# Patient Record
Sex: FEMALE | Race: WHITE | NOT HISPANIC OR LATINO | Employment: OTHER | URBAN - METROPOLITAN AREA
[De-identification: names, ages, dates, MRNs, and addresses within clinical notes are randomized per-mention and may not be internally consistent; named-entity substitution may affect disease eponyms.]

---

## 2024-10-26 ENCOUNTER — HOSPITAL ENCOUNTER (EMERGENCY)
Facility: HOSPITAL | Age: 49
Discharge: HOME | End: 2024-10-26
Attending: EMERGENCY MEDICINE
Payer: MEDICARE

## 2024-10-26 VITALS
DIASTOLIC BLOOD PRESSURE: 84 MMHG | HEART RATE: 96 BPM | WEIGHT: 170.42 LBS | SYSTOLIC BLOOD PRESSURE: 126 MMHG | OXYGEN SATURATION: 96 % | BODY MASS INDEX: 32.17 KG/M2 | HEIGHT: 61 IN | TEMPERATURE: 98.3 F | RESPIRATION RATE: 18 BRPM

## 2024-10-26 DIAGNOSIS — M54.10 RADICULOPATHY, UNSPECIFIED SPINAL REGION: ICD-10-CM

## 2024-10-26 DIAGNOSIS — M54.12 CERVICAL RADICULOPATHY: Primary | ICD-10-CM

## 2024-10-26 PROCEDURE — 63710000001 PREDNISONE PER 5 MG: Performed by: EMERGENCY MEDICINE

## 2024-10-26 PROCEDURE — 99283 EMERGENCY DEPT VISIT LOW MDM: CPT

## 2024-10-26 RX ORDER — HYDROCODONE BITARTRATE AND ACETAMINOPHEN 7.5; 325 MG/1; MG/1
1 TABLET ORAL ONCE
Status: COMPLETED | OUTPATIENT
Start: 2024-10-26 | End: 2024-10-26

## 2024-10-26 RX ORDER — PREDNISONE 50 MG/1
50 TABLET ORAL ONCE
Status: COMPLETED | OUTPATIENT
Start: 2024-10-26 | End: 2024-10-26

## 2024-10-26 RX ORDER — HYDROXYZINE HYDROCHLORIDE 50 MG/1
50 TABLET, FILM COATED ORAL 2 TIMES DAILY PRN
COMMUNITY
Start: 2024-10-11

## 2024-10-26 RX ORDER — BUSPIRONE HYDROCHLORIDE 10 MG/1
1 TABLET ORAL EVERY 12 HOURS SCHEDULED
COMMUNITY
Start: 2024-09-12

## 2024-10-26 RX ORDER — ALPRAZOLAM 2 MG
TABLET ORAL
COMMUNITY
Start: 2024-10-11

## 2024-10-26 RX ORDER — HYDROCODONE BITARTRATE AND ACETAMINOPHEN 7.5; 325 MG/1; MG/1
1 TABLET ORAL EVERY 6 HOURS
COMMUNITY
Start: 2024-10-08 | End: 2024-10-26

## 2024-10-26 RX ORDER — PREDNISONE 10 MG/1
TABLET ORAL SEE ADMIN INSTRUCTIONS
COMMUNITY
Start: 2024-08-05 | End: 2024-10-26

## 2024-10-26 RX ORDER — HYDROCODONE BITARTRATE AND ACETAMINOPHEN 7.5; 325 MG/1; MG/1
1 TABLET ORAL EVERY 6 HOURS
Qty: 6 TABLET | Refills: 0 | Status: SHIPPED | OUTPATIENT
Start: 2024-10-26

## 2024-10-26 RX ORDER — LEVOTHYROXINE SODIUM 100 UG/1
TABLET ORAL
COMMUNITY
Start: 2024-08-30

## 2024-10-26 RX ORDER — CLONIDINE HYDROCHLORIDE 0.1 MG/1
1 TABLET ORAL EVERY 12 HOURS SCHEDULED
COMMUNITY
Start: 2024-10-11

## 2024-10-26 RX ORDER — CYCLOBENZAPRINE HCL 5 MG
1 TABLET ORAL 3 TIMES DAILY
COMMUNITY
Start: 2024-09-01

## 2024-10-26 RX ORDER — GABAPENTIN 600 MG/1
600 TABLET ORAL 2 TIMES DAILY
COMMUNITY

## 2024-10-26 RX ORDER — PREDNISONE 50 MG/1
50 TABLET ORAL DAILY
Qty: 5 TABLET | Refills: 0 | Status: SHIPPED | OUTPATIENT
Start: 2024-10-26

## 2024-10-26 RX ADMIN — HYDROCODONE BITARTRATE AND ACETAMINOPHEN 1 TABLET: 7.5; 325 TABLET ORAL at 10:55

## 2024-10-26 RX ADMIN — PREDNISONE 50 MG: 50 TABLET ORAL at 10:55

## 2024-10-26 NOTE — DISCHARGE INSTRUCTIONS
Avoid strenuous activities.  Apply warm and/or cool compresses to your neck area.  Apply for 20 to 30 minutes 3-4 times per day.  Take the prescriptions as prescribed.  Continue with your home medications as prescribed.  Call your spine specialist Monday and schedule follow-up appointment as soon as possible.  Follow with your primary care provider this week.  Return to the ER for any new symptoms or any other concerns that may arise.

## 2024-10-26 NOTE — ED PROVIDER NOTES
Time: 10:42 AM EDT  Date of encounter:  10/26/2024  Independent Historian/Clinical History and Information was obtained by:   Patient  Chief Complaint: Neck pain    History is limited by: N/A    History of Present Illness:  Patient is a 49 y.o. year old female who presents to the emergency department for evaluation of treatment of neck pain.  Patient reports that she has a known herniated disc in her neck.  Patient presents complaining of worsening pain along the left side of her neck that goes into her left shoulder and down her left arm.  Patient reports it is worse with movement.  Patient reports that she has been seen by a spine surgeon in [place].  Patient also states that she has thyroid disease and she is concerned that her thyroid may be enlarged.  Patient is already on opioid pain medication.  Patient reports this pain she is having today she has had before chronically but is just more intense today.    HPI    Patient Care Team  Primary Care Provider: No primary care provider on file.    Past Medical History:     No Known Allergies  Past Medical History:   Diagnosis Date    Anxiety     Cervix cancer     Hyperlipidemia     Kidney stone     Thyroid condition      Past Surgical History:   Procedure Laterality Date    KIDNEY STONE SURGERY      LEG SURGERY      vein repair     History reviewed. No pertinent family history.    Home Medications:  Prior to Admission medications    Medication Sig Start Date End Date Taking? Authorizing Provider   ALPRAZolam (XANAX) 2 MG tablet TAKE ONE TABLET THREE TIMES DAILY AS NEEDED 10/11/24  Yes Julissa Reed MD   busPIRone (BUSPAR) 10 MG tablet Take 1 tablet by mouth Every 12 (Twelve) Hours. 9/12/24  Yes Julissa Reed MD   cloNIDine (CATAPRES) 0.1 MG tablet Take 1 tablet by mouth Every 12 (Twelve) Hours. 10/11/24  Yes Julissa Reed MD   cyclobenzaprine (FLEXERIL) 5 MG tablet Take 1 tablet by mouth 3 times a day. 9/1/24  Yes Julissa Reed MD  "  FLUoxetine (PROzac) 20 MG capsule Take 1 capsule by mouth Daily. 10/11/24  Yes Julissa Reed MD   HYDROcodone-acetaminophen (NORCO) 7.5-325 MG per tablet Take 1 tablet by mouth Every 6 (Six) Hours. 10/8/24  Yes Julissa Reed MD   hydrOXYzine (ATARAX) 50 MG tablet Take 1 tablet by mouth 2 (Two) Times a Day As Needed. 10/11/24  Yes Julissa Reed MD   levothyroxine (SYNTHROID, LEVOTHROID) 100 MCG tablet  8/30/24  Yes Julissa Reed MD   predniSONE (DELTASONE) 10 MG (21) dose pack See Admin Instructions. follow package directions 8/5/24  Yes Julissa Reed MD   gabapentin (NEURONTIN) 600 MG tablet Take 1 tablet by mouth 2 (Two) Times a Day.    Julissa Reed MD        Social History:   Social History     Tobacco Use    Smoking status: Every Day     Current packs/day: 1.00     Types: Cigarettes   Substance Use Topics    Alcohol use: Not Currently    Drug use: Never         Review of Systems:  Review of Systems   Musculoskeletal:  Positive for neck pain.        Physical Exam:  /82   Pulse 98   Temp 98.3 °F (36.8 °C)   Resp 20   Ht 154.9 cm (61\")   Wt 77.3 kg (170 lb 6.7 oz)   SpO2 95%   BMI 32.20 kg/m²     Physical Exam    Vital signs were reviewed under triage note.  General appearance - Patient appears well-developed and well-nourished.  Patient is in no acute distress.  Head - Normocephalic, atraumatic.  Pupils - Equal, round, reactive to light.  Extraocular muscles are intact.  Conjunctiva is clear.  Nasal - Normal inspection.  No evidence of trauma or epistaxis.  Tympanic membranes - Gray, intact without erythema or retractions.  Oral mucosa - Pink and moist without lesions or erythema.  Uvula is midline.  Chest wall - Atraumatic.  Chest wall is nontender.  There are no vesicular rashes noted.  Neck - Supple.  Trachea was midline.  There is no palpable lymphadenopathy or thyromegaly.  There are no meningeal signs  Lungs - Clear to auscultation and percussion " bilaterally.  Heart - Regular rate and rhythm without any murmurs, clicks, or gallops.  Abdomen - Soft.  Bowel sounds are present.  There is no palpable tenderness.  There is no rebound, guarding, or rigidity.  There are no palpable masses.  There are no pulsatile masses.  Back - Spine is straight and midline.  There is no CVA tenderness.  Extremities - Intact x4 with full range of motion.  There is no palpable edema.  Pulses are intact x4 and equal.  Neurologic - Patient is awake, alert, and oriented x3.  Cranial nerves II through XII are grossly intact.  Motor and sensory functions grossly intact.  Cerebellar function was normal.  DTRs to the brachioradialis, biceps, and triceps were +2/4 and equal bilaterally.  Integument - There are no rashes.  There are no petechia or purpura lesions noted.  There are no vesicular lesions noted.          Procedures:  Procedures      Medical Decision Making:      Comorbidities that affect care:    Thyroid disease, degenerative disc disease, anxiety, hyperlipidemia, kidney stones, cervical cancer    External Notes reviewed:    Previous Clinic Note: Office visit with Ms. Eric NP on 10/24/2024 was reviewed by me.      The following orders were placed and all results were independently analyzed by me:  No orders of the defined types were placed in this encounter.      Medications Given in the Emergency Department:  Medications   predniSONE (DELTASONE) tablet 50 mg (has no administration in time range)   HYDROcodone-acetaminophen (NORCO) 7.5-325 MG per tablet 1 tablet (has no administration in time range)        ED Course:     The patient was seen and evaluated in the ED by me.  The above history and physical examination was performed as documented.  There is no visible soft tissue swelling with the exception of possibly the dorsum of the left hand.  Patient has good distal pulses.  Patient has full range of motion of her left upper extremity.  Patient's NIH score is 0.  There is no  palpable masses in the patient's neck and she has full range of motion of her cervical spine.  Based on the patient's history she likely has cervical radiculopathy.  Patient be given a course of steroids.  Patient is out of her hydrocodone which she ran out of 2 days ago but did not get a refill from her PCP.  I will give her 6 tablets to get her through the weekend but I explained to her that she is on these chronically and that I do not refill chronic narcotics in the ED that these need to come from her PCP.  I also explained to her that if she feels like her cervical radiculopathy symptoms are worsening she needs to follow-up with her spine specialist to discuss treatment options.  Patient reports to me that it has been recommended to her that she needs surgery.  I cannot verify that but she will need to discuss treatment options with her subspecialist.  There is no acute indication for any additional workup in the ED at this time.    Labs:    Lab Results (last 24 hours)       ** No results found for the last 24 hours. **             Imaging:    No Radiology Exams Resulted Within Past 24 Hours      Differential Diagnosis and Discussion:    Extremity Pain: Differential diagnosis includes but is not limited to soft tissue sprain, tendonitis, tendon injury, dislocation, fracture, deep vein thrombosis, arterial insufficiency, osteoarthritis, bursitis, and ligamentous damage.  Neck Pain: The patient presents with neck pain. My differential diagnosis includes but is not limited to acute spinal epidural abscess, acute spinal epidural bleed, meningitis, musculoskeletal neck pain, spinal fracture, and osteoarthritis.         MDM           Patient Care Considerations:    A CT scan of the neck would not alter the ED treatment plan or course.  Patient reports a history of spinal stenosis and cervical radiculopathy.  An MRI is not available in the ED setting.      Consultants/Shared Management Plan:    None    Social  Determinants of Health:    Patient is independent, reliable, and has access to care.       Disposition and Care Coordination:    Discharged: I considered escalation of care by admitting this patient to the hospital, however there were no acute findings to warrant inpatient admission.    I have explained the patient´s condition, diagnoses and treatment plan based on the information available to me at this time. I have answered questions and addressed any concerns. The patient has a good  understanding of the patient´s diagnosis, condition, and treatment plan as can be expected at this point. The vital signs have been stable. The patient´s condition is stable and appropriate for discharge from the emergency department.      The patient will pursue further outpatient evaluation with the primary care physician or other designated or consulting physician as outlined in the discharge instructions. They are agreeable to this plan of care and follow-up instructions have been explained in detail. The patient has received these instructions in written format and has expressed an understanding of the discharge instructions. The patient is aware that any significant change in condition or worsening of symptoms should prompt an immediate return to this or the closest emergency department or call to 911.  I have explained discharge medications and the need for follow up with the patient/caretakers. This was also printed in the discharge instructions. Patient was discharged with the following medications and follow up:      Medication List        New Prescriptions      predniSONE 50 MG tablet  Commonly known as: DELTASONE  Take 1 tablet by mouth Daily.  Replaces: predniSONE 10 MG (21) dose pack            Stop      predniSONE 10 MG (21) dose pack  Commonly known as: DELTASONE  Replaced by: predniSONE 50 MG tablet               Where to Get Your Medications        These medications were sent to [facility]/pharmacy #[id number]  [place]  [place] - [address] - [phone]  - [phone] FX  [address] [place] [address]      Hours: 24-hours Phone: [phone]   HYDROcodone-acetaminophen 7.5-325 MG per tablet  predniSONE 50 MG tablet        Follow-up with your primary care provider to discuss further pain management.  Follow-up with your spine specialist to discuss further treatment of your neck pain and arm pain.          Final diagnoses:   Radiculopathy, unspecified spinal region   Cervical radiculopathy        ED Disposition       ED Disposition   Discharge    Condition   Stable    Comment   --               This medical record created using voice recognition software.             Devin Hernandez DO  10/28/24 [id number]

## 2024-12-11 ENCOUNTER — HOSPITAL ENCOUNTER (EMERGENCY)
Facility: HOSPITAL | Age: 49
Discharge: HOME | End: 2024-12-11
Attending: EMERGENCY MEDICINE | Admitting: EMERGENCY MEDICINE
Payer: COMMERCIAL

## 2024-12-11 ENCOUNTER — APPOINTMENT (OUTPATIENT)
Dept: CT IMAGING | Facility: HOSPITAL | Age: 49
End: 2024-12-11
Payer: MEDICARE

## 2024-12-11 VITALS
TEMPERATURE: 98.3 F | SYSTOLIC BLOOD PRESSURE: 107 MMHG | RESPIRATION RATE: 20 BRPM | HEIGHT: 61 IN | DIASTOLIC BLOOD PRESSURE: 62 MMHG | WEIGHT: 181.88 LBS | BODY MASS INDEX: 34.34 KG/M2 | HEART RATE: 88 BPM | OXYGEN SATURATION: 93 %

## 2024-12-11 DIAGNOSIS — K42.9 UMBILICAL HERNIA WITHOUT OBSTRUCTION AND WITHOUT GANGRENE: Primary | ICD-10-CM

## 2024-12-11 LAB
ALBUMIN SERPL-MCNC: 4.1 G/DL (ref 3.5–5.2)
ALBUMIN/GLOB SERPL: 1.2 G/DL
ALP SERPL-CCNC: 101 U/L (ref 39–117)
ALT SERPL W P-5'-P-CCNC: 18 U/L (ref 1–33)
ANION GAP SERPL CALCULATED.3IONS-SCNC: 8.8 MMOL/L (ref 5–15)
AST SERPL-CCNC: 20 U/L (ref 1–32)
BASOPHILS # BLD AUTO: 0.1 10*3/MM3 (ref 0–0.2)
BASOPHILS NFR BLD AUTO: 1.2 % (ref 0–1.5)
BILIRUB SERPL-MCNC: 0.2 MG/DL (ref 0–1.2)
BILIRUB UR QL STRIP: NEGATIVE
BUN SERPL-MCNC: 14 MG/DL (ref 6–20)
BUN/CREAT SERPL: 13.5 (ref 7–25)
CALCIUM SPEC-SCNC: 9.2 MG/DL (ref 8.6–10.5)
CHLORIDE SERPL-SCNC: 106 MMOL/L (ref 98–107)
CLARITY UR: CLEAR
CO2 SERPL-SCNC: 25.2 MMOL/L (ref 22–29)
COLOR UR: YELLOW
CREAT SERPL-MCNC: 1.04 MG/DL (ref 0.57–1)
DEPRECATED RDW RBC AUTO: 40.9 FL (ref 37–54)
EGFRCR SERPLBLD CKD-EPI 2021: 66.8 ML/MIN/1.73
EOSINOPHIL # BLD AUTO: 0.33 10*3/MM3 (ref 0–0.4)
EOSINOPHIL NFR BLD AUTO: 3.9 % (ref 0.3–6.2)
ERYTHROCYTE [DISTWIDTH] IN BLOOD BY AUTOMATED COUNT: 12.7 % (ref 12.3–15.4)
GLOBULIN UR ELPH-MCNC: 3.5 GM/DL
GLUCOSE SERPL-MCNC: 88 MG/DL (ref 65–99)
GLUCOSE UR STRIP-MCNC: NEGATIVE MG/DL
HCT VFR BLD AUTO: 41.3 % (ref 34–46.6)
HGB BLD-MCNC: 13.8 G/DL (ref 12–15.9)
HGB UR QL STRIP.AUTO: NEGATIVE
HOLD SPECIMEN: NORMAL
IMM GRANULOCYTES # BLD AUTO: 0.01 10*3/MM3 (ref 0–0.05)
IMM GRANULOCYTES NFR BLD AUTO: 0.1 % (ref 0–0.5)
KETONES UR QL STRIP: NEGATIVE
LEUKOCYTE ESTERASE UR QL STRIP.AUTO: NEGATIVE
LIPASE SERPL-CCNC: 37 U/L (ref 13–60)
LYMPHOCYTES # BLD AUTO: 2.97 10*3/MM3 (ref 0.7–3.1)
LYMPHOCYTES NFR BLD AUTO: 35.3 % (ref 19.6–45.3)
MCH RBC QN AUTO: 29.5 PG (ref 26.6–33)
MCHC RBC AUTO-ENTMCNC: 33.4 G/DL (ref 31.5–35.7)
MCV RBC AUTO: 88.2 FL (ref 79–97)
MONOCYTES # BLD AUTO: 0.67 10*3/MM3 (ref 0.1–0.9)
MONOCYTES NFR BLD AUTO: 8 % (ref 5–12)
NEUTROPHILS NFR BLD AUTO: 4.34 10*3/MM3 (ref 1.7–7)
NEUTROPHILS NFR BLD AUTO: 51.5 % (ref 42.7–76)
NITRITE UR QL STRIP: NEGATIVE
NRBC BLD AUTO-RTO: 0 /100 WBC (ref 0–0.2)
PH UR STRIP.AUTO: 7 [PH] (ref 5–8)
PLATELET # BLD AUTO: 262 10*3/MM3 (ref 140–450)
PMV BLD AUTO: 9.3 FL (ref 6–12)
POTASSIUM SERPL-SCNC: 4 MMOL/L (ref 3.5–5.2)
PROT SERPL-MCNC: 7.6 G/DL (ref 6–8.5)
PROT UR QL STRIP: NEGATIVE
QT INTERVAL: 433 MS
QTC INTERVAL: 505 MS
RBC # BLD AUTO: 4.68 10*6/MM3 (ref 3.77–5.28)
SODIUM SERPL-SCNC: 140 MMOL/L (ref 136–145)
SP GR UR STRIP: >1.03 (ref 1–1.03)
UROBILINOGEN UR QL STRIP: ABNORMAL
WBC NRBC COR # BLD AUTO: 8.42 10*3/MM3 (ref 3.4–10.8)
WHOLE BLOOD HOLD COAG: NORMAL
WHOLE BLOOD HOLD SPECIMEN: NORMAL

## 2024-12-11 PROCEDURE — 83690 ASSAY OF LIPASE: CPT | Performed by: EMERGENCY MEDICINE

## 2024-12-11 PROCEDURE — 25010000002 ONDANSETRON PER 1 MG: Performed by: EMERGENCY MEDICINE

## 2024-12-11 PROCEDURE — 80053 COMPREHEN METABOLIC PANEL: CPT | Performed by: EMERGENCY MEDICINE

## 2024-12-11 PROCEDURE — 93005 ELECTROCARDIOGRAM TRACING: CPT | Performed by: EMERGENCY MEDICINE

## 2024-12-11 PROCEDURE — 74177 CT ABD & PELVIS W/CONTRAST: CPT

## 2024-12-11 PROCEDURE — 96375 TX/PRO/DX INJ NEW DRUG ADDON: CPT

## 2024-12-11 PROCEDURE — 81003 URINALYSIS AUTO W/O SCOPE: CPT | Performed by: EMERGENCY MEDICINE

## 2024-12-11 PROCEDURE — 85025 COMPLETE CBC W/AUTO DIFF WBC: CPT | Performed by: EMERGENCY MEDICINE

## 2024-12-11 PROCEDURE — 25010000002 KETOROLAC TROMETHAMINE PER 15 MG: Performed by: EMERGENCY MEDICINE

## 2024-12-11 PROCEDURE — 99285 EMERGENCY DEPT VISIT HI MDM: CPT

## 2024-12-11 PROCEDURE — 96374 THER/PROPH/DIAG INJ IV PUSH: CPT

## 2024-12-11 PROCEDURE — 71275 CT ANGIOGRAPHY CHEST: CPT

## 2024-12-11 PROCEDURE — 25510000001 IOPAMIDOL PER 1 ML: Performed by: EMERGENCY MEDICINE

## 2024-12-11 PROCEDURE — 25010000002 HYDROMORPHONE PER 4 MG: Performed by: EMERGENCY MEDICINE

## 2024-12-11 RX ORDER — IOPAMIDOL 755 MG/ML
100 INJECTION, SOLUTION INTRAVASCULAR
Status: COMPLETED | OUTPATIENT
Start: 2024-12-11 | End: 2024-12-11

## 2024-12-11 RX ORDER — CEPHALEXIN 500 MG/1
500 CAPSULE ORAL 3 TIMES DAILY
Qty: 21 CAPSULE | Refills: 0 | Status: SHIPPED | OUTPATIENT
Start: 2024-12-11

## 2024-12-11 RX ORDER — SODIUM CHLORIDE 0.9 % (FLUSH) 0.9 %
10 SYRINGE (ML) INJECTION AS NEEDED
Status: DISCONTINUED | OUTPATIENT
Start: 2024-12-11 | End: 2024-12-11 | Stop reason: HOSPADM

## 2024-12-11 RX ORDER — ONDANSETRON 2 MG/ML
4 INJECTION INTRAMUSCULAR; INTRAVENOUS ONCE
Status: COMPLETED | OUTPATIENT
Start: 2024-12-11 | End: 2024-12-11

## 2024-12-11 RX ORDER — OXYCODONE AND ACETAMINOPHEN 5; 325 MG/1; MG/1
1 TABLET ORAL EVERY 6 HOURS PRN
Qty: 12 TABLET | Refills: 0 | Status: SHIPPED | OUTPATIENT
Start: 2024-12-11 | End: 2024-12-16

## 2024-12-11 RX ORDER — HYDROMORPHONE HYDROCHLORIDE 1 MG/ML
1 INJECTION, SOLUTION INTRAMUSCULAR; INTRAVENOUS; SUBCUTANEOUS ONCE
Status: COMPLETED | OUTPATIENT
Start: 2024-12-11 | End: 2024-12-11

## 2024-12-11 RX ORDER — KETOROLAC TROMETHAMINE 30 MG/ML
30 INJECTION, SOLUTION INTRAMUSCULAR; INTRAVENOUS ONCE
Status: COMPLETED | OUTPATIENT
Start: 2024-12-11 | End: 2024-12-11

## 2024-12-11 RX ORDER — OXYCODONE AND ACETAMINOPHEN 5; 325 MG/1; MG/1
1 TABLET ORAL ONCE
Status: COMPLETED | OUTPATIENT
Start: 2024-12-11 | End: 2024-12-11

## 2024-12-11 RX ADMIN — ONDANSETRON 4 MG: 2 INJECTION INTRAMUSCULAR; INTRAVENOUS at 17:28

## 2024-12-11 RX ADMIN — OXYCODONE AND ACETAMINOPHEN 1 TABLET: 5; 325 TABLET ORAL at 19:03

## 2024-12-11 RX ADMIN — KETOROLAC TROMETHAMINE 30 MG: 30 INJECTION, SOLUTION INTRAMUSCULAR; INTRAVENOUS at 19:03

## 2024-12-11 RX ADMIN — IOPAMIDOL 95 ML: 755 INJECTION, SOLUTION INTRAVENOUS at 17:49

## 2024-12-11 RX ADMIN — HYDROMORPHONE HYDROCHLORIDE 1 MG: 1 INJECTION, SOLUTION INTRAMUSCULAR; INTRAVENOUS; SUBCUTANEOUS at 17:28

## 2024-12-11 NOTE — ED PROVIDER NOTES
Time: 6:40 PM EST  Date of encounter:  12/11/2024  Independent Historian/Clinical History and Information was obtained by:   Patient    History is limited by: N/A    Chief Complaint: Abdominal pain      History of Present Illness:  Patient is a 49 y.o. year old female who presents to the emergency department for evaluation of abdominal pain and tender mass started yesterday.  Patient denies chest pain shortness of breath.  Patient has no cough or hemoptysis.  Patient has no nausea or vomiting.  Patient denies diarrhea.      Patient Care Team  Primary Care Provider: Jignesh Doe    Past Medical History:     No Known Allergies  Past Medical History:   Diagnosis Date    Anxiety     Cervix cancer     Hyperlipidemia     Kidney stone     Thyroid condition      Past Surgical History:   Procedure Laterality Date    KIDNEY STONE SURGERY      LEG SURGERY      vein repair     History reviewed. No pertinent family history.    Home Medications:  Prior to Admission medications    Medication Sig Start Date End Date Taking? Authorizing Provider   ALPRAZolam (XANAX) 2 MG tablet TAKE ONE TABLET THREE TIMES DAILY AS NEEDED 10/11/24   Julissa Reed MD   busPIRone (BUSPAR) 10 MG tablet Take 1 tablet by mouth Every 12 (Twelve) Hours. 9/12/24   Julissa Reed MD   cloNIDine (CATAPRES) 0.1 MG tablet Take 1 tablet by mouth Every 12 (Twelve) Hours. 10/11/24   Julissa Reed MD   cyclobenzaprine (FLEXERIL) 5 MG tablet Take 1 tablet by mouth 3 times a day. 9/1/24   Julissa Reed MD   FLUoxetine (PROzac) 20 MG capsule Take 1 capsule by mouth Daily. 10/11/24   Julissa Reed MD   gabapentin (NEURONTIN) 600 MG tablet Take 1 tablet by mouth 2 (Two) Times a Day.    Julissa Reed MD   HYDROcodone-acetaminophen (NORCO) 7.5-325 MG per tablet Take 1 tablet by mouth Every 6 (Six) Hours. 10/26/24   Devin Hernandez DO   hydrOXYzine (ATARAX) 50 MG tablet Take 1 tablet by mouth 2 (Two) Times a Day As Needed.  "10/11/24   ProviderJulissa MD   levothyroxine (SYNTHROID, LEVOTHROID) 100 MCG tablet  8/30/24   Julissa Reed MD   predniSONE (DELTASONE) 50 MG tablet Take 1 tablet by mouth Daily. 10/26/24   Devin Hernandez DO        Social History:   Social History     Tobacco Use    Smoking status: Every Day     Current packs/day: 1.00     Types: Cigarettes   Substance Use Topics    Alcohol use: Not Currently    Drug use: Never         Review of Systems:  Review of Systems   Constitutional:  Negative for chills and fever.   HENT:  Negative for congestion, rhinorrhea and sore throat.    Eyes:  Negative for pain and visual disturbance.   Respiratory:  Negative for apnea, cough, chest tightness and shortness of breath.    Cardiovascular:  Negative for chest pain and palpitations.   Gastrointestinal:  Positive for abdominal pain. Negative for diarrhea, nausea and vomiting.   Genitourinary:  Negative for difficulty urinating and dysuria.   Musculoskeletal:  Negative for joint swelling and myalgias.   Skin:  Negative for color change.   Neurological:  Negative for seizures and headaches.   Psychiatric/Behavioral: Negative.     All other systems reviewed and are negative.       Physical Exam:  /62   Pulse 88   Temp 98.3 °F (36.8 °C) (Oral)   Resp 20   Ht 154.9 cm (61\")   Wt 82.5 kg (181 lb 14.1 oz)   SpO2 93%   BMI 34.37 kg/m²     Physical Exam  Vitals and nursing note reviewed.   Constitutional:       General: She is not in acute distress.     Appearance: Normal appearance. She is not toxic-appearing.   HENT:      Head: Normocephalic and atraumatic.      Jaw: There is normal jaw occlusion.   Eyes:      General: Lids are normal.      Extraocular Movements: Extraocular movements intact.      Conjunctiva/sclera: Conjunctivae normal.      Pupils: Pupils are equal, round, and reactive to light.   Cardiovascular:      Rate and Rhythm: Normal rate and regular rhythm.      Pulses: Normal pulses.      Heart sounds: Normal " heart sounds.   Pulmonary:      Effort: Pulmonary effort is normal. No respiratory distress.      Breath sounds: Normal breath sounds. No wheezing or rhonchi.   Abdominal:      General: Abdomen is flat.      Palpations: Abdomen is soft.      Tenderness: There is no abdominal tenderness. There is no guarding or rebound.   Musculoskeletal:         General: Normal range of motion.      Cervical back: Normal range of motion and neck supple.      Right lower leg: No edema.      Left lower leg: No edema.   Skin:     General: Skin is warm and dry.   Neurological:      Mental Status: She is alert and oriented to person, place, and time. Mental status is at baseline.   Psychiatric:         Mood and Affect: Mood normal.                    Medical Decision Making:      Comorbidities that affect care:    Kidney stone    External Notes reviewed:    Previous ED Note: Patient was last seen in the ED for neck pain.      The following orders were placed and all results were independently analyzed by me:  Orders Placed This Encounter   Procedures    CT Abdomen Pelvis With Contrast    CT Angiogram Chest Pulmonary Embolism    [place] Draw    Comprehensive Metabolic Panel    Lipase    Urinalysis With Microscopic If Indicated (No Culture) - Urine, Clean Catch    CBC Auto Differential    NPO Diet NPO Type: Strict NPO    Undress & Gown    ECG 12 Lead Chest Pain    Insert Peripheral IV    CBC & Differential    Green Top (Gel)    Lavender Top    Gold Top - SST    Light Blue Top    Extra Tubes    Gray Top       Medications Given in the Emergency Department:  Medications   sodium chloride 0.9 % flush 10 mL (has no administration in time range)   HYDROmorphone PF (DILAUDID) injection 1 mg (1 mg Intravenous Given 12/11/24 1728)   ondansetron (ZOFRAN) injection 4 mg (4 mg Intravenous Given 12/11/24 1728)   iopamidol (ISOVUE-370) 76 % injection 100 mL (95 mL Intravenous Given 12/11/24 1749)   ketorolac (TORADOL) injection 30 mg (30 mg Intravenous  Given 12/11/24 1903)   oxyCODONE-acetaminophen (PERCOCET) 5-325 MG per tablet 1 tablet (1 tablet Oral Given 12/11/24 1903)        ED Course:         Labs:    Lab Results (last 24 hours)       Procedure Component Value Units Date/Time    CBC & Differential [[id number]]  (Normal) Collected: 12/11/24 1725    Specimen: Blood Updated: 12/11/24 1732    Narrative:      The following orders were created for panel order CBC & Differential.  Procedure                               Abnormality         Status                     ---------                               -----------         ------                     CBC Auto Differential[[id number]]        Normal              Final result                 Please view results for these tests on the individual orders.    Comprehensive Metabolic Panel [[id number]]  (Abnormal) Collected: 12/11/24 1725    Specimen: Blood Updated: 12/11/24 1749     Glucose 88 mg/dL      BUN 14 mg/dL      Creatinine 1.04 mg/dL      Sodium 140 mmol/L      Potassium 4.0 mmol/L      Chloride 106 mmol/L      CO2 25.2 mmol/L      Calcium 9.2 mg/dL      Total Protein 7.6 g/dL      Albumin 4.1 g/dL      ALT (SGPT) 18 U/L      AST (SGOT) 20 U/L      Alkaline Phosphatase 101 U/L      Total Bilirubin 0.2 mg/dL      Globulin 3.5 gm/dL      A/G Ratio 1.2 g/dL      BUN/Creatinine Ratio 13.5     Anion Gap 8.8 mmol/L      eGFR 66.8 mL/min/1.73     Narrative:      GFR Categories in Chronic Kidney Disease (CKD)      GFR Category          GFR (mL/min/1.73)    Interpretation  G1                     90 or greater         Normal or high (1)  G2                      60-89                Mild decrease (1)  G3a                   45-59                Mild to moderate decrease  G3b                   30-44                Moderate to severe decrease  G4                    15-29                Severe decrease  G5                    14 or less           Kidney failure          (1)In the absence of evidence of kidney disease, neither GFR  category G1 or G2 fulfill the criteria for CKD.    eGFR calculation 2021 CKD-EPI creatinine equation, which does not include race as a factor    Lipase [[id number]]  (Normal) Collected: 12/11/24 1725    Specimen: Blood Updated: 12/11/24 1749     Lipase 37 U/L     CBC Auto Differential [[id number]]  (Normal) Collected: 12/11/24 1725    Specimen: Blood Updated: 12/11/24 1732     WBC 8.42 10*3/mm3      RBC 4.68 10*6/mm3      Hemoglobin 13.8 g/dL      Hematocrit 41.3 %      MCV 88.2 fL      MCH 29.5 pg      MCHC 33.4 g/dL      RDW 12.7 %      RDW-SD 40.9 fl      MPV 9.3 fL      Platelets 262 10*3/mm3      Neutrophil % 51.5 %      Lymphocyte % 35.3 %      Monocyte % 8.0 %      Eosinophil % 3.9 %      Basophil % 1.2 %      Immature Grans % 0.1 %      Neutrophils, Absolute 4.34 10*3/mm3      Lymphocytes, Absolute 2.97 10*3/mm3      Monocytes, Absolute 0.67 10*3/mm3      Eosinophils, Absolute 0.33 10*3/mm3      Basophils, Absolute 0.10 10*3/mm3      Immature Grans, Absolute 0.01 10*3/mm3      nRBC 0.0 /100 WBC     Urinalysis With Microscopic If Indicated (No Culture) - Urine, Clean Catch [[id number]]  (Abnormal) Collected: 12/11/24 1906    Specimen: Urine, Clean Catch Updated: 12/11/24 1915     Color, UA Yellow     Appearance, UA Clear     pH, UA 7.0     Specific Gravity, UA >1.030     Glucose, UA Negative     Ketones, UA Negative     Bilirubin, UA Negative     Blood, UA Negative     Protein, UA Negative     Leuk Esterase, UA Negative     Nitrite, UA Negative     Urobilinogen, UA 1.0 E.U./dL    Narrative:      Urine microscopic not indicated.             Imaging:    CT Abdomen Pelvis With Contrast    Result Date: 12/11/2024  CT ABDOMEN PELVIS W CONTRAST, CT ANGIOGRAM CHEST PULMONARY EMBOLISM Date of Exam: 12/11/2024 5:41 PM EST Indication: Abdominal pain abdominal pain. Comparison: None available. Technique: Axial CT images were obtained of the abdomen and pelvis after the uneventful intravenous administration of iodinated  contrast. Reconstructed coronal and sagittal images were also obtained. Automated exposure control and iterative construction methods were used. FINDINGS: Lung bases: No masses. No consolidation. Liver:No masses. No intrahepatic biliary ductal dilatation. Spleen:No masses. No perisplenic hematoma. Pancreas:No pancreatic masses. No evidence of pancreatitis. Gallbladder and common bile duct:No evidence of cholelithiasis. No evidence of cholecystitis. Adrenal glands:No adrenal masses Kidneys and ureters: 0.6 cm right renal cyst. No calculi present within the ureters. Normal caliber ureters. Urinary bladder:No urinary bladder wall thickening. No bladder masses. Small bowel:Normal caliber small bowel. Large bowel:No diverticulosis or diverticulitis. No large bowel masses are appreciated Appendix: Normal GENITOURINARY: Normal appearance of the uterus and adnexa. Ascites or pneumoperitoneum:None. Adenopathy:None present Osseous structures: The proximal femurs are intact. The pubic bones are intact. The sacrum and sacroiliac joints are normal. Degenerative changes of the lumbar spine. No rib fractures. Other findings: Fat-containing umbilical hernia.     1.No acute findings in the abdomen or pelvis. 2.No evidence of pulmonary embolism. 3.Fat-containing umbilical hernia. Electronically Signed: Sarwat Robles MD  12/11/2024 6:02 PM EST  Workstation ID: [id number]    CT Angiogram Chest Pulmonary Embolism    Result Date: 12/11/2024  CT ABDOMEN PELVIS W CONTRAST, CT ANGIOGRAM CHEST PULMONARY EMBOLISM Date of Exam: 12/11/2024 5:41 PM EST Indication: Abdominal pain abdominal pain. Comparison: None available. Technique: Axial CT images were obtained of the abdomen and pelvis after the uneventful intravenous administration of iodinated contrast. Reconstructed coronal and sagittal images were also obtained. Automated exposure control and iterative construction methods were used. FINDINGS: Lung bases: No masses. No consolidation. Liver:No  masses. No intrahepatic biliary ductal dilatation. Spleen:No masses. No perisplenic hematoma. Pancreas:No pancreatic masses. No evidence of pancreatitis. Gallbladder and common bile duct:No evidence of cholelithiasis. No evidence of cholecystitis. Adrenal glands:No adrenal masses Kidneys and ureters: 0.6 cm right renal cyst. No calculi present within the ureters. Normal caliber ureters. Urinary bladder:No urinary bladder wall thickening. No bladder masses. Small bowel:Normal caliber small bowel. Large bowel:No diverticulosis or diverticulitis. No large bowel masses are appreciated Appendix: Normal GENITOURINARY: Normal appearance of the uterus and adnexa. Ascites or pneumoperitoneum:None. Adenopathy:None present Osseous structures: The proximal femurs are intact. The pubic bones are intact. The sacrum and sacroiliac joints are normal. Degenerative changes of the lumbar spine. No rib fractures. Other findings: Fat-containing umbilical hernia.     1.No acute findings in the abdomen or pelvis. 2.No evidence of pulmonary embolism. 3.Fat-containing umbilical hernia. Electronically Signed: Sarwat Robles MD  12/11/2024 6:02 PM EST  Workstation ID: [id number]       Differential Diagnosis and Discussion:    Abdominal Pain: Based on the patient's signs and symptoms, I considered abdominal aortic aneurysm, small bowel obstruction, pancreatitis, acute cholecystitis, acute appendecitis, peptic ulcer disease, gastritis, colitis, endocrine disorders, irritable bowel syndrome and other differential diagnosis an etiology of the patient's abdominal pain.    PROCEDURES:    All labs were reviewed and interpreted by me.  CT scan radiology impression was interpreted by me.    ECG 12 Lead Chest Pain   Preliminary Result   HEART RATE=82  bpm   RR Nqddjcks=075  ms   [place] Drliucvr=052  ms   P Horizontal Axis=21  deg   P Front Axis=51  deg   QRSD Interval=76  ms   QT Ehggguej=049  ms   URaK=927  ms   QRS Axis=-25  deg   T Wave Axis=43  deg   -  BORDERLINE ECG -   Sinus rhythm   Borderline left axis deviation   Borderline prolonged QT interval   Date and Time of Study:2024-12-11 18:10:40          Procedures    MDM       The patient is resting comfortably and feels better, is alert and in no distress.  The patient´s CBC that was reviewed and interpreted by me shows no abnormalities of critical concern. Of note, there is no anemia requiring a blood transfusion and the platelet count is acceptable.  The patient´s CMP that was reviewed and interpretted by me shows no abnormalities of critical concern. Of note, the patient´s sodium and potassium are acceptable. The patient´s liver enzymes are unremarkable. The patient´s renal function (creatinine) is preserved. The patient has a normal anion gap.  CT scan shows a umbilical hernia that is fat-containing.  Repeat examination is unremarkable and benign; in particular, there's no discomfort at McBurney's point and there is no pulsatile mass. The history, exam, diagnostic testing, and current condition does not suggest acute appendicitis, bowel obstruction, acute cholecystitis, bowel perforation, major gastrointestinal bleeding, severe diverticulitis, abdominal aortic aneurysm, mesenteric ischemia, volvulus, sepsis, or other significant pathology that warrants further testing, continued ED treatment, admission, for surgical evaluation at this point. The vital signs have been stable. The patient does not have uncontrollable pain, intractable vomiting, or other significant symptoms. The patient's condition is stable and appropriate for discharge from the emergency department.              Patient Care Considerations:    ANTIBIOTICS: I considered prescribing antibiotics as an outpatient however no bacterial focus of infection was found.      Consultants/Shared Management Plan:    None    Social Determinants of Health:    Patient is independent, reliable, and has access to care.       Disposition and Care  Coordination:    Discharged: I considered escalation of care by admitting this patient to the hospital, however patient will follow-up as an outpatient.    I have explained the patient´s condition, diagnoses and treatment plan based on the information available to me at this time. I have answered questions and addressed any concerns. The patient has a good  understanding of the patient´s diagnosis, condition, and treatment plan as can be expected at this point. The vital signs have been stable. The patient´s condition is stable and appropriate for discharge from the emergency department.      The patient will pursue further outpatient evaluation with the primary care physician or other designated or consulting physician as outlined in the discharge instructions. They are agreeable to this plan of care and follow-up instructions have been explained in detail. The patient has received these instructions in written format and has expressed an understanding of the discharge instructions. The patient is aware that any significant change in condition or worsening of symptoms should prompt an immediate return to this or the closest emergency department or call to 911.  I have explained discharge medications and the need for follow up with the patient/caretakers. This was also printed in the discharge instructions. Patient was discharged with the following medications and follow up:      Medication List        New Prescriptions      oxyCODONE-acetaminophen 5-325 MG per tablet  Commonly known as: PERCOCET  Take 1 tablet by mouth Every 6 (Six) Hours As Needed for Severe Pain.               Where to Get Your Medications        These medications were sent to [facility]/pharmacy #[id number] - [place], [place] - [address] - [phone]  - [phone] FX  [address] [place] [address]      Hours: 24-hours Phone: [phone]   oxyCODONE-acetaminophen 5-325 MG per tablet      Devin Abdi MD  [address]  [place] [address]  [phone]             Final diagnoses:   Umbilical hernia without obstruction and without gangrene        ED Disposition       ED Disposition   Discharge    Condition   Stable    Comment   --               This medical record created using voice recognition software.             Graham Heath MD  12/11/24 [id number]

## 2024-12-11 NOTE — ED TRIAGE NOTES
Patient to ED with abdominal pain, with a mass at the umbilicus. Patient states she's never had this issue before, denies history of hernia.

## 2024-12-13 ENCOUNTER — APPOINTMENT (OUTPATIENT)
Dept: CT IMAGING | Facility: HOSPITAL | Age: 49
End: 2024-12-13
Payer: MEDICARE

## 2024-12-13 ENCOUNTER — HOSPITAL ENCOUNTER (EMERGENCY)
Facility: HOSPITAL | Age: 49
Discharge: HOME | End: 2024-12-13
Attending: EMERGENCY MEDICINE
Payer: MEDICARE

## 2024-12-13 VITALS
TEMPERATURE: 98.7 F | WEIGHT: 188.93 LBS | SYSTOLIC BLOOD PRESSURE: 110 MMHG | OXYGEN SATURATION: 100 % | RESPIRATION RATE: 24 BRPM | BODY MASS INDEX: 34.77 KG/M2 | DIASTOLIC BLOOD PRESSURE: 75 MMHG | HEART RATE: 77 BPM | HEIGHT: 62 IN

## 2024-12-13 DIAGNOSIS — K42.9 UMBILICAL HERNIA WITHOUT OBSTRUCTION AND WITHOUT GANGRENE: Primary | ICD-10-CM

## 2024-12-13 LAB
ALBUMIN SERPL-MCNC: 3.8 G/DL (ref 3.5–5.2)
ALBUMIN/GLOB SERPL: 1.1 G/DL
ALP SERPL-CCNC: 88 U/L (ref 39–117)
ALT SERPL W P-5'-P-CCNC: 16 U/L (ref 1–33)
ANION GAP SERPL CALCULATED.3IONS-SCNC: 10.9 MMOL/L (ref 5–15)
AST SERPL-CCNC: 18 U/L (ref 1–32)
BACTERIA UR QL AUTO: ABNORMAL /HPF
BASOPHILS # BLD AUTO: 0.07 10*3/MM3 (ref 0–0.2)
BASOPHILS NFR BLD AUTO: 1.2 % (ref 0–1.5)
BILIRUB SERPL-MCNC: 0.3 MG/DL (ref 0–1.2)
BILIRUB UR QL STRIP: NEGATIVE
BUN SERPL-MCNC: 16 MG/DL (ref 6–20)
BUN/CREAT SERPL: 15.5 (ref 7–25)
CALCIUM SPEC-SCNC: 8.9 MG/DL (ref 8.6–10.5)
CHLORIDE SERPL-SCNC: 103 MMOL/L (ref 98–107)
CLARITY UR: CLEAR
CO2 SERPL-SCNC: 25.1 MMOL/L (ref 22–29)
COLOR UR: YELLOW
CREAT SERPL-MCNC: 1.03 MG/DL (ref 0.57–1)
DEPRECATED RDW RBC AUTO: 41.7 FL (ref 37–54)
EGFRCR SERPLBLD CKD-EPI 2021: 67.6 ML/MIN/1.73
EOSINOPHIL # BLD AUTO: 0.29 10*3/MM3 (ref 0–0.4)
EOSINOPHIL NFR BLD AUTO: 4.9 % (ref 0.3–6.2)
ERYTHROCYTE [DISTWIDTH] IN BLOOD BY AUTOMATED COUNT: 12.6 % (ref 12.3–15.4)
GLOBULIN UR ELPH-MCNC: 3.4 GM/DL
GLUCOSE SERPL-MCNC: 77 MG/DL (ref 65–99)
GLUCOSE UR STRIP-MCNC: NEGATIVE MG/DL
HCT VFR BLD AUTO: 42 % (ref 34–46.6)
HGB BLD-MCNC: 13.9 G/DL (ref 12–15.9)
HGB UR QL STRIP.AUTO: NEGATIVE
HOLD SPECIMEN: NORMAL
HOLD SPECIMEN: NORMAL
HYALINE CASTS UR QL AUTO: ABNORMAL /LPF
IMM GRANULOCYTES # BLD AUTO: 0.02 10*3/MM3 (ref 0–0.05)
IMM GRANULOCYTES NFR BLD AUTO: 0.3 % (ref 0–0.5)
KETONES UR QL STRIP: NEGATIVE
LEUKOCYTE ESTERASE UR QL STRIP.AUTO: ABNORMAL
LIPASE SERPL-CCNC: 34 U/L (ref 13–60)
LYMPHOCYTES # BLD AUTO: 1.65 10*3/MM3 (ref 0.7–3.1)
LYMPHOCYTES NFR BLD AUTO: 27.6 % (ref 19.6–45.3)
MCH RBC QN AUTO: 29.5 PG (ref 26.6–33)
MCHC RBC AUTO-ENTMCNC: 33.1 G/DL (ref 31.5–35.7)
MCV RBC AUTO: 89.2 FL (ref 79–97)
MONOCYTES # BLD AUTO: 0.54 10*3/MM3 (ref 0.1–0.9)
MONOCYTES NFR BLD AUTO: 9 % (ref 5–12)
NEUTROPHILS NFR BLD AUTO: 3.4 10*3/MM3 (ref 1.7–7)
NEUTROPHILS NFR BLD AUTO: 57 % (ref 42.7–76)
NITRITE UR QL STRIP: NEGATIVE
NRBC BLD AUTO-RTO: 0 /100 WBC (ref 0–0.2)
PH UR STRIP.AUTO: 5.5 [PH] (ref 5–8)
PLATELET # BLD AUTO: 255 10*3/MM3 (ref 140–450)
PMV BLD AUTO: 9.3 FL (ref 6–12)
POTASSIUM SERPL-SCNC: 4.3 MMOL/L (ref 3.5–5.2)
PROT SERPL-MCNC: 7.2 G/DL (ref 6–8.5)
PROT UR QL STRIP: NEGATIVE
RBC # BLD AUTO: 4.71 10*6/MM3 (ref 3.77–5.28)
RBC # UR STRIP: ABNORMAL /HPF
REF LAB TEST METHOD: ABNORMAL
SODIUM SERPL-SCNC: 139 MMOL/L (ref 136–145)
SP GR UR STRIP: 1.01 (ref 1–1.03)
SQUAMOUS #/AREA URNS HPF: ABNORMAL /HPF
UROBILINOGEN UR QL STRIP: ABNORMAL
WBC # UR STRIP: ABNORMAL /HPF
WBC NRBC COR # BLD AUTO: 5.97 10*3/MM3 (ref 3.4–10.8)
WHOLE BLOOD HOLD COAG: NORMAL
WHOLE BLOOD HOLD SPECIMEN: NORMAL

## 2024-12-13 PROCEDURE — 81001 URINALYSIS AUTO W/SCOPE: CPT | Performed by: EMERGENCY MEDICINE

## 2024-12-13 PROCEDURE — 36415 COLL VENOUS BLD VENIPUNCTURE: CPT

## 2024-12-13 PROCEDURE — 83690 ASSAY OF LIPASE: CPT | Performed by: EMERGENCY MEDICINE

## 2024-12-13 PROCEDURE — 25010000002 HYALURONIDASE (HUMAN) 150 UNIT/ML SOLUTION 1 ML VIAL: Performed by: EMERGENCY MEDICINE

## 2024-12-13 PROCEDURE — 25510000001 IOPAMIDOL PER 1 ML: Performed by: EMERGENCY MEDICINE

## 2024-12-13 PROCEDURE — 99285 EMERGENCY DEPT VISIT HI MDM: CPT

## 2024-12-13 PROCEDURE — 74177 CT ABD & PELVIS W/CONTRAST: CPT

## 2024-12-13 PROCEDURE — 25810000003 SODIUM CHLORIDE 0.9 % SOLUTION: Performed by: EMERGENCY MEDICINE

## 2024-12-13 PROCEDURE — 80053 COMPREHEN METABOLIC PANEL: CPT | Performed by: EMERGENCY MEDICINE

## 2024-12-13 PROCEDURE — 96375 TX/PRO/DX INJ NEW DRUG ADDON: CPT

## 2024-12-13 PROCEDURE — 25010000002 HYDROMORPHONE PER 4 MG: Performed by: EMERGENCY MEDICINE

## 2024-12-13 PROCEDURE — 96372 THER/PROPH/DIAG INJ SC/IM: CPT

## 2024-12-13 PROCEDURE — 85025 COMPLETE CBC W/AUTO DIFF WBC: CPT | Performed by: EMERGENCY MEDICINE

## 2024-12-13 PROCEDURE — 25010000002 ONDANSETRON PER 1 MG: Performed by: EMERGENCY MEDICINE

## 2024-12-13 PROCEDURE — 96374 THER/PROPH/DIAG INJ IV PUSH: CPT

## 2024-12-13 RX ORDER — HYDROMORPHONE HYDROCHLORIDE 1 MG/ML
1 INJECTION, SOLUTION INTRAMUSCULAR; INTRAVENOUS; SUBCUTANEOUS ONCE
Status: COMPLETED | OUTPATIENT
Start: 2024-12-13 | End: 2024-12-13

## 2024-12-13 RX ORDER — ONDANSETRON 2 MG/ML
4 INJECTION INTRAMUSCULAR; INTRAVENOUS ONCE
Status: COMPLETED | OUTPATIENT
Start: 2024-12-13 | End: 2024-12-13

## 2024-12-13 RX ORDER — SODIUM CHLORIDE 0.9 % (FLUSH) 0.9 %
10 SYRINGE (ML) INJECTION AS NEEDED
Status: DISCONTINUED | OUTPATIENT
Start: 2024-12-13 | End: 2024-12-13 | Stop reason: HOSPADM

## 2024-12-13 RX ORDER — IOPAMIDOL 755 MG/ML
100 INJECTION, SOLUTION INTRAVASCULAR
Status: COMPLETED | OUTPATIENT
Start: 2024-12-13 | End: 2024-12-13

## 2024-12-13 RX ORDER — OXYCODONE AND ACETAMINOPHEN 7.5; 325 MG/1; MG/1
1 TABLET ORAL EVERY 6 HOURS PRN
Qty: 12 TABLET | Refills: 0 | Status: SHIPPED | OUTPATIENT
Start: 2024-12-13

## 2024-12-13 RX ADMIN — IOPAMIDOL 100 ML: 755 INJECTION, SOLUTION INTRAVENOUS at 12:23

## 2024-12-13 RX ADMIN — HYALURONIDASE (HUMAN RECOMBINANT) 150 UNITS: 150 INJECTION, SOLUTION SUBCUTANEOUS at 12:48

## 2024-12-13 RX ADMIN — SODIUM CHLORIDE 1000 ML: 9 INJECTION, SOLUTION INTRAVENOUS at 10:46

## 2024-12-13 RX ADMIN — HYDROMORPHONE HYDROCHLORIDE 1 MG: 1 INJECTION, SOLUTION INTRAMUSCULAR; INTRAVENOUS; SUBCUTANEOUS at 10:46

## 2024-12-13 RX ADMIN — ONDANSETRON 4 MG: 2 INJECTION INTRAMUSCULAR; INTRAVENOUS at 10:46

## 2024-12-13 NOTE — ED PROVIDER NOTES
Time: 1:13 PM EST  Date of encounter:  12/13/2024  Independent Historian/Clinical History and Information was obtained by:   Patient    History is limited by: N/A    Chief Complaint: Abdominal pain      History of Present Illness:  Patient is a 49 y.o. year old female who presents to the emergency department for evaluation of abdominal pain.  Patient reports was recently diagnosed with an umbilical hernia he reports increased pain.      Patient Care Team  Primary Care Provider: Jignesh Doe    Past Medical History:     No Known Allergies  Past Medical History:   Diagnosis Date    Anxiety     Cervix cancer     Hyperlipidemia     Kidney stone     Thyroid condition      Past Surgical History:   Procedure Laterality Date    KIDNEY STONE SURGERY      LEG SURGERY      vein repair     History reviewed. No pertinent family history.    Home Medications:  Prior to Admission medications    Medication Sig Start Date End Date Taking? Authorizing Provider   ALPRAZolam (XANAX) 2 MG tablet TAKE ONE TABLET THREE TIMES DAILY AS NEEDED 10/11/24   Julissa Reed MD   busPIRone (BUSPAR) 10 MG tablet Take 1 tablet by mouth Every 12 (Twelve) Hours. 9/12/24   Julissa Reed MD   cephalexin (KEFLEX) 500 MG capsule Take 1 capsule by mouth 3 (Three) Times a Day. 12/11/24   Graham Heath MD   cloNIDine (CATAPRES) 0.1 MG tablet Take 1 tablet by mouth Every 12 (Twelve) Hours. 10/11/24   Julissa Reed MD   cyclobenzaprine (FLEXERIL) 5 MG tablet Take 1 tablet by mouth 3 times a day. 9/1/24   Julissa Reed MD   FLUoxetine (PROzac) 20 MG capsule Take 1 capsule by mouth Daily. 10/11/24   Julissa Reed MD   gabapentin (NEURONTIN) 600 MG tablet Take 1 tablet by mouth 2 (Two) Times a Day.    Julissa Reed MD   HYDROcodone-acetaminophen (NORCO) 7.5-325 MG per tablet Take 1 tablet by mouth Every 6 (Six) Hours. 10/26/24   Devin Hernandez DO   hydrOXYzine (ATARAX) 50 MG tablet Take 1 tablet by mouth 2 (Two)  "Times a Day As Needed. 10/11/24   Julissa Reed MD   levothyroxine (SYNTHROID, LEVOTHROID) 100 MCG tablet  8/30/24   Julissa Reed MD   oxyCODONE-acetaminophen (PERCOCET) 5-325 MG per tablet Take 1 tablet by mouth Every 6 (Six) Hours As Needed for Severe Pain. 12/11/24   Graham Heath MD   oxyCODONE-acetaminophen (PERCOCET) 7.5-325 MG per tablet Take 1 tablet by mouth Every 6 (Six) Hours As Needed for Severe Pain. 12/13/24   Adithya Plaza MD   predniSONE (DELTASONE) 50 MG tablet Take 1 tablet by mouth Daily. 10/26/24   Devin Hernandez DO        Social History:   Social History     Tobacco Use    Smoking status: Every Day     Current packs/day: 1.00     Types: Cigarettes   Substance Use Topics    Alcohol use: Not Currently    Drug use: Never         Review of Systems:  Review of Systems   Constitutional:  Negative for chills and fever.   HENT:  Negative for congestion, rhinorrhea and sore throat.    Eyes:  Negative for pain and visual disturbance.   Respiratory:  Negative for apnea, cough, chest tightness and shortness of breath.    Cardiovascular:  Negative for chest pain and palpitations.   Gastrointestinal:  Positive for abdominal pain. Negative for diarrhea, nausea and vomiting.   Genitourinary:  Negative for difficulty urinating and dysuria.   Musculoskeletal:  Negative for joint swelling and myalgias.   Skin:  Negative for color change.   Neurological:  Negative for seizures and headaches.   Psychiatric/Behavioral: Negative.     All other systems reviewed and are negative.       Physical Exam:  /75   Pulse 77   Temp 98.6 °F (37 °C) (Oral)   Resp 24   Ht 157.5 cm (62\")   Wt 85.7 kg (188 lb 15 oz)   SpO2 100%   BMI 34.56 kg/m²     Physical Exam  Vitals and nursing note reviewed.   Constitutional:       General: She is not in acute distress.     Appearance: Normal appearance. She is not toxic-appearing.   HENT:      Head: Normocephalic and atraumatic.      Jaw: There is normal jaw " occlusion.   Eyes:      General: Lids are normal.      Extraocular Movements: Extraocular movements intact.      Conjunctiva/sclera: Conjunctivae normal.      Pupils: Pupils are equal, round, and reactive to light.   Cardiovascular:      Rate and Rhythm: Normal rate and regular rhythm.      Pulses: Normal pulses.      Heart sounds: Normal heart sounds.   Pulmonary:      Effort: Pulmonary effort is normal. No respiratory distress.      Breath sounds: Normal breath sounds. No wheezing or rhonchi.   Abdominal:      General: Abdomen is flat.      Palpations: Abdomen is soft.      Tenderness: There is abdominal tenderness (Supraumbilical hernia). There is no guarding or rebound.   Musculoskeletal:         General: Normal range of motion.      Cervical back: Normal range of motion and neck supple.      Right lower leg: No edema.      Left lower leg: No edema.   Skin:     General: Skin is warm and dry.   Neurological:      Mental Status: She is alert and oriented to person, place, and time. Mental status is at baseline.   Psychiatric:         Mood and Affect: Mood normal.                    Medical Decision Making:      Comorbidities that affect care:    Anxiety, obesity    External Notes reviewed:    None      The following orders were placed and all results were independently analyzed by me:  Orders Placed This Encounter   Procedures    CT Abdomen Pelvis With Contrast    [place] Draw    Comprehensive Metabolic Panel    Lipase    Urinalysis With Microscopic If Indicated (No Culture) - Urine, Clean Catch    CBC Auto Differential    Urinalysis, Microscopic Only - Urine, Clean Catch    NPO Diet NPO Type: Strict NPO    Undress & Gown    Extravasation Standing Orders - IMMEDIATELY STOP INFUSION    Extravasation Standing Orders - DO NOT REMOVE CATHETER / NEEDLE    Extravasation Standing Orders - AVOID PRESSURE    Extravasation Standing Orders - Disconnect the IV Administration Set    Extravasation Standing Orders - Evaluate the  Site for Extravasation of Fluid (Check for Blood Return)    Extravasation Standing Orders - Aspirate as Much of the Medication From the Needle / Cannula As Possible Using A Small (1-5mL) Syringe - Verify Recommended Treatment - If Antidote Does Not Require Infusion Through Extravasated Line Remove Needle / Cannula After Aspiration    Extravasation Standing Orders - Terrance Around the Area With A Pen    Extravasation Standing Orders - Photograph the Area for Medical Record If Indicated Per Photo Policy    Extravasation Standing Orders - Elevate Affected Extremity for 24-48 Hours    Extravasation Standing Orders - Encourage Active Range of Motion After 48 Hours    Notify Provider Prior to Administration of Antidote - Extravasation Standing Orders    Notify Provider for Tissue Sloughing, Necrosis or Blistering at Extravasation Site    Indicate Extravasated Medication to Determine Antidote to Initiate    Apply Warm Compress to Affected Area for 20 Minutes    Insert Peripheral IV    CBC & Differential    Green Top (Gel)    Lavender Top    Gold Top - SST    Light Blue Top       Medications Given in the Emergency Department:  Medications   sodium chloride 0.9 % flush 10 mL (has no administration in time range)   HYDROmorphone PF (DILAUDID) injection 1 mg (1 mg Intravenous Given 12/13/24 1046)   ondansetron (ZOFRAN) injection 4 mg (4 mg Intravenous Given 12/13/24 1046)   sodium chloride 0.9 % bolus 1,000 mL (0 mL Intravenous Stopped 12/13/24 1339)   hyaluronidase 150 units in NS 10 ml syringe (150 Units Subcutaneous Given 12/13/24 1248)   iopamidol (ISOVUE-370) 76 % injection 100 mL (100 mL Intravenous Given 12/13/24 1223)        ED Course:         Labs:    Lab Results (last 24 hours)       Procedure Component Value Units Date/Time    CBC & Differential [[id number]]  (Normal) Collected: 12/13/24 0858    Specimen: Blood Updated: 12/13/24 0904    Narrative:      The following orders were created for panel order CBC &  Differential.  Procedure                               Abnormality         Status                     ---------                               -----------         ------                     CBC Auto Differential[[id number]]        Normal              Final result                 Please view results for these tests on the individual orders.    Comprehensive Metabolic Panel [[id number]]  (Abnormal) Collected: 12/13/24 0858    Specimen: Blood Updated: 12/13/24 0920     Glucose 77 mg/dL      BUN 16 mg/dL      Creatinine 1.03 mg/dL      Sodium 139 mmol/L      Potassium 4.3 mmol/L      Chloride 103 mmol/L      CO2 25.1 mmol/L      Calcium 8.9 mg/dL      Total Protein 7.2 g/dL      Albumin 3.8 g/dL      ALT (SGPT) 16 U/L      AST (SGOT) 18 U/L      Alkaline Phosphatase 88 U/L      Total Bilirubin 0.3 mg/dL      Globulin 3.4 gm/dL      A/G Ratio 1.1 g/dL      BUN/Creatinine Ratio 15.5     Anion Gap 10.9 mmol/L      eGFR 67.6 mL/min/1.73     Narrative:      GFR Categories in Chronic Kidney Disease (CKD)      GFR Category          GFR (mL/min/1.73)    Interpretation  G1                     90 or greater         Normal or high (1)  G2                      60-89                Mild decrease (1)  G3a                   45-59                Mild to moderate decrease  G3b                   30-44                Moderate to severe decrease  G4                    15-29                Severe decrease  G5                    14 or less           Kidney failure          (1)In the absence of evidence of kidney disease, neither GFR category G1 or G2 fulfill the criteria for CKD.    eGFR calculation 2021 CKD-EPI creatinine equation, which does not include race as a factor    Lipase [[id number]]  (Normal) Collected: 12/13/24 0858    Specimen: Blood Updated: 12/13/24 0920     Lipase 34 U/L     CBC Auto Differential [[id number]]  (Normal) Collected: 12/13/24 0858    Specimen: Blood Updated: 12/13/24 0904     WBC 5.97 10*3/mm3      RBC 4.71 10*6/mm3       Hemoglobin 13.9 g/dL      Hematocrit 42.0 %      MCV 89.2 fL      MCH 29.5 pg      MCHC 33.1 g/dL      RDW 12.6 %      RDW-SD 41.7 fl      MPV 9.3 fL      Platelets 255 10*3/mm3      Neutrophil % 57.0 %      Lymphocyte % 27.6 %      Monocyte % 9.0 %      Eosinophil % 4.9 %      Basophil % 1.2 %      Immature Grans % 0.3 %      Neutrophils, Absolute 3.40 10*3/mm3      Lymphocytes, Absolute 1.65 10*3/mm3      Monocytes, Absolute 0.54 10*3/mm3      Eosinophils, Absolute 0.29 10*3/mm3      Basophils, Absolute 0.07 10*3/mm3      Immature Grans, Absolute 0.02 10*3/mm3      nRBC 0.0 /100 WBC     Urinalysis With Microscopic If Indicated (No Culture) - Urine, Clean Catch [[id number]]  (Abnormal) Collected: 12/13/24 1053    Specimen: Urine, Clean Catch Updated: 12/13/24 1110     Color, UA Yellow     Appearance, UA Clear     pH, UA 5.5     Specific Gravity, UA 1.012     Glucose, UA Negative     Ketones, UA Negative     Bilirubin, UA Negative     Blood, UA Negative     Protein, UA Negative     Leuk Esterase, UA Trace     Nitrite, UA Negative     Urobilinogen, UA 0.2 E.U./dL    Urinalysis, Microscopic Only - Urine, Clean Catch [[id number]]  (Abnormal) Collected: 12/13/24 1053    Specimen: Urine, Clean Catch Updated: 12/13/24 1133     RBC, UA None Seen /HPF      WBC, UA 3-5 /HPF      Bacteria, UA 1+ /HPF      Squamous Epithelial Cells, UA 7-12 /HPF      Hyaline Casts, UA None Seen /LPF      Methodology Manual Light Microscopy             Imaging:    CT Abdomen Pelvis With Contrast    Result Date: 12/13/2024  CT ABDOMEN PELVIS W CONTRAST Date of Exam: 12/13/2024 12:02 PM EST Indication: Umbilical hernia worsening pain, firm with overlying erythema. Comparison: 12/11/2024. Technique: Axial CT images were obtained of the abdomen and pelvis after the uneventful intravenous administration of iodinated contrast. Reconstructed coronal and sagittal images were also obtained. Automated exposure control and iterative construction methods  were used. Findings: There is an ovoid shaped enhancing lesion in the right lobe of the liver beneath the right hemidiaphragm felt to represent a benign cavernous hemangioma measuring 2.1 x 1.4 cm. The remaining liver parenchyma is normal. The gallbladder, pancreas, adrenal glands, spleen, and both kidneys are unremarkable. The uterus, adnexal structures, and urinary bladder are normal. The appendix is normal. There are no dilated loops of bowel to indicate an obstructive process. There is no abnormal bowel wall thickening.  There is mild increased stool burden. The patient has a fat-containing hernia above the umbilicus. The abdominal wall defect measures 2.2 cm in diameter. There are no enlarged lymph nodes or abnormal fluid collections. There are a few scattered atherosclerotic vascular calcifications. There is normal vascular enhancement. There is minor scarring in the lung bases. There are no suspicious osteolytic or sclerotic lesions within the bony structures.     Impression: 1.There is a fat-containing hernia above the umbilicus. The abdominal wall defect measures 2.2 cm in diameter. 2.Benign cavernous hemangioma in the right lobe of the liver. 3.Mild increased stool burden. Electronically Signed: Tavo Lackey MD  12/13/2024 12:34 PM EST  Workstation ID: [id number]       Differential Diagnosis and Discussion:    Abdominal Pain: Based on the patient's signs and symptoms, I considered abdominal aortic aneurysm, small bowel obstruction, pancreatitis, acute cholecystitis, acute appendecitis, peptic ulcer disease, gastritis, colitis, endocrine disorders, irritable bowel syndrome and other differential diagnosis an etiology of the patient's abdominal pain.    PROCEDURES:    Labs were drawn in the emergency department and all labs were reviewed and interpreted by me.  CT scan was performed in the emergency department and the CT scan radiology impression was interpreted by me.    No orders to display        Procedures    MDM  Number of Diagnoses or Management Options  Umbilical hernia without obstruction and without gangrene  Diagnosis management comments: In summary this is a 49-year-old female patient who presents for evaluation again of her umbilical hernia.  She does report increased pain with the hernia.  CBC independently reviewed and interpreted by me and shows no critical abnormalities.  CMP independently reviewed and interpreted by me and shows no critical abnormalities.  CT scan abdomen pelvis shows fat-containing umbilical hernia without bowel.  No bowel obstruction identified either.  Patient has been given pain medication emerged department will be discharged home with prescription pain medication as well.  Very strict return to ER and follow-up instructions have been provided to the patient.  Surgical follow-up instructions given                       Patient Care Considerations:    CONSULT: I considered consulting general surgery, however no incarcerated or strangulated hernia identified      Consultants/Shared Management Plan:    None    Social Determinants of Health:    Patient is independent, reliable, and has access to care.       Disposition and Care Coordination:    Discharged: I considered escalation of care by admitting this patient to the hospital, however no incarcerated or strangulated hernia identified    I have explained the patient´s condition, diagnoses and treatment plan based on the information available to me at this time. I have answered questions and addressed any concerns. The patient has a good  understanding of the patient´s diagnosis, condition, and treatment plan as can be expected at this point. The vital signs have been stable. The patient´s condition is stable and appropriate for discharge from the emergency department.      The patient will pursue further outpatient evaluation with the primary care physician or other designated or consulting physician as outlined in the  discharge instructions. They are agreeable to this plan of care and follow-up instructions have been explained in detail. The patient has received these instructions in written format and has expressed an understanding of the discharge instructions. The patient is aware that any significant change in condition or worsening of symptoms should prompt an immediate return to this or the closest emergency department or call to 911.  I have explained discharge medications and the need for follow up with the patient/caretakers. This was also printed in the discharge instructions. Patient was discharged with the following medications and follow up:      Medication List        Changed      * oxyCODONE-acetaminophen 5-325 MG per tablet  Commonly known as: PERCOCET  Take 1 tablet by mouth Every 6 (Six) Hours As Needed for Severe Pain.  What changed: Another medication with the same name was added. Make sure you understand how and when to take each.     * oxyCODONE-acetaminophen 7.5-325 MG per tablet  Commonly known as: PERCOCET  Take 1 tablet by mouth Every 6 (Six) Hours As Needed for Severe Pain.  What changed: You were already taking a medication with the same name, and this prescription was added. Make sure you understand how and when to take each.           * This list has 2 medication(s) that are the same as other medications prescribed for you. Read the directions carefully, and ask your doctor or other care provider to review them with you.                   Where to Get Your Medications        These medications were sent to [facility]/pharmacy #[id number] - [place], [place] - [address] - [phone]  - [phone]   [address] [place] [address]      Hours: 24-hours Phone: [phone]   oxyCODONE-acetaminophen 7.5-325 MG per tablet      Jignesh Doe  [id number] [facility]  [place] [address] [phone]    Call          Final diagnoses:   Umbilical hernia without obstruction and without gangrene         ED Disposition       ED Disposition   Discharge    Condition   Stable    Comment   --               This medical record created using voice recognition software.             Adithya Plaza MD  12/13/24 2352

## 2024-12-13 NOTE — Clinical Note
[facility] EMERGENCY ROOM  [address] [place] [address]  Phone: [phone]  Fax: [phone]    Kacey Kelly accompanied Velma Andres to the emergency department on 12/13/2024. They may return to school on 12/16/2024.          Thank you for choosing [facility].      Adithya Plaza MD

## 2024-12-13 NOTE — Clinical Note
[facility] EMERGENCY ROOM  [address] [place] [address]  Phone: [phone]  Fax: [phone]    Srinivasan Heredia accompanied Velma Andres to the emergency department on 12/13/2024. They may return to school on 12/16/2024.          Thank you for choosing [facility].      Adithya Plaza MD

## 2024-12-15 ENCOUNTER — APPOINTMENT (OUTPATIENT)
Dept: CT IMAGING | Facility: HOSPITAL | Age: 49
End: 2024-12-15
Payer: MEDICARE

## 2024-12-15 ENCOUNTER — HOSPITAL ENCOUNTER (EMERGENCY)
Facility: HOSPITAL | Age: 49
Discharge: HOME | End: 2024-12-15
Attending: EMERGENCY MEDICINE | Admitting: EMERGENCY MEDICINE
Payer: MEDICARE

## 2024-12-15 VITALS
RESPIRATION RATE: 16 BRPM | DIASTOLIC BLOOD PRESSURE: 75 MMHG | SYSTOLIC BLOOD PRESSURE: 116 MMHG | BODY MASS INDEX: 34.56 KG/M2 | TEMPERATURE: 99.9 F | HEIGHT: 62 IN | OXYGEN SATURATION: 98 % | HEART RATE: 90 BPM

## 2024-12-15 DIAGNOSIS — K42.9 UMBILICAL HERNIA WITHOUT OBSTRUCTION AND WITHOUT GANGRENE: Primary | ICD-10-CM

## 2024-12-15 LAB
ALBUMIN SERPL-MCNC: 3.7 G/DL (ref 3.5–5.2)
ALBUMIN/GLOB SERPL: 1.1 G/DL
ALP SERPL-CCNC: 85 U/L (ref 39–117)
ALT SERPL W P-5'-P-CCNC: 13 U/L (ref 1–33)
ANION GAP SERPL CALCULATED.3IONS-SCNC: 14.8 MMOL/L (ref 5–15)
AST SERPL-CCNC: 16 U/L (ref 1–32)
BACTERIA UR QL AUTO: ABNORMAL /HPF
BASOPHILS # BLD AUTO: 0.08 10*3/MM3 (ref 0–0.2)
BASOPHILS NFR BLD AUTO: 0.8 % (ref 0–1.5)
BILIRUB SERPL-MCNC: 0.4 MG/DL (ref 0–1.2)
BILIRUB UR QL STRIP: NEGATIVE
BUN SERPL-MCNC: 14 MG/DL (ref 6–20)
BUN/CREAT SERPL: 13 (ref 7–25)
CALCIUM SPEC-SCNC: 8.9 MG/DL (ref 8.6–10.5)
CHLORIDE SERPL-SCNC: 98 MMOL/L (ref 98–107)
CLARITY UR: CLEAR
CO2 SERPL-SCNC: 23.2 MMOL/L (ref 22–29)
COLOR UR: YELLOW
CREAT SERPL-MCNC: 1.08 MG/DL (ref 0.57–1)
D-LACTATE SERPL-SCNC: 1.1 MMOL/L (ref 0.5–2)
DEPRECATED RDW RBC AUTO: 42.7 FL (ref 37–54)
EGFRCR SERPLBLD CKD-EPI 2021: 63.9 ML/MIN/1.73
EOSINOPHIL # BLD AUTO: 0.27 10*3/MM3 (ref 0–0.4)
EOSINOPHIL NFR BLD AUTO: 2.8 % (ref 0.3–6.2)
ERYTHROCYTE [DISTWIDTH] IN BLOOD BY AUTOMATED COUNT: 12.8 % (ref 12.3–15.4)
GLOBULIN UR ELPH-MCNC: 3.5 GM/DL
GLUCOSE SERPL-MCNC: 111 MG/DL (ref 65–99)
GLUCOSE UR STRIP-MCNC: NEGATIVE MG/DL
HCT VFR BLD AUTO: 40.8 % (ref 34–46.6)
HGB BLD-MCNC: 13.3 G/DL (ref 12–15.9)
HGB UR QL STRIP.AUTO: ABNORMAL
HOLD SPECIMEN: NORMAL
HOLD SPECIMEN: NORMAL
HYALINE CASTS UR QL AUTO: ABNORMAL /LPF
IMM GRANULOCYTES # BLD AUTO: 0.03 10*3/MM3 (ref 0–0.05)
IMM GRANULOCYTES NFR BLD AUTO: 0.3 % (ref 0–0.5)
KETONES UR QL STRIP: NEGATIVE
LEUKOCYTE ESTERASE UR QL STRIP.AUTO: NEGATIVE
LIPASE SERPL-CCNC: 21 U/L (ref 13–60)
LYMPHOCYTES # BLD AUTO: 2.17 10*3/MM3 (ref 0.7–3.1)
LYMPHOCYTES NFR BLD AUTO: 22.1 % (ref 19.6–45.3)
MCH RBC QN AUTO: 29.6 PG (ref 26.6–33)
MCHC RBC AUTO-ENTMCNC: 32.6 G/DL (ref 31.5–35.7)
MCV RBC AUTO: 90.9 FL (ref 79–97)
MONOCYTES # BLD AUTO: 1.27 10*3/MM3 (ref 0.1–0.9)
MONOCYTES NFR BLD AUTO: 12.9 % (ref 5–12)
NEUTROPHILS NFR BLD AUTO: 5.99 10*3/MM3 (ref 1.7–7)
NEUTROPHILS NFR BLD AUTO: 61.1 % (ref 42.7–76)
NITRITE UR QL STRIP: NEGATIVE
NRBC BLD AUTO-RTO: 0 /100 WBC (ref 0–0.2)
PH UR STRIP.AUTO: 7 [PH] (ref 5–8)
PLATELET # BLD AUTO: 264 10*3/MM3 (ref 140–450)
PMV BLD AUTO: 9.7 FL (ref 6–12)
POTASSIUM SERPL-SCNC: 4.2 MMOL/L (ref 3.5–5.2)
PROT SERPL-MCNC: 7.2 G/DL (ref 6–8.5)
PROT UR QL STRIP: NEGATIVE
RBC # BLD AUTO: 4.49 10*6/MM3 (ref 3.77–5.28)
RBC # UR STRIP: ABNORMAL /HPF
REF LAB TEST METHOD: ABNORMAL
SODIUM SERPL-SCNC: 136 MMOL/L (ref 136–145)
SP GR UR STRIP: >1.03 (ref 1–1.03)
SQUAMOUS #/AREA URNS HPF: ABNORMAL /HPF
UROBILINOGEN UR QL STRIP: ABNORMAL
WBC # UR STRIP: ABNORMAL /HPF
WBC NRBC COR # BLD AUTO: 9.81 10*3/MM3 (ref 3.4–10.8)
WHOLE BLOOD HOLD COAG: NORMAL
WHOLE BLOOD HOLD SPECIMEN: NORMAL

## 2024-12-15 PROCEDURE — 74177 CT ABD & PELVIS W/CONTRAST: CPT

## 2024-12-15 PROCEDURE — 85025 COMPLETE CBC W/AUTO DIFF WBC: CPT

## 2024-12-15 PROCEDURE — 96374 THER/PROPH/DIAG INJ IV PUSH: CPT

## 2024-12-15 PROCEDURE — 81001 URINALYSIS AUTO W/SCOPE: CPT

## 2024-12-15 PROCEDURE — 25510000001 IOPAMIDOL PER 1 ML: Performed by: EMERGENCY MEDICINE

## 2024-12-15 PROCEDURE — 36415 COLL VENOUS BLD VENIPUNCTURE: CPT

## 2024-12-15 PROCEDURE — 99285 EMERGENCY DEPT VISIT HI MDM: CPT

## 2024-12-15 PROCEDURE — 25010000002 ONDANSETRON PER 1 MG: Performed by: EMERGENCY MEDICINE

## 2024-12-15 PROCEDURE — 25010000002 MORPHINE PER 10 MG: Performed by: EMERGENCY MEDICINE

## 2024-12-15 PROCEDURE — 96375 TX/PRO/DX INJ NEW DRUG ADDON: CPT

## 2024-12-15 PROCEDURE — 25010000002 DIAZEPAM PER 5 MG: Performed by: EMERGENCY MEDICINE

## 2024-12-15 PROCEDURE — 80053 COMPREHEN METABOLIC PANEL: CPT

## 2024-12-15 PROCEDURE — 96376 TX/PRO/DX INJ SAME DRUG ADON: CPT

## 2024-12-15 PROCEDURE — 25010000002 HYDROMORPHONE PER 4 MG: Performed by: EMERGENCY MEDICINE

## 2024-12-15 PROCEDURE — 83605 ASSAY OF LACTIC ACID: CPT | Performed by: EMERGENCY MEDICINE

## 2024-12-15 PROCEDURE — 83690 ASSAY OF LIPASE: CPT

## 2024-12-15 RX ORDER — IOPAMIDOL 755 MG/ML
100 INJECTION, SOLUTION INTRAVASCULAR
Status: COMPLETED | OUTPATIENT
Start: 2024-12-15 | End: 2024-12-15

## 2024-12-15 RX ORDER — SODIUM CHLORIDE 0.9 % (FLUSH) 0.9 %
10 SYRINGE (ML) INJECTION AS NEEDED
Status: DISCONTINUED | OUTPATIENT
Start: 2024-12-15 | End: 2024-12-15 | Stop reason: HOSPADM

## 2024-12-15 RX ORDER — DOCUSATE SODIUM 100 MG/1
100 CAPSULE, LIQUID FILLED ORAL 2 TIMES DAILY
Qty: 10 CAPSULE | Refills: 0 | Status: SHIPPED | OUTPATIENT
Start: 2024-12-15

## 2024-12-15 RX ORDER — CEPHALEXIN 500 MG/1
500 CAPSULE ORAL 4 TIMES DAILY
Qty: 28 CAPSULE | Refills: 0 | Status: SHIPPED | OUTPATIENT
Start: 2024-12-15 | End: 2024-12-15 | Stop reason: ALTCHOICE

## 2024-12-15 RX ORDER — HYDROMORPHONE HYDROCHLORIDE 1 MG/ML
1 INJECTION, SOLUTION INTRAMUSCULAR; INTRAVENOUS; SUBCUTANEOUS ONCE
Status: COMPLETED | OUTPATIENT
Start: 2024-12-15 | End: 2024-12-15

## 2024-12-15 RX ORDER — DIAZEPAM 10 MG/2ML
5 INJECTION, SOLUTION INTRAMUSCULAR; INTRAVENOUS ONCE
Status: COMPLETED | OUTPATIENT
Start: 2024-12-15 | End: 2024-12-15

## 2024-12-15 RX ORDER — ONDANSETRON 2 MG/ML
4 INJECTION INTRAMUSCULAR; INTRAVENOUS ONCE
Status: COMPLETED | OUTPATIENT
Start: 2024-12-15 | End: 2024-12-15

## 2024-12-15 RX ADMIN — ONDANSETRON 4 MG: 2 INJECTION INTRAMUSCULAR; INTRAVENOUS at 17:35

## 2024-12-15 RX ADMIN — MORPHINE SULFATE 4 MG: 4 INJECTION, SOLUTION INTRAMUSCULAR; INTRAVENOUS at 17:35

## 2024-12-15 RX ADMIN — IOPAMIDOL 100 ML: 755 INJECTION, SOLUTION INTRAVENOUS at 17:54

## 2024-12-15 RX ADMIN — DIAZEPAM 5 MG: 5 INJECTION, SOLUTION INTRAMUSCULAR; INTRAVENOUS at 18:50

## 2024-12-15 RX ADMIN — MORPHINE SULFATE 4 MG: 4 INJECTION, SOLUTION INTRAMUSCULAR; INTRAVENOUS at 20:26

## 2024-12-15 RX ADMIN — HYDROMORPHONE HYDROCHLORIDE 1 MG: 1 INJECTION, SOLUTION INTRAMUSCULAR; INTRAVENOUS; SUBCUTANEOUS at 18:51

## 2024-12-15 NOTE — ED PROVIDER NOTES
Time: 4:21 PM EST  Date of encounter:  12/15/2024  Independent Historian/Clinical History and Information was obtained by:   Patient    History is limited by: N/A    Chief Complaint   Patient presents with    Abdominal Pain     Pt was here on Friday 12/13 with hiatal hernia. Pt thinks her hernia is infected and states different color in the center of her abdomen.         History of Present Illness:  Patient is a 49 y.o. year old female who presents to the emergency department for evaluation of abdominal pain pain and erythema.  Patient states she also feels bloated and has not passed gas.  Patient states she was diagnosed with umbilical hernia on December 11.  Had been having pain since 12/10. Since then she has been here 2 days ago for abdominal pain and erythema.  She states the erythema and warmth is worsening.  She states she started having dysuria and decreased urine output last night.  She also complains of constipation and states last bowel movement was 2 days ago.  She states she has been taking her Percocet as prescribed for pain with last dose around 2 PM but they are not helping.  She admits to nausea and chills and denies vomiting and fevers.  Denies a prior abdominal surgery.    Patient Care Team  Primary Care Provider: Jignesh Doe    Past Medical History:     No Known Allergies  Past Medical History:   Diagnosis Date    Anxiety     Cervix cancer     Hernia of abdominal wall     Hyperlipidemia     Kidney stone     Thyroid condition      Past Surgical History:   Procedure Laterality Date    KIDNEY STONE SURGERY      LEG SURGERY      vein repair     History reviewed. No pertinent family history.    Home Medications:  Prior to Admission medications    Medication Sig Start Date End Date Taking? Authorizing Provider   ALPRAZolam (XANAX) 2 MG tablet TAKE ONE TABLET THREE TIMES DAILY AS NEEDED 10/11/24   Provider, MD Julissa   busPIRone (BUSPAR) 10 MG tablet Take 1 tablet by mouth Every 12 (Twelve)  Hours. 9/12/24   Julissa Reed MD   cephalexin (KEFLEX) 500 MG capsule Take 1 capsule by mouth 3 (Three) Times a Day. 12/11/24   Graham Heath MD   cloNIDine (CATAPRES) 0.1 MG tablet Take 1 tablet by mouth Every 12 (Twelve) Hours. 10/11/24   Julissa Reed MD   cyclobenzaprine (FLEXERIL) 5 MG tablet Take 1 tablet by mouth 3 times a day. 9/1/24   Julissa Reed MD   FLUoxetine (PROzac) 20 MG capsule Take 1 capsule by mouth Daily. 10/11/24   Julissa Reed MD   gabapentin (NEURONTIN) 600 MG tablet Take 1 tablet by mouth 2 (Two) Times a Day.    Julissa Reed MD   HYDROcodone-acetaminophen (NORCO) 7.5-325 MG per tablet Take 1 tablet by mouth Every 6 (Six) Hours. 10/26/24   Devin Hernandez DO   hydrOXYzine (ATARAX) 50 MG tablet Take 1 tablet by mouth 2 (Two) Times a Day As Needed. 10/11/24   Julissa Reed MD   levothyroxine (SYNTHROID, LEVOTHROID) 100 MCG tablet  8/30/24   Julissa Reed MD   oxyCODONE-acetaminophen (PERCOCET) 5-325 MG per tablet Take 1 tablet by mouth Every 6 (Six) Hours As Needed for Severe Pain. 12/11/24   Graham Heath MD   oxyCODONE-acetaminophen (PERCOCET) 7.5-325 MG per tablet Take 1 tablet by mouth Every 6 (Six) Hours As Needed for Severe Pain. 12/13/24   Adithya Plaza MD   predniSONE (DELTASONE) 50 MG tablet Take 1 tablet by mouth Daily. 10/26/24   Devin Hernandez DO        Social History:   Social History     Tobacco Use    Smoking status: Every Day     Current packs/day: 1.00     Types: Cigarettes   Substance Use Topics    Alcohol use: Not Currently    Drug use: Never         Review of Systems:  Review of Systems   Constitutional: Negative.  Positive for chills. Negative for fever.   HENT: Negative.     Eyes: Negative.    Respiratory: Negative.     Cardiovascular: Negative.    Gastrointestinal: Negative.  Positive for abdominal distention, abdominal pain, constipation and nausea. Negative for vomiting.   Endocrine: Negative.   "  Genitourinary: Negative.  Positive for decreased urine volume and dysuria.   Musculoskeletal: Negative.    Skin: Negative.    Allergic/Immunologic: Negative.    Neurological: Negative.    Hematological: Negative.    Psychiatric/Behavioral: Negative.          Physical Exam:  /77   Pulse 90   Temp 99.9 °F (37.7 °C) (Oral)   Resp 16   Ht 157.5 cm (62\")   SpO2 98%   BMI 34.56 kg/m²         Physical Exam  Vitals and nursing note reviewed.   Constitutional:       Appearance: Normal appearance.   HENT:      Head: Normocephalic and atraumatic.      Nose: Nose normal.      Mouth/Throat:      Mouth: Mucous membranes are moist.   Eyes:      Extraocular Movements: Extraocular movements intact.      Conjunctiva/sclera: Conjunctivae normal.      Pupils: Pupils are equal, round, and reactive to light.   Cardiovascular:      Rate and Rhythm: Normal rate and regular rhythm.      Heart sounds: Normal heart sounds.   Pulmonary:      Effort: Pulmonary effort is normal.      Breath sounds: Normal breath sounds.   Abdominal:      General: Abdomen is flat. Bowel sounds are normal. There is distension.      Palpations: Abdomen is soft.      Tenderness: There is abdominal tenderness in the periumbilical area.      Hernia: A hernia is present. Hernia is present in the umbilical area.       Musculoskeletal:         General: Normal range of motion.      Cervical back: Normal range of motion and neck supple.   Skin:     General: Skin is warm and dry.   Neurological:      General: No focal deficit present.      Mental Status: She is alert and oriented to person, place, and time.   Psychiatric:         Mood and Affect: Mood normal.         Behavior: Behavior normal.                        Medical Decision Making:      Comorbidities that affect care:    None    External Notes reviewed:    Previous ED Note: [facility] ED visit 12/11/2024 and 12/13/2024 both for umbilical hernia and Previous Radiological Studies: CT abdomen pelvis from " 12/11/2024 showing a fat-containing umbilical hernia CT abdomen pelvis from 12/13/2024 showing a fat-containing umbilical hernia with a abdominal wall defect measuring 2.2 cm in diameter      The following orders were placed and all results were independently analyzed by me:  Orders Placed This Encounter   Procedures    CT Abdomen Pelvis With Contrast    [place] Draw    Comprehensive Metabolic Panel    Lipase    Urinalysis With Microscopic If Indicated (No Culture) - Urine, Clean Catch    CBC Auto Differential    Lactic Acid, Plasma    Urinalysis, Microscopic Only - Urine, Clean Catch    NPO Diet NPO Type: Strict NPO    Undress & Gown    IP Consult to General Surgery    Insert Peripheral IV    CBC & Differential    Green Top (Gel)    Lavender Top    Gold Top - SST    Light Blue Top       Medications Given in the Emergency Department:  Medications   sodium chloride 0.9 % flush 10 mL (has no administration in time range)   morphine injection 4 mg (has no administration in time range)   morphine injection 4 mg (4 mg Intravenous Given 12/15/24 1735)   ondansetron (ZOFRAN) injection 4 mg (4 mg Intravenous Given 12/15/24 1735)   iopamidol (ISOVUE-370) 76 % injection 100 mL (100 mL Intravenous Given 12/15/24 1754)   HYDROmorphone PF (DILAUDID) injection 1 mg (1 mg Intravenous Given 12/15/24 1851)   diazePAM (VALIUM) injection 5 mg (5 mg Intravenous Given 12/15/24 1850)        ED Course:    The patient was initially evaluated in the triage area where orders were placed. The patient was later dispositioned by Reynold Álvarez PA-C.      The patient was advised to stay for completion of workup which includes but is not limited to communication of labs and radiological results, reassessment and plan. The patient was advised that leaving prior to disposition by a provider could result in critical findings that are not communicated to the patient.     ED Course as of 12/15/24 2026   Sun Dec 15, 2024   1831 Dr. Portillo and CARROLL  have tried to reduce the hernia.  Unsuccessful due to patient's pain. [AJ]   1835 Cosulted with general surgeon Dr. Duncan.  She states the hernia is fat-containing and is nonemergent.  She is scheduled to see Dr. Abdi next week and advised the patient call first thing Monday morning to get her appointment moved up. [AJ]   1931 We have Reduced the umbilical hernia as best we can. [AJ]   2007 Got up to go to the restroom.  She came back to the room in was pronounced.  Dr. Patel and I  both attempted to reduce it.  [AJ]      ED Course User Index  [AJ] Reynold Álvarez, ELAINE       Labs:    Lab Results (last 24 hours)       Procedure Component Value Units Date/Time    CBC & Differential [[id number]]  (Abnormal) Collected: 12/15/24 1641    Specimen: Blood Updated: 12/15/24 1654    Narrative:      The following orders were created for panel order CBC & Differential.  Procedure                               Abnormality         Status                     ---------                               -----------         ------                     CBC Auto Differential[[id number]]        Abnormal            Final result                 Please view results for these tests on the individual orders.    CBC Auto Differential [[id number]]  (Abnormal) Collected: 12/15/24 1641    Specimen: Blood Updated: 12/15/24 1654     WBC 9.81 10*3/mm3      RBC 4.49 10*6/mm3      Hemoglobin 13.3 g/dL      Hematocrit 40.8 %      MCV 90.9 fL      MCH 29.6 pg      MCHC 32.6 g/dL      RDW 12.8 %      RDW-SD 42.7 fl      MPV 9.7 fL      Platelets 264 10*3/mm3      Neutrophil % 61.1 %      Lymphocyte % 22.1 %      Monocyte % 12.9 %      Eosinophil % 2.8 %      Basophil % 0.8 %      Immature Grans % 0.3 %      Neutrophils, Absolute 5.99 10*3/mm3      Lymphocytes, Absolute 2.17 10*3/mm3      Monocytes, Absolute 1.27 10*3/mm3      Eosinophils, Absolute 0.27 10*3/mm3      Basophils, Absolute 0.08 10*3/mm3      Immature Grans, Absolute 0.03 10*3/mm3       nRBC 0.0 /100 WBC     Comprehensive Metabolic Panel [[id number]]  (Abnormal) Collected: 12/15/24 1707    Specimen: Blood Updated: 12/15/24 1736     Glucose 111 mg/dL      BUN 14 mg/dL      Creatinine 1.08 mg/dL      Sodium 136 mmol/L      Potassium 4.2 mmol/L      Chloride 98 mmol/L      CO2 23.2 mmol/L      Calcium 8.9 mg/dL      Total Protein 7.2 g/dL      Albumin 3.7 g/dL      ALT (SGPT) 13 U/L      AST (SGOT) 16 U/L      Alkaline Phosphatase 85 U/L      Total Bilirubin 0.4 mg/dL      Globulin 3.5 gm/dL      A/G Ratio 1.1 g/dL      BUN/Creatinine Ratio 13.0     Anion Gap 14.8 mmol/L      eGFR 63.9 mL/min/1.73     Narrative:      GFR Categories in Chronic Kidney Disease (CKD)      GFR Category          GFR (mL/min/1.73)    Interpretation  G1                     90 or greater         Normal or high (1)  G2                      60-89                Mild decrease (1)  G3a                   45-59                Mild to moderate decrease  G3b                   30-44                Moderate to severe decrease  G4                    15-29                Severe decrease  G5                    14 or less           Kidney failure          (1)In the absence of evidence of kidney disease, neither GFR category G1 or G2 fulfill the criteria for CKD.    eGFR calculation 2021 CKD-EPI creatinine equation, which does not include race as a factor    Lipase [[id number]]  (Normal) Collected: 12/15/24 1707    Specimen: Blood Updated: 12/15/24 1736     Lipase 21 U/L     Lactic Acid, Plasma [[id number]]  (Normal) Collected: 12/15/24 1707    Specimen: Blood Updated: 12/15/24 1732     Lactate 1.1 mmol/L     Urinalysis With Microscopic If Indicated (No Culture) - Urine, Clean Catch [[id number]]  (Abnormal) Collected: 12/15/24 1818    Specimen: Urine, Clean Catch Updated: 12/15/24 1834     Color, UA Yellow     Appearance, UA Clear     pH, UA 7.0     Specific Gravity, UA >1.030     Glucose, UA Negative     Ketones, UA Negative     Bilirubin,  UA Negative     Blood, UA Trace     Protein, UA Negative     Leuk Esterase, UA Negative     Nitrite, UA Negative     Urobilinogen, UA 0.2 E.U./dL    Urinalysis, Microscopic Only - Urine, Clean Catch [[id number]]  (Abnormal) Collected: 12/15/24 1818    Specimen: Urine, Clean Catch Updated: 12/15/24 1834     RBC, UA 6-10 /HPF      WBC, UA 0-2 /HPF      Bacteria, UA None Seen /HPF      Squamous Epithelial Cells, UA 0-2 /HPF      Hyaline Casts, UA None Seen /LPF      Methodology Automated Microscopy             Imaging:    CT Abdomen Pelvis With Contrast    Result Date: 12/15/2024  CT ABDOMEN PELVIS W CONTRAST Date of Exam: 12/15/2024 5:40 PM EST Indication: abd wall abcess/umbilical hernia/erythema. Comparison: 12/13/2024 Technique: Axial CT images were obtained of the abdomen and pelvis after the uneventful intravenous administration of iodinated contrast. Reconstructed coronal and sagittal images were also obtained. Automated exposure control and iterative construction methods were used. FINDINGS: Lung bases: No masses. No consolidation. Liver: 2.4 cm x 1.5 cm arterially enhancing lesion in the right hepatic lobe with surrounding parenchymal hyperenhancement. Spleen:No masses. No perisplenic hematoma. Pancreas:No pancreatic masses. No evidence of pancreatitis. Gallbladder and common bile duct:No evidence of cholelithiasis. No evidence of cholecystitis. Adrenal glands:No adrenal masses Kidneys and ureters:No kidney stones. No renal masses.No calculi present within the ureters. Normal caliber ureters. Urinary bladder:No urinary bladder wall thickening. No bladder masses. Small bowel:Normal caliber small bowel. Large bowel: Colonic diverticulosis without evidence of diverticulitis. Appendix: Normal GENITOURINARY: Normal appearance of the uterus and adnexa. Ascites or pneumoperitoneum:None. Adenopathy:None present Osseous structures: The proximal femurs are intact. The pubic bones are intact. The bony pelvis is intact.  The sacrum and sacroiliac joints are normal. Other findings: Inflamed fat-containing umbilical hernia.     Inflamed fat-containing umbilical hernia. Electronically Signed: Sarwat Robles MD  12/15/2024 6:00 PM EST  Workstation ID: [id number]       Differential Diagnosis and Discussion:      Abdominal Pain: Based on the patient's signs and symptoms, I considered abdominal aortic aneurysm, small bowel obstruction, pancreatitis, acute cholecystitis, acute appendecitis, peptic ulcer disease, gastritis, colitis, endocrine disorders, irritable bowel syndrome and other differential diagnosis an etiology of the patient's abdominal pain.  Abscess: Differential diagnosis for an abscess includes but is not limited to bacterial or fungal infections, foreign body reactions, malignancies, and autoimmune or inflammatory conditions.    PROCEDURES:    Labs were drawn in the emergency department and all labs were reviewed and interpreted by me.  CT scan was performed in the emergency department and the CT scan radiology impression was interpreted by me.    No orders to display        Procedures    MDM                   Patient Care Considerations:    SEPSIS was considered but is NOT present in the emergency department as SIRS criteria is not present.      Consultants/Shared Management Plan:    SHARED VISIT: I have discussed the case with my supervising physician, Dr. Patel  who stateshe will help with reduction and consult with GS. The substantive portion of the medical decision was made by the attesting physician who made or approve the management plan and will take responsibility for the patient.  Clinical findings were discussed and ultimate disposition was made in consult with supervising physician.  Consultant: I have discussed the case with Dr. Duncan, general surgeon who states patient can follow-up in office tomorrow with Dr. Abdi    Social Determinants of Health:    Patient is independent, reliable, and has access to  care.       Disposition and Care Coordination:    Discharged: I considered escalation of care by admitting this patient to the hospital, however general surgery states patient can follow-up in office    I have explained the patient´s condition, diagnoses and treatment plan based on the information available to me at this time. I have answered questions and addressed any concerns. The patient has a good  understanding of the patient´s diagnosis, condition, and treatment plan as can be expected at this point. The vital signs have been stable. The patient´s condition is stable and appropriate for discharge from the emergency department.      The patient will pursue further outpatient evaluation with the primary care physician or other designated or consulting physician as outlined in the discharge instructions. They are agreeable to this plan of care and follow-up instructions have been explained in detail. The patient has received these instructions in written format and has expressed an understanding of the discharge instructions. The patient is aware that any significant change in condition or worsening of symptoms should prompt an immediate return to this or the closest emergency department or call to 911.  I have explained discharge medications and the need for follow up with the patient/caretakers. This was also printed in the discharge instructions. Patient was discharged with the following medications and follow up:      Medication List        New Prescriptions      docusate sodium 100 MG capsule  Commonly known as: COLACE  Take 1 capsule by mouth 2 (Two) Times a Day.               Where to Get Your Medications        These medications were sent to [facility]/pharmacy #[id number] - [place], [place] - [address] - [phone] [facility] [phone] FX  [address] [place] [address]      Hours: 24-hours Phone: [phone]   docusate sodium 100 MG capsule      Jignesh Doe  [address] [facility] [place]  [address]  [phone]          Devin Abdi MD  [address] [place] [id number]  [phone]    Call   call first thing tomorrow morning       Final diagnoses:   Umbilical hernia without obstruction and without gangrene        ED Disposition       ED Disposition   Discharge    Condition   Stable    Comment   --               This medical record created using voice recognition software.             Reynold Álvarez PA-C  12/15/24 1939       Reynold Álvarez PA-C  12/15/24 2026

## 2024-12-16 ENCOUNTER — OFFICE VISIT (OUTPATIENT)
Dept: SURGERY | Facility: CLINIC | Age: 49
End: 2024-12-16
Payer: MEDICARE

## 2024-12-16 VITALS — RESPIRATION RATE: 16 BRPM | HEIGHT: 62 IN | BODY MASS INDEX: 34.89 KG/M2 | WEIGHT: 189.6 LBS

## 2024-12-16 DIAGNOSIS — K42.9 UMBILICAL HERNIA WITHOUT OBSTRUCTION AND WITHOUT GANGRENE: Primary | ICD-10-CM

## 2024-12-16 PROCEDURE — 99203 OFFICE O/P NEW LOW 30 MIN: CPT | Performed by: SURGERY

## 2024-12-16 PROCEDURE — 1159F MED LIST DOCD IN RCRD: CPT | Performed by: SURGERY

## 2024-12-16 PROCEDURE — 1160F RVW MEDS BY RX/DR IN RCRD: CPT | Performed by: SURGERY

## 2024-12-16 RX ORDER — VENLAFAXINE HYDROCHLORIDE 150 MG/1
300 CAPSULE, EXTENDED RELEASE ORAL DAILY PRN
COMMUNITY

## 2024-12-16 RX ORDER — MIRTAZAPINE 45 MG/1
TABLET, ORALLY DISINTEGRATING ORAL
COMMUNITY
End: 2024-12-16

## 2024-12-16 RX ORDER — SODIUM CHLORIDE 9 MG/ML
40 INJECTION, SOLUTION INTRAVENOUS AS NEEDED
Status: CANCELLED | OUTPATIENT
Start: 2024-12-16

## 2024-12-16 RX ORDER — ONDANSETRON 2 MG/ML
4 INJECTION INTRAMUSCULAR; INTRAVENOUS EVERY 6 HOURS PRN
Status: CANCELLED | OUTPATIENT
Start: 2024-12-16

## 2024-12-16 RX ORDER — HYDROCODONE BITARTRATE AND ACETAMINOPHEN 7.5; 325 MG/1; MG/1
1 TABLET ORAL
COMMUNITY
End: 2024-12-16

## 2024-12-16 RX ORDER — SODIUM CHLORIDE 0.9 % (FLUSH) 0.9 %
10 SYRINGE (ML) INJECTION AS NEEDED
Status: CANCELLED | OUTPATIENT
Start: 2024-12-16

## 2024-12-16 RX ORDER — ALPRAZOLAM 1 MG/1
1 TABLET ORAL
COMMUNITY
End: 2024-12-16

## 2024-12-16 RX ORDER — ARIPIPRAZOLE 15 MG/1
TABLET ORAL
COMMUNITY
End: 2024-12-16

## 2024-12-16 RX ORDER — DIVALPROEX SODIUM 250 MG/1
250 TABLET, DELAYED RELEASE ORAL
COMMUNITY
End: 2024-12-16

## 2024-12-16 RX ORDER — GABAPENTIN 800 MG/1
800 TABLET ORAL 3 TIMES DAILY
COMMUNITY
Start: 2024-08-09 | End: 2024-12-16

## 2024-12-16 RX ORDER — AZITHROMYCIN 250 MG/1
TABLET, FILM COATED ORAL
COMMUNITY
Start: 2024-11-20 | End: 2024-12-16

## 2024-12-16 RX ORDER — SODIUM CHLORIDE 0.9 % (FLUSH) 0.9 %
10 SYRINGE (ML) INJECTION EVERY 12 HOURS SCHEDULED
Status: CANCELLED | OUTPATIENT
Start: 2024-12-16

## 2024-12-16 NOTE — H&P (VIEW-ONLY)
Inpatient History and Physical Surgical Orders    Preadmission Location:   Preadmission Time:  Facility:  Surgery Date:  Surgery Time:  Preadmission Test date:     Chief Complaint  Outpatient History and Physical / Surgical Orders    Primary Care Provider: Jignesh Doe    Referring Provider: Graham Heath MD    Subjective      Patient Name: Velma Andres : 1975    HPI  The patient is a 49-year-old female who presents with a symptomatic umbilical hernia.  She has had periumbilical pain for a couple of weeks and had a CT scan that showed a hernia at the umbilicus containing fat.    Past History:  Medical History: has a past medical history of Anxiety, Cervix cancer, Hernia of abdominal wall, Hyperlipidemia, Kidney stone, Thyroid condition, and Umbilical hernia.   Surgical History: has a past surgical history that includes Leg Surgery and Kidney stone surgery.   Family History: family history is not on file.   Social History: reports that she has been smoking cigarettes. She does not have any smokeless tobacco history on file. She reports that she does not currently use alcohol. She reports that she does not use drugs.  Allergies: Patient has no known allergies.       Current Outpatient Medications:     ALPRAZolam (XANAX) 1 MG tablet, Take 1 tablet by mouth., Disp: , Rfl:     ALPRAZolam (XANAX) 2 MG tablet, TAKE ONE TABLET THREE TIMES DAILY AS NEEDED, Disp: , Rfl:     ARIPiprazole (ABILIFY) 15 MG tablet, TAKE 1 TABLET (15 MG) BY MOUTH DAILY AT BEDTIME, Disp: , Rfl:     azithromycin (ZITHROMAX) 250 MG tablet, , Disp: , Rfl:     busPIRone (BUSPAR) 10 MG tablet, Take 1 tablet by mouth Every 12 (Twelve) Hours., Disp: , Rfl:     cephalexin (KEFLEX) 500 MG capsule, Take 1 capsule by mouth 3 (Three) Times a Day., Disp: 21 capsule, Rfl: 0    cloNIDine (CATAPRES) 0.1 MG tablet, Take 1 tablet by mouth Every 12 (Twelve) Hours., Disp: , Rfl:     cyclobenzaprine (FLEXERIL) 5 MG tablet, Take 1 tablet by mouth 3  "times a day., Disp: , Rfl:     divalproex (DEPAKOTE) 250 MG DR tablet, Take 1 tablet by mouth., Disp: , Rfl:     docusate sodium (COLACE) 100 MG capsule, Take 1 capsule by mouth 2 (Two) Times a Day., Disp: 10 capsule, Rfl: 0    FLUoxetine (PROzac) 20 MG capsule, Take 1 capsule by mouth Daily., Disp: , Rfl:     FLUoxetine (PROzac) 20 MG capsule, Take 1 capsule by mouth Daily., Disp: , Rfl:     gabapentin (NEURONTIN) 600 MG tablet, Take 1 tablet by mouth 2 (Two) Times a Day., Disp: , Rfl:     gabapentin (NEURONTIN) 800 MG tablet, Take 1 tablet by mouth 3 (Three) Times a Day., Disp: , Rfl:     HYDROcodone-acetaminophen (NORCO) 7.5-325 MG per tablet, Take 1 tablet by mouth Every 6 (Six) Hours., Disp: 6 tablet, Rfl: 0    HYDROcodone-acetaminophen (NORCO) 7.5-325 MG per tablet, Take 1 tablet by mouth., Disp: , Rfl:     hydrOXYzine (ATARAX) 50 MG tablet, Take 1 tablet by mouth 2 (Two) Times a Day As Needed., Disp: , Rfl:     levothyroxine (SYNTHROID, LEVOTHROID) 100 MCG tablet, , Disp: , Rfl:     mirtazapine (REMERON SOL-TAB) 45 MG disintegrating tablet, PLACE 1 TABLET (45 MG) ON THE TONGUE AND ALLOW TO DISSOLVE DAILY AT BEDTIME, Disp: , Rfl:     oxyCODONE-acetaminophen (PERCOCET) 5-325 MG per tablet, Take 1 tablet by mouth Every 6 (Six) Hours As Needed for Severe Pain., Disp: 12 tablet, Rfl: 0    oxyCODONE-acetaminophen (PERCOCET) 7.5-325 MG per tablet, Take 1 tablet by mouth Every 6 (Six) Hours As Needed for Severe Pain., Disp: 12 tablet, Rfl: 0    predniSONE (DELTASONE) 50 MG tablet, Take 1 tablet by mouth Daily., Disp: 5 tablet, Rfl: 0    venlafaxine XR (EFFEXOR-XR) 150 MG 24 hr capsule, TAKE 2 CAPSULES BY MOUTH DAILY WITH FOOD, Disp: , Rfl:   No current facility-administered medications for this visit.       Objective   Vital Signs:   Resp 16   Ht 157.5 cm (62.01\")   Wt 86 kg (189 lb 9.5 oz)   BMI 34.67 kg/m²       Physical Exam  Vitals and nursing note reviewed.   Constitutional:       Appearance: Normal " appearance. The patient is well-developed.   Cardiovascular:      Rate and Rhythm: Normal rate and regular rhythm.   Pulmonary:      Effort: Pulmonary effort is normal.      Breath sounds: Normal air entry.   Abdominal:      General: Bowel sounds are normal.      Palpations: Abdomen is soft.  Umbilical hernia noted.  There is a little bit of redness in the overlying skin.     Skin:     General: Skin is warm and dry.   Neurological:      Mental Status: The patient is alert and oriented to person, place, and time.      Motor: Motor function is intact.   Psychiatric:         Mood and Affect: Mood normal.       Result Review :               Assessment and Plan   Diagnoses and all orders for this visit:    1. Umbilical hernia without obstruction and without gangrene (Primary)    We will schedule her for a robotic umbilical hernia repair.  I have described that procedure to her as well as the risk and benefits and she is agreeable to proceeding.    I  Devin Abdi MD  12/16/2024

## 2024-12-16 NOTE — PRE-PROCEDURE INSTRUCTIONS
ATIENT INSTRUCTED TO BE:    - NOTHING TO EAT AFTER MIDNIGHT OR CHEW, EXCEPT CAN HAVE CLEAR LIQUIDS 2 HOURS PRIOR TO SURGERY ARRIVAL TIME , NO MORE THAN 8 OZ. (NOTHING RED)     - TO HOLD ALL VITAMINS, SUPPLEMENTS, NSAIDS FOR ONE WEEK PRIOR TO THEIR SURGICAL PROCEDURE  NA  - DO NOT TAKE ______________________ 7 DAYS PRIOR TO PROCEDURE PER ANESTHESIA RECOMMENDATIONS/INSTRUCTIONS     - INSTRUCTED PT TO USE SURGICAL SOAP 1 TIME THE NIGHT PRIOR TO SURGERY ___________ OR THE AM OF SURGERY _____________   USE THE SOAP FROM NECK TO TOES, AVOID THEIR FACE, HAIR, AND PRIVATE PARTS. IF USE THE SOAP THE NIGHT PRIOR TO SURGERY, CHANGE BED LINENS AND NO PETS IN THE BED.     INSTRUCTED NO LOTIONS, JEWELRY, PIERCINGS,  NAIL POLISH, OR DEODORANT DAY OF SURGERY    - IF DIABETIC, CHECK BLOOD GLUCOSE IF LESS THAN 70 OR HAVING SYMPTOMS CALL THE PREOP AREA FOR INSTRUCTIONS ON AM OF SURGERY ( [phone])    -INSTRUCTED TO TAKE THE FOLLOWING MEDICATIONS THE DAY OF SURGERY WITH SIPS OF WATER:   KEFLEX, BUSPIRONE, CLONIDINE, GABAPENTIN, VENLAFAXINE.  AS NEEDED: PERCOCET, XANAX        - DO NOT BRING ANY MEDICATIONS WITH YOU TO THE HOSPITAL THE DAY OF SURGERY, EXCEPT IF USE INHALERS. BRING INHALERS DAY OF SURGERY       - BRING CPAP OR BIPAP TO THE HOSPITAL ONLY IF YOU ARE SPENDING THE NIGHT    - DO NOT SMOKE OR VAPE 24 HOURS PRIOR TO PROCEDURE PER ANESTHESIA REQUEST     -MAKE SURE YOU HAVE A RIDE HOME OR SOMEONE TO STAY WITH YOU THE DAY OF THE PROCEDURE AFTER YOU GO HOME     - FOLLOW ANY OTHER INSTRUCTIONS GIVEN TO YOU BY YOUR SURGEON'S OFFICE.     - DAY OF SURGERY ________COME TO [facility], FIRST FLOOR. CHECK IN AT THE DESK FOR REGISTRATION/SURGERY    - YOU WILL RECEIVE A PHONE CALL THE DAY PRIOR TO SURGERY BETWEEN 1PM AND 4 PM WITH ARRIVAL TIME, IF YOUR SURGERY IS ON A MONDAY YOU WILL RECEIVE A CALL THE FRIDAY PRIOR TO SURGERY DATE    - BRING CASH OR CREDIT CARD FOR COPAYMENT OF MEDICATIONS AFTER SURGERY IF YOU USE THE  HOSPITAL PHARMACY (MEDS TO BED)    - PREADMISSION TESTING NURSE MARIBEL LÓPEZ [phone] IF HAVE ANY QUESTIONS     -PATIENT PROVIDED THE NUMBER FOR PREOP SURGICAL DEPT IF HAD QUESTIONS AFTER HOURS PRIOR TO SURGERY ( [phone]).  INFORMED PT IF NO ANSWER, LEAVE A MESSAGE AND SOMEONE WILL RETURN THEIR CALL       PATIENT VERBALIZED UNDERSTANDING

## 2024-12-16 NOTE — DISCHARGE INSTRUCTIONS
Your lab work does not show any concerning abnormalities  Your urine was negative for UTI  Your CT scan shows that you have an umbilical hernia that is not incarcerated or strangulated.  I have consulted with general surgery who advised to reduce during your ED visit and have you call Dr. Abdi's office first thing tomorrow morning for an appointment.  Please continue taking your pain medicine.  I have also sent a stool softener to take with your pain medicine to help reduce constipation.

## 2024-12-16 NOTE — ED PROVIDER NOTES
"SHARED VISIT NOTE:    Patient is 49 y.o. year old female that presents to the ED for evaluation of abdominal pain.  States that she has been having abdominal pain and erythema that has been ongoing for the last few days.  Was diagnosed with local hernia on the 11th.  States that it is just not gotten any better.  Presents here for further eval..     Physical Exam  Abdominal:      Tenderness: There is abdominal tenderness.      Comments: There is umbilical hernia noted with induration and erythema surrounding it.  Tenderness to palpation.  The hernia was not able to be reduced completely.         ED Course:    /77   Pulse 90   Temp 99.9 °F (37.7 °C) (Oral)   Resp 16   Ht 157.5 cm (62\")   SpO2 98%   BMI 34.56 kg/m²   Results for orders placed or performed during the hospital encounter of 12/15/24   CBC Auto Differential    Collection Time: 12/15/24  4:41 PM    Specimen: Blood   Result Value Ref Range    WBC 9.81 3.40 - 10.80 10*3/mm3    RBC 4.49 3.77 - 5.28 10*6/mm3    Hemoglobin 13.3 12.0 - 15.9 g/dL    Hematocrit 40.8 34.0 - 46.6 %    MCV 90.9 79.0 - 97.0 fL    MCH 29.6 26.6 - 33.0 pg    MCHC 32.6 31.5 - 35.7 g/dL    RDW 12.8 12.3 - 15.4 %    RDW-SD 42.7 37.0 - 54.0 fl    MPV 9.7 6.0 - 12.0 fL    Platelets 264 140 - 450 10*3/mm3    Neutrophil % 61.1 42.7 - 76.0 %    Lymphocyte % 22.1 19.6 - 45.3 %    Monocyte % 12.9 (H) 5.0 - 12.0 %    Eosinophil % 2.8 0.3 - 6.2 %    Basophil % 0.8 0.0 - 1.5 %    Immature Grans % 0.3 0.0 - 0.5 %    Neutrophils, Absolute 5.99 1.70 - 7.00 10*3/mm3    Lymphocytes, Absolute 2.17 0.70 - 3.10 10*3/mm3    Monocytes, Absolute 1.27 (H) 0.10 - 0.90 10*3/mm3    Eosinophils, Absolute 0.27 0.00 - 0.40 10*3/mm3    Basophils, Absolute 0.08 0.00 - 0.20 10*3/mm3    Immature Grans, Absolute 0.03 0.00 - 0.05 10*3/mm3    nRBC 0.0 0.0 - 0.2 /100 WBC   Green Top (Gel)    Collection Time: 12/15/24  4:41 PM   Result Value Ref Range    Extra Tube Hold for add-ons.    Lavender Top    Collection " Time: 12/15/24  4:41 PM   Result Value Ref Range    Extra Tube hold for add-on    Gold Top - SST    Collection Time: 12/15/24  4:41 PM   Result Value Ref Range    Extra Tube Hold for add-ons.    Light Blue Top    Collection Time: 12/15/24  4:41 PM   Result Value Ref Range    Extra Tube Hold for add-ons.    Comprehensive Metabolic Panel    Collection Time: 12/15/24  5:07 PM    Specimen: Blood   Result Value Ref Range    Glucose 111 (H) 65 - 99 mg/dL    BUN 14 6 - 20 mg/dL    Creatinine 1.08 (H) 0.57 - 1.00 mg/dL    Sodium 136 136 - 145 mmol/L    Potassium 4.2 3.5 - 5.2 mmol/L    Chloride 98 98 - 107 mmol/L    CO2 23.2 22.0 - 29.0 mmol/L    Calcium 8.9 8.6 - 10.5 mg/dL    Total Protein 7.2 6.0 - 8.5 g/dL    Albumin 3.7 3.5 - 5.2 g/dL    ALT (SGPT) 13 1 - 33 U/L    AST (SGOT) 16 1 - 32 U/L    Alkaline Phosphatase 85 39 - 117 U/L    Total Bilirubin 0.4 0.0 - 1.2 mg/dL    Globulin 3.5 gm/dL    A/G Ratio 1.1 g/dL    BUN/Creatinine Ratio 13.0 7.0 - 25.0    Anion Gap 14.8 5.0 - 15.0 mmol/L    eGFR 63.9 >60.0 mL/min/1.73   Lipase    Collection Time: 12/15/24  5:07 PM    Specimen: Blood   Result Value Ref Range    Lipase 21 13 - 60 U/L   Lactic Acid, Plasma    Collection Time: 12/15/24  5:07 PM    Specimen: Blood   Result Value Ref Range    Lactate 1.1 0.5 - 2.0 mmol/L   Urinalysis With Microscopic If Indicated (No Culture) - Urine, Clean Catch    Collection Time: 12/15/24  6:18 PM    Specimen: Urine, Clean Catch   Result Value Ref Range    Color, UA Yellow Yellow, Straw    Appearance, UA Clear Clear    pH, UA 7.0 5.0 - 8.0    Specific Gravity, UA >1.030 (H) 1.005 - 1.030    Glucose, UA Negative Negative    Ketones, UA Negative Negative    Bilirubin, UA Negative Negative    Blood, UA Trace (A) Negative    Protein, UA Negative Negative    Leuk Esterase, UA Negative Negative    Nitrite, UA Negative Negative    Urobilinogen, UA 0.2 E.U./dL 0.2 - 1.0 E.U./dL   Urinalysis, Microscopic Only - Urine, Clean Catch    Collection Time:  12/15/24  6:18 PM    Specimen: Urine, Clean Catch   Result Value Ref Range    RBC, UA 6-10 (A) None Seen, 0-2 /HPF    WBC, UA 0-2 None Seen, 0-2 /HPF    Bacteria, UA None Seen None Seen /HPF    Squamous Epithelial Cells, UA 0-2 None Seen, 0-2 /HPF    Hyaline Casts, UA None Seen None Seen /LPF    Methodology Automated Microscopy      Medications   sodium chloride 0.9 % flush 10 mL (has no administration in time range)   morphine injection 4 mg (has no administration in time range)   morphine injection 4 mg (4 mg Intravenous Given 12/15/24 1735)   ondansetron (ZOFRAN) injection 4 mg (4 mg Intravenous Given 12/15/24 1735)   iopamidol (ISOVUE-370) 76 % injection 100 mL (100 mL Intravenous Given 12/15/24 1754)   HYDROmorphone PF (DILAUDID) injection 1 mg (1 mg Intravenous Given 12/15/24 1851)   diazePAM (VALIUM) injection 5 mg (5 mg Intravenous Given 12/15/24 1850)     CT Abdomen Pelvis With Contrast    Result Date: 12/15/2024  Narrative: CT ABDOMEN PELVIS W CONTRAST Date of Exam: 12/15/2024 5:40 PM EST Indication: abd wall abcess/umbilical hernia/erythema. Comparison: 12/13/2024 Technique: Axial CT images were obtained of the abdomen and pelvis after the uneventful intravenous administration of iodinated contrast. Reconstructed coronal and sagittal images were also obtained. Automated exposure control and iterative construction methods were used. FINDINGS: Lung bases: No masses. No consolidation. Liver: 2.4 cm x 1.5 cm arterially enhancing lesion in the right hepatic lobe with surrounding parenchymal hyperenhancement. Spleen:No masses. No perisplenic hematoma. Pancreas:No pancreatic masses. No evidence of pancreatitis. Gallbladder and common bile duct:No evidence of cholelithiasis. No evidence of cholecystitis. Adrenal glands:No adrenal masses Kidneys and ureters:No kidney stones. No renal masses.No calculi present within the ureters. Normal caliber ureters. Urinary bladder:No urinary bladder wall thickening. No bladder  masses. Small bowel:Normal caliber small bowel. Large bowel: Colonic diverticulosis without evidence of diverticulitis. Appendix: Normal GENITOURINARY: Normal appearance of the uterus and adnexa. Ascites or pneumoperitoneum:None. Adenopathy:None present Osseous structures: The proximal femurs are intact. The pubic bones are intact. The bony pelvis is intact. The sacrum and sacroiliac joints are normal. Other findings: Inflamed fat-containing umbilical hernia.     Impression: Inflamed fat-containing umbilical hernia. Electronically Signed: Sarwat Robles MD  12/15/2024 6:00 PM EST  Workstation ID: [id number]    CT Abdomen Pelvis With Contrast    Result Date: 12/13/2024  Narrative: CT ABDOMEN PELVIS W CONTRAST Date of Exam: 12/13/2024 12:02 PM EST Indication: Umbilical hernia worsening pain, firm with overlying erythema. Comparison: 12/11/2024. Technique: Axial CT images were obtained of the abdomen and pelvis after the uneventful intravenous administration of iodinated contrast. Reconstructed coronal and sagittal images were also obtained. Automated exposure control and iterative construction methods were used. Findings: There is an ovoid shaped enhancing lesion in the right lobe of the liver beneath the right hemidiaphragm felt to represent a benign cavernous hemangioma measuring 2.1 x 1.4 cm. The remaining liver parenchyma is normal. The gallbladder, pancreas, adrenal glands, spleen, and both kidneys are unremarkable. The uterus, adnexal structures, and urinary bladder are normal. The appendix is normal. There are no dilated loops of bowel to indicate an obstructive process. There is no abnormal bowel wall thickening.  There is mild increased stool burden. The patient has a fat-containing hernia above the umbilicus. The abdominal wall defect measures 2.2 cm in diameter. There are no enlarged lymph nodes or abnormal fluid collections. There are a few scattered atherosclerotic vascular calcifications. There is normal  vascular enhancement. There is minor scarring in the lung bases. There are no suspicious osteolytic or sclerotic lesions within the bony structures.     Impression: Impression: 1.There is a fat-containing hernia above the umbilicus. The abdominal wall defect measures 2.2 cm in diameter. 2.Benign cavernous hemangioma in the right lobe of the liver. 3.Mild increased stool burden. Electronically Signed: Tavo Lackey MD  12/13/2024 12:34 PM EST  Workstation ID: [id number]    CT Abdomen Pelvis With Contrast    Result Date: 12/11/2024  Narrative: CT ABDOMEN PELVIS W CONTRAST, CT ANGIOGRAM CHEST PULMONARY EMBOLISM Date of Exam: 12/11/2024 5:41 PM EST Indication: Abdominal pain abdominal pain. Comparison: None available. Technique: Axial CT images were obtained of the abdomen and pelvis after the uneventful intravenous administration of iodinated contrast. Reconstructed coronal and sagittal images were also obtained. Automated exposure control and iterative construction methods were used. FINDINGS: Lung bases: No masses. No consolidation. Liver:No masses. No intrahepatic biliary ductal dilatation. Spleen:No masses. No perisplenic hematoma. Pancreas:No pancreatic masses. No evidence of pancreatitis. Gallbladder and common bile duct:No evidence of cholelithiasis. No evidence of cholecystitis. Adrenal glands:No adrenal masses Kidneys and ureters: 0.6 cm right renal cyst. No calculi present within the ureters. Normal caliber ureters. Urinary bladder:No urinary bladder wall thickening. No bladder masses. Small bowel:Normal caliber small bowel. Large bowel:No diverticulosis or diverticulitis. No large bowel masses are appreciated Appendix: Normal GENITOURINARY: Normal appearance of the uterus and adnexa. Ascites or pneumoperitoneum:None. Adenopathy:None present Osseous structures: The proximal femurs are intact. The pubic bones are intact. The sacrum and sacroiliac joints are normal. Degenerative changes of the lumbar spine. No rib  fractures. Other findings: Fat-containing umbilical hernia.     Impression: 1.No acute findings in the abdomen or pelvis. 2.No evidence of pulmonary embolism. 3.Fat-containing umbilical hernia. Electronically Signed: Sarwat Robles MD  12/11/2024 6:02 PM EST  Workstation ID: [id number]    CT Angiogram Chest Pulmonary Embolism    Result Date: 12/11/2024  Narrative: CT ABDOMEN PELVIS W CONTRAST, CT ANGIOGRAM CHEST PULMONARY EMBOLISM Date of Exam: 12/11/2024 5:41 PM EST Indication: Abdominal pain abdominal pain. Comparison: None available. Technique: Axial CT images were obtained of the abdomen and pelvis after the uneventful intravenous administration of iodinated contrast. Reconstructed coronal and sagittal images were also obtained. Automated exposure control and iterative construction methods were used. FINDINGS: Lung bases: No masses. No consolidation. Liver:No masses. No intrahepatic biliary ductal dilatation. Spleen:No masses. No perisplenic hematoma. Pancreas:No pancreatic masses. No evidence of pancreatitis. Gallbladder and common bile duct:No evidence of cholelithiasis. No evidence of cholecystitis. Adrenal glands:No adrenal masses Kidneys and ureters: 0.6 cm right renal cyst. No calculi present within the ureters. Normal caliber ureters. Urinary bladder:No urinary bladder wall thickening. No bladder masses. Small bowel:Normal caliber small bowel. Large bowel:No diverticulosis or diverticulitis. No large bowel masses are appreciated Appendix: Normal GENITOURINARY: Normal appearance of the uterus and adnexa. Ascites or pneumoperitoneum:None. Adenopathy:None present Osseous structures: The proximal femurs are intact. The pubic bones are intact. The sacrum and sacroiliac joints are normal. Degenerative changes of the lumbar spine. No rib fractures. Other findings: Fat-containing umbilical hernia.     Impression: 1.No acute findings in the abdomen or pelvis. 2.No evidence of pulmonary embolism. 3.Fat-containing  umbilical hernia. Electronically Signed: Sarwat Robles MD  12/11/2024 6:02 PM EST  Workstation ID: [id number]   \  MDM: Patient with a fat-containing umbilical hernia.  Did have some improvement after attempted reduction of the hernia.  The redness has improved.  Did speak with general surgery who recommends seeing them tomorrow afternoon.  She does appear to be slightly improved.  I was not able to get the hernia completely reduced but she is to see general surgery tomorrow.  This is also fat-containing and does not contain any bowel.  She is otherwise well-appearing at this time.  Will DC and have patient follow-up as an outpatient.    Procedures    Labs were drawn in the emergency department and all labs were reviewed and interpreted by me.  CT scan was performed in the emergency department and the CT scan radiology impression was interpreted by me.          SHARED VISIT ATTESTATION:    This visit was performed by both myself and an APC.  I performed the substantive portion of the medical decision making. The management plan was made or approved by me, and I take responsibility for patient management.           Nicola Patel MD  20:25 EST  12/15/24         Nicola Patel MD  12/15/24 2044

## 2024-12-16 NOTE — ED NOTES
All follow up care discussed. Pt verbalized understandings. PIV removed and bleeding controlled. Stable and ambulatory to lobby.

## 2024-12-17 ENCOUNTER — TELEPHONE (OUTPATIENT)
Dept: SURGERY | Facility: CLINIC | Age: 49
End: 2024-12-17
Payer: COMMERCIAL

## 2024-12-17 NOTE — TELEPHONE ENCOUNTER
MARIE CALLED FROM [facility].  PATIENT IS SCHEDULED FOR SURGERY 12/19/24 WITH DR. ALEGRIA.    SHE SAID ALL  AND [facility] ARE OUT OF NETWORK WITH THE PATIENT'S [facility] HMO.  THIS IS THE PATIENT'S PRIMARY INSURANCE.  SHE WILL NOT HAVE ANY ACTIVE COVERAGE FOR SURGERY WITH DR. ALEGRIA, OR FOR ANY OFFICE VISITS.    SHE WILL NEED TO BE RESCHEDULED TO A PARTICIPATING FACILITY, WITH A PARTICIPATING PROVIDER.    SHE IS GETTING READY TO CALL THE PATIENT TO MAKE HER AWARE.    #[phone]

## 2024-12-17 NOTE — TELEPHONE ENCOUNTER
PT SAID THAT SHE GOT THE INSURANCE ISSUE STRAIGHTENED OUT AND PASSPORT IS HER PRIMARY. SHE DOES NOT WANT THE SURGERY CANCELLED.

## 2024-12-18 NOTE — TELEPHONE ENCOUNTER
PATIENT CALLED EARLIER.  SHE WANTED TO KNOW HER ARRIVAL TIME FOR SURGERY 12/19/24.  I TOLD HER THE HOSPITAL CALLS WITH THE ARRIVAL TIME.      SHE DID NOT GET SURGICAL SOAP, AND ASKED IF HER DAUGHTER COULD COME AND GET IT.  I TOLD HER SHE CAN, AND THAT THE OFFICE CLOSES AT 4:30.    AFTER READING PREVIOUS ENTRY, I TOLD THE PATIENT I WANTED TO CHECK ON HER INSURANCE, AND WOULD CALL HER BACK.    SHE AND HER DAUGHTER CALLED BACK.  PATIENT SAID SHE SPOKE TO SOMEONE YESTERDAY.  SHE SAID SHE CHANGED HER PASSPORT BY MARISELA TO HER PRIMARY INSURANCE, AND SWITCHED THE [facility] HMO AS SECOND.  SHE SAID SHE WAS TOLD SHE WAS GOOD TO GO.      HER DAUGHTER TRISHA WAS ON THE PHONE, AND SAID SHE CALLED THE OFFICE AND SPOKE TO SOMEONE YESTERDAY, AND EVERYTHING WAS GOOD.    THEY WANT TO KNOW IF SHE IS GOOD FOR SURGERY TOMORROW.  PATIENT SAID SHE WAS TIRED OF TALKING TO US ABOUT IT AND THINKS SHE NEEDS TO SPEAK TO SOMEONE IN CHARGE.    I TOLD HER, EITHER I OR SOMEONE WOULD CALL HER BACK.    #[phone]

## 2024-12-19 ENCOUNTER — ANESTHESIA (OUTPATIENT)
Dept: PERIOP | Facility: HOSPITAL | Age: 49
End: 2024-12-19
Payer: COMMERCIAL

## 2024-12-19 ENCOUNTER — HOSPITAL ENCOUNTER (OUTPATIENT)
Facility: HOSPITAL | Age: 49
Setting detail: HOSPITAL OUTPATIENT SURGERY
Discharge: HOME | End: 2024-12-19
Attending: SURGERY | Admitting: SURGERY
Payer: COMMERCIAL

## 2024-12-19 ENCOUNTER — ANESTHESIA EVENT (OUTPATIENT)
Dept: PERIOP | Facility: HOSPITAL | Age: 49
End: 2024-12-19
Payer: COMMERCIAL

## 2024-12-19 VITALS
TEMPERATURE: 98 F | SYSTOLIC BLOOD PRESSURE: 130 MMHG | BODY MASS INDEX: 31.37 KG/M2 | DIASTOLIC BLOOD PRESSURE: 76 MMHG | HEART RATE: 88 BPM | RESPIRATION RATE: 17 BRPM | OXYGEN SATURATION: 96 % | HEIGHT: 63 IN | WEIGHT: 177.03 LBS

## 2024-12-19 DIAGNOSIS — K42.9 UMBILICAL HERNIA WITHOUT OBSTRUCTION AND WITHOUT GANGRENE: ICD-10-CM

## 2024-12-19 PROCEDURE — 25010000002 HYDROMORPHONE 1 MG/ML SOLUTION: Performed by: NURSE ANESTHETIST, CERTIFIED REGISTERED

## 2024-12-19 PROCEDURE — 25010000002 CEFAZOLIN PER 500 MG: Performed by: SURGERY

## 2024-12-19 PROCEDURE — 25010000002 DIAZEPAM PER 5 MG: Performed by: ANESTHESIOLOGY

## 2024-12-19 PROCEDURE — 25010000002 ONDANSETRON PER 1 MG: Performed by: NURSE ANESTHETIST, CERTIFIED REGISTERED

## 2024-12-19 PROCEDURE — 25010000002 MEPERIDINE PER 100 MG: Performed by: NURSE ANESTHETIST, CERTIFIED REGISTERED

## 2024-12-19 PROCEDURE — 25010000002 FENTANYL CITRATE (PF) 50 MCG/ML SOLUTION: Performed by: NURSE ANESTHETIST, CERTIFIED REGISTERED

## 2024-12-19 PROCEDURE — 25010000002 MIDAZOLAM PER 1MG: Performed by: ANESTHESIOLOGY

## 2024-12-19 PROCEDURE — 88302 TISSUE EXAM BY PATHOLOGIST: CPT | Performed by: SURGERY

## 2024-12-19 PROCEDURE — 25010000002 BUPIVACAINE (PF) 0.25 % SOLUTION: Performed by: SURGERY

## 2024-12-19 PROCEDURE — 25810000003 LACTATED RINGERS PER 1000 ML: Performed by: ANESTHESIOLOGY

## 2024-12-19 PROCEDURE — 25010000002 SUGAMMADEX 200 MG/2ML SOLUTION: Performed by: NURSE ANESTHETIST, CERTIFIED REGISTERED

## 2024-12-19 PROCEDURE — 25010000002 PROPOFOL 10 MG/ML EMULSION: Performed by: NURSE ANESTHETIST, CERTIFIED REGISTERED

## 2024-12-19 PROCEDURE — 49594 RPR AA HRN 1ST 3-10 NCR/STRN: CPT | Performed by: SURGERY

## 2024-12-19 PROCEDURE — C1781 MESH (IMPLANTABLE): HCPCS | Performed by: SURGERY

## 2024-12-19 PROCEDURE — 25010000002 LIDOCAINE PF 2% 2 % SOLUTION: Performed by: NURSE ANESTHETIST, CERTIFIED REGISTERED

## 2024-12-19 PROCEDURE — 25010000002 DEXAMETHASONE PER 1 MG: Performed by: NURSE ANESTHETIST, CERTIFIED REGISTERED

## 2024-12-19 PROCEDURE — 25010000002 KETOROLAC TROMETHAMINE PER 15 MG: Performed by: ANESTHESIOLOGY

## 2024-12-19 DEVICE — ABSORBABLE WOUND CLOSURE DEVICE
Type: IMPLANTABLE DEVICE | Site: ABDOMEN | Status: FUNCTIONAL
Brand: V-LOC 180

## 2024-12-19 DEVICE — IMPLANTABLE DEVICE: Type: IMPLANTABLE DEVICE | Site: ABDOMEN | Status: FUNCTIONAL

## 2024-12-19 RX ORDER — PROPOFOL 10 MG/ML
VIAL (ML) INTRAVENOUS AS NEEDED
Status: DISCONTINUED | OUTPATIENT
Start: 2024-12-19 | End: 2024-12-19 | Stop reason: SURG

## 2024-12-19 RX ORDER — LIDOCAINE HYDROCHLORIDE 20 MG/ML
INJECTION, SOLUTION EPIDURAL; INFILTRATION; INTRACAUDAL; PERINEURAL AS NEEDED
Status: DISCONTINUED | OUTPATIENT
Start: 2024-12-19 | End: 2024-12-19 | Stop reason: SURG

## 2024-12-19 RX ORDER — ACETAMINOPHEN 500 MG
1000 TABLET ORAL ONCE
Status: COMPLETED | OUTPATIENT
Start: 2024-12-19 | End: 2024-12-19

## 2024-12-19 RX ORDER — PHENYLEPHRINE HCL IN 0.9% NACL 1 MG/10 ML
SYRINGE (ML) INTRAVENOUS AS NEEDED
Status: DISCONTINUED | OUTPATIENT
Start: 2024-12-19 | End: 2024-12-19 | Stop reason: SURG

## 2024-12-19 RX ORDER — SODIUM CHLORIDE 0.9 % (FLUSH) 0.9 %
10 SYRINGE (ML) INJECTION AS NEEDED
Status: DISCONTINUED | OUTPATIENT
Start: 2024-12-19 | End: 2024-12-19 | Stop reason: HOSPADM

## 2024-12-19 RX ORDER — IBUPROFEN 600 MG/1
600 TABLET, FILM COATED ORAL EVERY 6 HOURS PRN
Status: DISCONTINUED | OUTPATIENT
Start: 2024-12-19 | End: 2024-12-19 | Stop reason: HOSPADM

## 2024-12-19 RX ORDER — PROMETHAZINE HYDROCHLORIDE 12.5 MG/1
25 TABLET ORAL ONCE AS NEEDED
Status: DISCONTINUED | OUTPATIENT
Start: 2024-12-19 | End: 2024-12-19 | Stop reason: HOSPADM

## 2024-12-19 RX ORDER — HYDROCODONE BITARTRATE AND ACETAMINOPHEN 5; 325 MG/1; MG/1
1 TABLET ORAL EVERY 6 HOURS PRN
Qty: 10 TABLET | Refills: 0 | Status: SHIPPED | OUTPATIENT
Start: 2024-12-19

## 2024-12-19 RX ORDER — DIAZEPAM 10 MG/2ML
2.5 INJECTION, SOLUTION INTRAMUSCULAR; INTRAVENOUS ONCE
Status: COMPLETED | OUTPATIENT
Start: 2024-12-19 | End: 2024-12-19

## 2024-12-19 RX ORDER — SODIUM CHLORIDE, SODIUM LACTATE, POTASSIUM CHLORIDE, CALCIUM CHLORIDE 600; 310; 30; 20 MG/100ML; MG/100ML; MG/100ML; MG/100ML
9 INJECTION, SOLUTION INTRAVENOUS CONTINUOUS PRN
Status: DISCONTINUED | OUTPATIENT
Start: 2024-12-19 | End: 2024-12-19 | Stop reason: HOSPADM

## 2024-12-19 RX ORDER — DEXMEDETOMIDINE HYDROCHLORIDE 100 UG/ML
INJECTION, SOLUTION INTRAVENOUS AS NEEDED
Status: DISCONTINUED | OUTPATIENT
Start: 2024-12-19 | End: 2024-12-19 | Stop reason: SURG

## 2024-12-19 RX ORDER — MEPERIDINE HYDROCHLORIDE 25 MG/ML
12.5 INJECTION INTRAMUSCULAR; INTRAVENOUS; SUBCUTANEOUS
Status: DISCONTINUED | OUTPATIENT
Start: 2024-12-19 | End: 2024-12-19 | Stop reason: HOSPADM

## 2024-12-19 RX ORDER — SODIUM CHLORIDE 0.9 % (FLUSH) 0.9 %
10 SYRINGE (ML) INJECTION EVERY 12 HOURS SCHEDULED
Status: DISCONTINUED | OUTPATIENT
Start: 2024-12-19 | End: 2024-12-19 | Stop reason: HOSPADM

## 2024-12-19 RX ORDER — BUPIVACAINE HYDROCHLORIDE 2.5 MG/ML
INJECTION, SOLUTION EPIDURAL; INFILTRATION; INTRACAUDAL AS NEEDED
Status: DISCONTINUED | OUTPATIENT
Start: 2024-12-19 | End: 2024-12-19 | Stop reason: HOSPADM

## 2024-12-19 RX ORDER — ONDANSETRON 2 MG/ML
4 INJECTION INTRAMUSCULAR; INTRAVENOUS ONCE AS NEEDED
Status: DISCONTINUED | OUTPATIENT
Start: 2024-12-19 | End: 2024-12-19 | Stop reason: HOSPADM

## 2024-12-19 RX ORDER — HYDROCODONE BITARTRATE AND ACETAMINOPHEN 5; 325 MG/1; MG/1
1 TABLET ORAL ONCE AS NEEDED
Status: DISCONTINUED | OUTPATIENT
Start: 2024-12-19 | End: 2024-12-19 | Stop reason: HOSPADM

## 2024-12-19 RX ORDER — SODIUM CHLORIDE 9 MG/ML
40 INJECTION, SOLUTION INTRAVENOUS AS NEEDED
Status: DISCONTINUED | OUTPATIENT
Start: 2024-12-19 | End: 2024-12-19 | Stop reason: HOSPADM

## 2024-12-19 RX ORDER — ONDANSETRON 4 MG/1
4 TABLET, ORALLY DISINTEGRATING ORAL ONCE AS NEEDED
Status: DISCONTINUED | OUTPATIENT
Start: 2024-12-19 | End: 2024-12-19 | Stop reason: HOSPADM

## 2024-12-19 RX ORDER — MIDAZOLAM HYDROCHLORIDE 2 MG/2ML
2 INJECTION, SOLUTION INTRAMUSCULAR; INTRAVENOUS ONCE
Status: COMPLETED | OUTPATIENT
Start: 2024-12-19 | End: 2024-12-19

## 2024-12-19 RX ORDER — KETOROLAC TROMETHAMINE 15 MG/ML
30 INJECTION, SOLUTION INTRAMUSCULAR; INTRAVENOUS EVERY 6 HOURS PRN
Status: DISCONTINUED | OUTPATIENT
Start: 2024-12-19 | End: 2024-12-19 | Stop reason: HOSPADM

## 2024-12-19 RX ORDER — DEXAMETHASONE SODIUM PHOSPHATE 4 MG/ML
INJECTION, SOLUTION INTRA-ARTICULAR; INTRALESIONAL; INTRAMUSCULAR; INTRAVENOUS; SOFT TISSUE AS NEEDED
Status: DISCONTINUED | OUTPATIENT
Start: 2024-12-19 | End: 2024-12-19 | Stop reason: SURG

## 2024-12-19 RX ORDER — MAGNESIUM HYDROXIDE 1200 MG/15ML
LIQUID ORAL AS NEEDED
Status: DISCONTINUED | OUTPATIENT
Start: 2024-12-19 | End: 2024-12-19 | Stop reason: HOSPADM

## 2024-12-19 RX ORDER — ROCURONIUM BROMIDE 10 MG/ML
INJECTION, SOLUTION INTRAVENOUS AS NEEDED
Status: DISCONTINUED | OUTPATIENT
Start: 2024-12-19 | End: 2024-12-19 | Stop reason: SURG

## 2024-12-19 RX ORDER — CEPHALEXIN 500 MG/1
500 CAPSULE ORAL 4 TIMES DAILY
Qty: 40 CAPSULE | Refills: 0 | Status: SHIPPED | OUTPATIENT
Start: 2024-12-19 | End: 2024-12-29

## 2024-12-19 RX ORDER — FENTANYL CITRATE 50 UG/ML
INJECTION, SOLUTION INTRAMUSCULAR; INTRAVENOUS AS NEEDED
Status: DISCONTINUED | OUTPATIENT
Start: 2024-12-19 | End: 2024-12-19 | Stop reason: SURG

## 2024-12-19 RX ORDER — PROMETHAZINE HYDROCHLORIDE 25 MG/1
25 SUPPOSITORY RECTAL ONCE AS NEEDED
Status: DISCONTINUED | OUTPATIENT
Start: 2024-12-19 | End: 2024-12-19 | Stop reason: HOSPADM

## 2024-12-19 RX ORDER — OXYCODONE HYDROCHLORIDE 5 MG/1
5 TABLET ORAL
Status: DISCONTINUED | OUTPATIENT
Start: 2024-12-19 | End: 2024-12-19 | Stop reason: HOSPADM

## 2024-12-19 RX ORDER — ONDANSETRON 2 MG/ML
INJECTION INTRAMUSCULAR; INTRAVENOUS AS NEEDED
Status: DISCONTINUED | OUTPATIENT
Start: 2024-12-19 | End: 2024-12-19 | Stop reason: SURG

## 2024-12-19 RX ADMIN — ROCURONIUM BROMIDE 20 MG: 50 INJECTION INTRAVENOUS at 14:47

## 2024-12-19 RX ADMIN — PROPOFOL 170 MG: 10 INJECTION, EMULSION INTRAVENOUS at 14:07

## 2024-12-19 RX ADMIN — LIDOCAINE HYDROCHLORIDE 60 MG: 20 INJECTION, SOLUTION INTRAVENOUS at 14:07

## 2024-12-19 RX ADMIN — FENTANYL CITRATE 100 MCG: 50 INJECTION, SOLUTION INTRAMUSCULAR; INTRAVENOUS at 14:07

## 2024-12-19 RX ADMIN — SODIUM CHLORIDE 2000 MG: 9 INJECTION, SOLUTION INTRAVENOUS at 14:04

## 2024-12-19 RX ADMIN — ROCURONIUM BROMIDE 50 MG: 50 INJECTION INTRAVENOUS at 14:07

## 2024-12-19 RX ADMIN — Medication 100 MCG: at 14:54

## 2024-12-19 RX ADMIN — DEXMEDETOMIDINE HYDROCHLORIDE 40 MCG: 100 INJECTION, SOLUTION, CONCENTRATE INTRAVENOUS at 14:16

## 2024-12-19 RX ADMIN — HYDROMORPHONE HYDROCHLORIDE 0.5 MG: 1 INJECTION, SOLUTION INTRAMUSCULAR; INTRAVENOUS; SUBCUTANEOUS at 14:48

## 2024-12-19 RX ADMIN — KETOROLAC TROMETHAMINE 30 MG: 15 INJECTION, SOLUTION INTRAMUSCULAR; INTRAVENOUS at 16:21

## 2024-12-19 RX ADMIN — SUGAMMADEX 140 MG: 100 INJECTION, SOLUTION INTRAVENOUS at 15:15

## 2024-12-19 RX ADMIN — MEPERIDINE HYDROCHLORIDE 12.5 MG: 25 INJECTION INTRAMUSCULAR; INTRAVENOUS; SUBCUTANEOUS at 15:35

## 2024-12-19 RX ADMIN — SODIUM CHLORIDE, POTASSIUM CHLORIDE, SODIUM LACTATE AND CALCIUM CHLORIDE 9 ML/HR: 600; 310; 30; 20 INJECTION, SOLUTION INTRAVENOUS at 12:50

## 2024-12-19 RX ADMIN — DIAZEPAM 2.5 MG: 5 INJECTION, SOLUTION INTRAMUSCULAR; INTRAVENOUS at 16:25

## 2024-12-19 RX ADMIN — HYDROMORPHONE HYDROCHLORIDE 0.5 MG: 1 INJECTION, SOLUTION INTRAMUSCULAR; INTRAVENOUS; SUBCUTANEOUS at 15:44

## 2024-12-19 RX ADMIN — HYDROMORPHONE HYDROCHLORIDE 0.5 MG: 1 INJECTION, SOLUTION INTRAMUSCULAR; INTRAVENOUS; SUBCUTANEOUS at 15:13

## 2024-12-19 RX ADMIN — ACETAMINOPHEN 1000 MG: 500 TABLET ORAL at 12:17

## 2024-12-19 RX ADMIN — OXYCODONE HYDROCHLORIDE 5 MG: 5 TABLET ORAL at 15:44

## 2024-12-19 RX ADMIN — ONDANSETRON 4 MG: 2 INJECTION INTRAMUSCULAR; INTRAVENOUS at 14:16

## 2024-12-19 RX ADMIN — DEXAMETHASONE SODIUM PHOSPHATE 4 MG: 4 INJECTION, SOLUTION INTRAMUSCULAR; INTRAVENOUS at 14:16

## 2024-12-19 RX ADMIN — MIDAZOLAM HYDROCHLORIDE 2 MG: 1 INJECTION, SOLUTION INTRAMUSCULAR; INTRAVENOUS at 13:43

## 2024-12-19 NOTE — OP NOTE
VENTRAL / INCISIONAL HERNIA REPAIR LAPAROSCOPIC WITH DAVINCI ROBOT  Procedure Report    Patient Name:  Velma Andres  YOB: 1975    Date of Surgery:  12/19/2024     Indications: The patient is a 49-year-old female that presented with an incarcerated umbilical hernia.  The decision was made to proceed with a robotic repair of this incarcerated umbilical hernia.    Pre-op Diagnosis: Incarcerated umbilical hernia    Post-Op Diagnosis: Same    Procedure/CPT® Codes:    Robotic umbilical hernia repair    Staff:  Surgeon(s):  Devin Abdi MD    Assistant: Genevieve Blanco CRNFA    Anesthesia: General    Estimated Blood Loss: minimal    Implants:    Implant Name Type Inv. Item Serial No.  Lot No. LRB No. Used Action   DEV CLS WND VLOC/180 RICKY ABS 1/2CIR SZ2/0 23CM 37MM GRN - [id number] Implant DEV CLS WND VLOC/180 RICKY ABS 1/2CIR SZ2/0 23CM 37MM GRN  COVIDIEN [id number] N/A 1 Implanted   DEV CLS WND VLOC/180 RICKY ABS 1/2CIR SZ0 23CM 37MM GRN - [id number] Implant DEV CLS WND VLOC/180 RICKY ABS 1/2CIR SZ0 23CM 37MM GRN  COVIDIEN [id number] N/A 1 Implanted   MESH BUZZ PHASIX/ST W/ECHO2/POSTN/SYS ELLIPSE [id number] - [id number] Implant MESH BUZZ PHASIX/ST W/ECHO2/POSTN/SYS ELLIPSE [id number]  BARD PERIPHERAL VASCULAR [id number] N/A 1 Implanted       Specimen:          Specimens       ID Source Type Tests Collected By Collected At Frozen?    A Hernia, Sac Tissue TISSUE PATHOLOGY EXAM   Devin Abdi MD 12/19/24 1558     Description: Hernia sac                Findings: 3 cm umbilical hernia with chronically incarcerated necrotic omentum    Complications: None    Description of Procedure: The patient was taken to the operating room and placed on the table in supine position.  After induction of general anesthesia her abdomen was prepped and draped sterilely.  Her abdomen was insufflated with a Veress needle and a 12 mm left upper quadrant port was placed in the peritoneal cavity.  Three 8 mm da Stew  robotic trocars were placed along the left side of the abdomen under direct vision.  The da Stew robot was then brought to the table and the robotic arms were docked to the trocars.  The camera and instruments were then placed in the abdomen.  She was noted to have a hernia at the umbilicus with chronically incarcerated omentum.  With hand overhand retraction we were able to eventually reduce all of the fat out of the hernia.  A fair amount of this looked actually necrotic and I went ahead and excised that using a vessel sealer.  I then went ahead and closed the fascial defect with a running 0 absorbable V-Loc suture.  A 10 x 15 cm piece of phasic's mesh was then placed in the peritoneal cavity and pulled up to the anterior abdominal wall below the hernia defect.  It was then sewn in place with running 2-0 absorbable V-Loc sutures.  Once her sutures were placed we had secure fixation of the mesh to the abdominal wall and good coverage from the margins of the hernia defect.  The necrotic fat was placed in an Endopouch bag.  We then removed the robotic instruments from the abdomen and undocked the robotic arms from the trocars.  The necrotic fat was removed from the abdomen and then the 12 mm port site was closed with a Tremaine-Fernie closure device and a 0 Mersilene tie.  After desufflated the abdomen the port site incisions were closed with interrupted 4-0 Vicryl subcuticular sutures.  The hernia sac was very inflamed and hard and I made a transverse incision overlying the hernia sac and I cut down through the subcutaneous tissues and actually excised the hernia sac with cautery.  That skin incision was then closed with interrupted 3-0 nylon sutures.  Sterile dressings were applied and she was taken the postanesthesia recovery room in stable condition.    Assistant: Genevieve Blanco CRNFA  was responsible forominal  performing the following activities: Retraction, Suction, Suturing, Placing Dressing, and  Held/Positioned Camera and their skilled assistance was necessary for the success of this case.    Devin Abdi MD     Date: 12/19/2024  Time: 15:15 EST

## 2024-12-19 NOTE — ANESTHESIA POSTPROCEDURE EVALUATION
Patient: Velma Andres    Procedure Summary       Date: 12/19/24 Room / Location: [facility]  /  [facility]    Anesthesia Start: 1359 Anesthesia Stop: 1533    Procedure: Robotic umbilical hernia repair-repair hernia at umbilicus with small incisions, camera and robotic instruments (Abdomen) Diagnosis:       Umbilical hernia without obstruction and without gangrene      (Umbilical hernia without obstruction and without gangrene [K42.9])    Surgeons: Devin Abdi MD Provider: Marylu Shannon MD    Anesthesia Type: general ASA Status: 2            Anesthesia Type: general    Vitals  Vitals Value Taken Time   /94 12/19/24 1551   Temp 36.4 °C (97.6 °F) 12/19/24 1530   Pulse 77 12/19/24 1557   Resp 17 12/19/24 1530   SpO2 93 % 12/19/24 1557   Vitals shown include unfiled device data.        Post Anesthesia Care and Evaluation    Patient location during evaluation: bedside  Patient participation: complete - patient participated  Level of consciousness: awake  Pain management: adequate    Airway patency: patent  PONV Status: none  Cardiovascular status: acceptable and stable  Respiratory status: acceptable  Hydration status: acceptable

## 2024-12-19 NOTE — ANESTHESIA PREPROCEDURE EVALUATION
Anesthesia Evaluation     Patient summary reviewed and Nursing notes reviewed   no history of anesthetic complications:   NPO Solid Status: > 8 hours  NPO Liquid Status: > 2 hours           Airway   Mallampati: III  TM distance: >3 FB  Neck ROM: full  No difficulty expected  Dental    (+) upper dentures    Pulmonary - normal exam    breath sounds clear to auscultation  (+) a smoker Current, Abstained day of surgery, cigarettes,  Cardiovascular - negative cardio ROS and normal exam  Exercise tolerance: good (4-7 METS)    Rhythm: regular  Rate: normal        Neuro/Psych  (+) psychiatric history Anxiety  GI/Hepatic/Renal/Endo    (+) obesity, renal disease- stones, thyroid problem hypothyroidism    Musculoskeletal (-) negative ROS    Abdominal   (+) obese   Substance History - negative use     OB/GYN negative ob/gyn ROS         Other      history of cancer    ROS/Med Hx Other: PAT Nursing Notes unavailable.                     Anesthesia Plan    ASA 2     general     (Patient understands anesthesia not responsible for dental damage.)  intravenous induction     Anesthetic plan, risks, benefits, and alternatives have been provided, discussed and informed consent has been obtained with: patient and spouse/significant other.    Use of blood products discussed with patient .    Plan discussed with CRNA.        CODE STATUS:          Applied

## 2024-12-19 NOTE — DISCHARGE INSTRUCTIONS
DISCHARGE INSTRUCTIONS  HERNIA      For your surgery you had:  General anesthesia (you may have a sore throat for the first 24 hours)  You received an anesthesia medication today that can cause hormonal forms of birth control to be ineffective. You should use a different form of birth control (to prevent pregnancy) for 7 days.  You may experience dizziness, drowsiness, or light-headedness for several hours following surgery/procedure.  Do not stay alone today or tonight.  Limit your activity for 24 hours.  Resume your diet slowly.  Follow whatever special dietary instructions you may have been given by your doctor.  You should not drive, operate machinery, drink alcohol, or sign legally binding documents for 24 hours or while you are taking pain medication.  Last dose of pain medication was given at:   .  NOTIFY YOUR DOCTOR IF YOU EXPERIENCE ANY OF THE FOLLOWING:  Temperature greater than 101 degrees Fahrenheit  Shaking Chills  Redness or excessive drainage from incision  Nausea, vomiting and/or pain that is not controlled by prescribed medications  Increase in bleeding or bleeding that is excessive  Unable to urinate in 6 hours after surgery  If unable to reach your doctor, please go to the closest Emergency Room [] You may remove dressing: Saturday     [] You may shower Saturday. No tub baths, hot tubs, pools, etc., for 2 weeks.  Apply an ice pack for 24-48 hours.  [] Do not do any heavy lifting, pushing or pulling.  You may walk up and down stairs.  You may ride in a car but do not drive until instructed by your physician.  Avoid constipation. You may take an over the counter stool softener.   If unable to urinate in 6 to 8 hours after surgery or urinating frequently in small amounts, notify your doctor or go to the nearest Emergency Room.  Medications per physician instructions as indicated on Discharge Medication Information Sheet.  You should see   for follow-up care   on  .  Phone number:       SPECIAL  68 year old female from home, lives alone, has past medical hx of hypertension, DM, hypothyroidism came to ED c/o shortness of breath for 2 days that worsened today associated to dry cough and fatigue admitted to medicine for AHRF 2/2 COVID PNA, pt vaccinated against covid with J&JX1 in april 2021.  Patient noted to saturating in 70s in ED on RA , was placed on NRB 15liters with improvement to 93%, Patient noted to desaturate to low 90s with minimal exertion , Patient admited to ICU for close monitoring.         Assessment:  AHRF   COVID PNA   DM  hypothyroidism   HTN       Plan:    =====================[CNS ]==============================  # No issues:   - Currently at baseline mental status     =====================[CVS ]===============================  # HTN :    - BP currently acceptable off medication   - monitor BP     =====================[RESP ]==============================  [facility]  - CXR b/l multfocal pneumonia  -At time of examination in the ED pt on NRB 15Liters   -Started on Lovenox, Remdesivir, Decadron   - started on Albuterol and ipratropium MDI   -Will send ESR, CRP, Procalcitonin  - continue to trend d-dimer, CRP, LDH, Troponin, Ferritin, CPK  -Tylenol PRN for fever  - Monitor for hemodynamic instability   - Isolation precautions.        =====================[ GI ]================================  #  no issues  - c/w PPi while on steroids     ====================[ RENAL ]=============================   # No issues   - Monitor BMP and electrolytes  - I&Os      =====================[ ID ]================================  #   COVID PNA  - rest as above     ===================[ HEME/ONC ]===========================  # D-dimer elevated 411, likley COVID related   - monitor D-dimer every 3 days   -  CBc daily       =====================[ ENDO ]=============================  # Patient has hx of DM on glipizide, pioglitazone, alogliptin metformin, dapagliflozin at home  - Holding home po medications  - Started on HSS  - Fingerstick before meals and at bedtime  - DASH diet with consistent carb  - Target -180  - F/u A1c  - target CBG < 180    ================= Skin/Catheters============================  # No active issues   - No rashes.      ==================[ PROPHYLAXIS ]==========================   # Dvt : Lovenox  # Gi : Protonix     ====================[ DISPO ]==============================   - Monitor in ICU     ===================[ PROGNOSIS ]===========================  - Guarded   INSTRUCTIONS:     You had Tylenol at 12:17 pm            I have read and received the above instructions.     Patient/Responsible Party's Signature Date/Time     RN Signature Date/Time

## 2024-12-20 ENCOUNTER — TELEPHONE (OUTPATIENT)
Dept: SURGERY | Facility: CLINIC | Age: 49
End: 2024-12-20

## 2024-12-20 DIAGNOSIS — K42.9 UMBILICAL HERNIA WITHOUT OBSTRUCTION AND WITHOUT GANGRENE: Primary | ICD-10-CM

## 2024-12-20 RX ORDER — OXYCODONE AND ACETAMINOPHEN 5; 325 MG/1; MG/1
1-2 TABLET ORAL EVERY 4 HOURS PRN
Qty: 13 TABLET | Refills: 0 | Status: SHIPPED | OUTPATIENT
Start: 2024-12-20

## 2024-12-20 NOTE — TELEPHONE ENCOUNTER
Hub staff attempted to follow warm transfer process and was unsuccessful     Caller: Velma Andres    Relationship to patient: Self    Best call back number: Velma Andres     Patient is needing:  PATIENT IS CALLING BACK IN SEVERE PAIN WANTING TO KNOW IF SOMEONE WILL RETURN HER CALL. SHE DOESN'T WANT TO BE IN PAIN THROUGH THE WEEKEND

## 2024-12-20 NOTE — TELEPHONE ENCOUNTER
PATIENT CALLED BACK TO F/U ON MESSAGE.  SHE HAD SURGERY YESTERDAY.  PAIN MEDICATION NOT CONTROLLING PAIN.  I ASKED IF SHE WAS TAKING ANY IBUPROFEN BETWEEN PAIN MEDICATION.  SHE SAID SHE IS NOT, AND THAT SHE CAN'T TAKE IBUPROFEN OR TYLENOL.    DR. LESTER, IS THIS SOMETHING YOU MIGHT ADDRESS?    #[phone]

## 2024-12-20 NOTE — TELEPHONE ENCOUNTER
Pt had umbilical hernia repair yesterday. She has been up moving around a lot for the gas pains and now her incisions are very painful. She said that the Norco 5 is not controlling her pain. She has not been able to sleep or get any rest.

## 2024-12-23 LAB
CYTO UR: NORMAL
LAB AP CASE REPORT: NORMAL
LAB AP CLINICAL INFORMATION: NORMAL
PATH REPORT.FINAL DX SPEC: NORMAL
PATH REPORT.GROSS SPEC: NORMAL

## 2024-12-26 ENCOUNTER — TELEPHONE (OUTPATIENT)
Dept: SURGERY | Facility: CLINIC | Age: 49
End: 2024-12-26

## 2024-12-26 DIAGNOSIS — K42.9 UMBILICAL HERNIA WITHOUT OBSTRUCTION AND WITHOUT GANGRENE: ICD-10-CM

## 2024-12-26 RX ORDER — OXYCODONE AND ACETAMINOPHEN 5; 325 MG/1; MG/1
1 TABLET ORAL EVERY 6 HOURS PRN
Qty: 10 TABLET | Refills: 0 | Status: SHIPPED | OUTPATIENT
Start: 2024-12-26

## 2024-12-26 NOTE — TELEPHONE ENCOUNTER
Caller: Velma Andres     Relationship: SELF     Best call back number: [phone]     Requested Prescriptions:   oxyCODONE-acetaminophen (Percocet) 5-325 MG per tablet        Pharmacy where request should be sent:  [place] [address] [facility]     Last office visit with prescribing clinician: 12/16/2024   Last telemedicine visit with prescribing clinician: Visit date not found   Next office visit with prescribing clinician: 1/3/2025     Additional details provided by patient: PATIENT SAYS SHE FEELS THAT SHE IS PAIN BECAUSE SHE TOOK HER GAUZE OFF IN 3 DAYS AND TAKE A SHOWER , THE ON CALL DOCTOR SHE SPOKE TO YESTERDAY INFORMED HER THAT THE GAUZE WAS SUPPOSE TO STAY ON FOR 5 DAYS.  PATIENT IS CURRENTLY OUT OF GAUZE AND WANTS TO KNOW IF ITS OKAY IF SHE HAS NO GAUZE ON HER INCISIONS RIGHT NOW.     Does the patient have less than a 3 day supply:  [x] Yes  [] No    Would you like a call back once the refill request has been completed: [] Yes [x] No    If the office needs to give you a call back, can they leave a voicemail: [x] Yes [] No    Edson Marroquin Rep   12/26/24 08:40 EST

## 2024-12-27 ENCOUNTER — HOSPITAL ENCOUNTER (EMERGENCY)
Facility: HOSPITAL | Age: 49
Discharge: HOME | End: 2024-12-27
Attending: EMERGENCY MEDICINE
Payer: COMMERCIAL

## 2024-12-27 ENCOUNTER — TELEPHONE (OUTPATIENT)
Dept: SURGERY | Facility: CLINIC | Age: 49
End: 2024-12-27
Payer: COMMERCIAL

## 2024-12-27 VITALS
TEMPERATURE: 97.5 F | BODY MASS INDEX: 32.86 KG/M2 | SYSTOLIC BLOOD PRESSURE: 132 MMHG | DIASTOLIC BLOOD PRESSURE: 80 MMHG | OXYGEN SATURATION: 98 % | HEIGHT: 62 IN | WEIGHT: 178.57 LBS | HEART RATE: 80 BPM | RESPIRATION RATE: 20 BRPM

## 2024-12-27 DIAGNOSIS — R23.9 ALTERATION IN SKIN INTEGRITY RELATED TO SURGICAL INCISION: Primary | ICD-10-CM

## 2024-12-27 LAB
ALBUMIN SERPL-MCNC: 3.9 G/DL (ref 3.5–5.2)
ALBUMIN/GLOB SERPL: 1 G/DL
ALP SERPL-CCNC: 101 U/L (ref 39–117)
ALT SERPL W P-5'-P-CCNC: 18 U/L (ref 1–33)
ANION GAP SERPL CALCULATED.3IONS-SCNC: 13.2 MMOL/L (ref 5–15)
AST SERPL-CCNC: 20 U/L (ref 1–32)
BASOPHILS # BLD AUTO: 0.08 10*3/MM3 (ref 0–0.2)
BASOPHILS NFR BLD AUTO: 1.1 % (ref 0–1.5)
BILIRUB SERPL-MCNC: 0.4 MG/DL (ref 0–1.2)
BUN SERPL-MCNC: 14 MG/DL (ref 6–20)
BUN/CREAT SERPL: 13.1 (ref 7–25)
CALCIUM SPEC-SCNC: 9.3 MG/DL (ref 8.6–10.5)
CHLORIDE SERPL-SCNC: 100 MMOL/L (ref 98–107)
CO2 SERPL-SCNC: 23.8 MMOL/L (ref 22–29)
CREAT SERPL-MCNC: 1.07 MG/DL (ref 0.57–1)
DEPRECATED RDW RBC AUTO: 41.1 FL (ref 37–54)
EGFRCR SERPLBLD CKD-EPI 2021: 64.6 ML/MIN/1.73
EOSINOPHIL # BLD AUTO: 0.34 10*3/MM3 (ref 0–0.4)
EOSINOPHIL NFR BLD AUTO: 4.5 % (ref 0.3–6.2)
ERYTHROCYTE [DISTWIDTH] IN BLOOD BY AUTOMATED COUNT: 12.6 % (ref 12.3–15.4)
GLOBULIN UR ELPH-MCNC: 3.9 GM/DL
GLUCOSE SERPL-MCNC: 85 MG/DL (ref 65–99)
HCT VFR BLD AUTO: 40.7 % (ref 34–46.6)
HGB BLD-MCNC: 13.5 G/DL (ref 12–15.9)
HOLD SPECIMEN: NORMAL
IMM GRANULOCYTES # BLD AUTO: 0.05 10*3/MM3 (ref 0–0.05)
IMM GRANULOCYTES NFR BLD AUTO: 0.7 % (ref 0–0.5)
LYMPHOCYTES # BLD AUTO: 2.23 10*3/MM3 (ref 0.7–3.1)
LYMPHOCYTES NFR BLD AUTO: 29.6 % (ref 19.6–45.3)
MCH RBC QN AUTO: 29.5 PG (ref 26.6–33)
MCHC RBC AUTO-ENTMCNC: 33.2 G/DL (ref 31.5–35.7)
MCV RBC AUTO: 89.1 FL (ref 79–97)
MONOCYTES # BLD AUTO: 0.74 10*3/MM3 (ref 0.1–0.9)
MONOCYTES NFR BLD AUTO: 9.8 % (ref 5–12)
NEUTROPHILS NFR BLD AUTO: 4.09 10*3/MM3 (ref 1.7–7)
NEUTROPHILS NFR BLD AUTO: 54.3 % (ref 42.7–76)
NRBC BLD AUTO-RTO: 0 /100 WBC (ref 0–0.2)
PLATELET # BLD AUTO: 343 10*3/MM3 (ref 140–450)
PMV BLD AUTO: 8.5 FL (ref 6–12)
POTASSIUM SERPL-SCNC: 4.3 MMOL/L (ref 3.5–5.2)
PROT SERPL-MCNC: 7.8 G/DL (ref 6–8.5)
RBC # BLD AUTO: 4.57 10*6/MM3 (ref 3.77–5.28)
SODIUM SERPL-SCNC: 137 MMOL/L (ref 136–145)
WBC NRBC COR # BLD AUTO: 7.53 10*3/MM3 (ref 3.4–10.8)
WHOLE BLOOD HOLD COAG: NORMAL

## 2024-12-27 PROCEDURE — 99283 EMERGENCY DEPT VISIT LOW MDM: CPT

## 2024-12-27 PROCEDURE — 85025 COMPLETE CBC W/AUTO DIFF WBC: CPT

## 2024-12-27 PROCEDURE — 36415 COLL VENOUS BLD VENIPUNCTURE: CPT

## 2024-12-27 PROCEDURE — 80053 COMPREHEN METABOLIC PANEL: CPT

## 2024-12-27 RX ORDER — OXYCODONE AND ACETAMINOPHEN 7.5; 325 MG/1; MG/1
1 TABLET ORAL ONCE
Status: COMPLETED | OUTPATIENT
Start: 2024-12-27 | End: 2024-12-27

## 2024-12-27 RX ADMIN — OXYCODONE HYDROCHLORIDE AND ACETAMINOPHEN 1 TABLET: 7.5; 325 TABLET ORAL at 12:17

## 2024-12-27 NOTE — DISCHARGE INSTRUCTIONS
Please follow-up with your surgeon and primary care provider as needed.  Return to the ED for any increased bleeding, increased pain, fevers greater than 100.4 or any other new or worsening concerning symptoms.

## 2024-12-27 NOTE — ED PROVIDER NOTES
Time: 12:34 PM EST  Date of encounter:  12/27/2024  Independent Historian/Clinical History and Information was obtained by:   Patient    History is limited by: N/A    Chief Complaint: Post op complication      History of Present Illness:  Patient is a 49 y.o. year old female who presents to the emergency department for evaluation of bleeding from surgical incision of the abdomen from hernia repair on 12/19/2024.  Denies any drainage from the incision, surrounding erythema, fevers, chills, body aches.  Patient reports she called her surgeon's office and is advised to come to the ED for bleeding.  Patient denies any increased pain.      Patient Care Team  Primary Care Provider: Jignesh Doe    Past Medical History:     Allergies   Allergen Reactions    Tramadol Other (See Comments), Unknown (See Comments) and Headache     MIGRAINES    Ibuprofen Nausea And Vomiting and Other (See Comments)     ULCER    Other reaction(s): Nausea and vomiting   ULCER     Past Medical History:   Diagnosis Date    Anxiety     Cervix cancer     TREATED NO REOCCURANCE    Hernia of abdominal wall     Kidney stone     Thyroid condition     Umbilical hernia      Past Surgical History:   Procedure Laterality Date    KIDNEY STONE SURGERY      LEG SURGERY      vein repair    VENTRAL HERNIA REPAIR N/A 12/19/2024    Procedure: Robotic umbilical hernia repair-repair hernia at umbilicus with small incisions, camera and robotic instruments;  Surgeon: Devin Abdi MD;  [facility] [place] [facility];  Service: Robotics - [facility];  Laterality: N/A;     Family History   Problem Relation Age of Onset    Malig Hyperthermia Neg Hx        Home Medications:  Prior to Admission medications    Medication Sig Start Date End Date Taking? Authorizing Provider   ALPRAZolam (XANAX) 2 MG tablet TAKE ONE TABLET THREE TIMES DAILY AS NEEDED 10/11/24   Provider, MD Julissa   busPIRone (BUSPAR) 10 MG tablet Take 1 tablet by mouth Every 12 (Twelve) Hours. 9/12/24    Julissa Reed MD   cephalexin (KEFLEX) 500 MG capsule Take 1 capsule by mouth 3 (Three) Times a Day. 12/11/24   Graham Heath MD   cephalexin (KEFLEX) 500 MG capsule Take 1 capsule by mouth 4 (Four) Times a Day for 10 days. 12/19/24 12/29/24  Devin Abdi MD   cloNIDine (CATAPRES) 0.1 MG tablet Take 1 tablet by mouth Every 12 (Twelve) Hours. 10/11/24   Julissa Reed MD   cyclobenzaprine (FLEXERIL) 5 MG tablet Take 1 tablet by mouth 3 (Three) Times a Day As Needed. 9/1/24   Julissa Reed MD   docusate sodium (COLACE) 100 MG capsule Take 1 capsule by mouth 2 (Two) Times a Day. 12/15/24   Reynold Álvarez PA-C   gabapentin (NEURONTIN) 600 MG tablet Take 1 tablet by mouth 2 (Two) Times a Day.    Julissa Reed MD   HYDROcodone-acetaminophen (NORCO) 5-325 MG per tablet Take 1 tablet by mouth Every 6 (Six) Hours As Needed for Moderate Pain (Pain). 12/19/24   Devin Abdi MD   hydrOXYzine (ATARAX) 50 MG tablet Take 1 tablet by mouth 2 (Two) Times a Day As Needed. 10/11/24   Julissa Reed MD   levothyroxine (SYNTHROID, LEVOTHROID) 100 MCG tablet Take 1 tablet by mouth Every Night. 8/30/24   Julissa Reed MD   oxyCODONE-acetaminophen (Percocet) 5-325 MG per tablet Take 1 tablet by mouth Every 6 (Six) Hours As Needed for Moderate Pain or Severe Pain (pain). 12/26/24   Devin Abdi MD   oxyCODONE-acetaminophen (PERCOCET) 7.5-325 MG per tablet Take 1 tablet by mouth Every 6 (Six) Hours As Needed for Severe Pain. 12/13/24   Adithya Plaza MD   venlafaxine XR (EFFEXOR-XR) 150 MG 24 hr capsule Take 2 capsules by mouth Daily As Needed.    Julissa Reed MD        Social History:   Social History     Tobacco Use    Smoking status: Every Day     Current packs/day: 1.00     Types: Cigarettes    Tobacco comments:     INST PER ANESTHESIA PROTOCOL   Vaping Use    Vaping status: Never Used   Substance Use Topics    Alcohol use: Not Currently    Drug use: Never  "        Review of Systems:  Review of Systems   Constitutional:  Negative for chills, fatigue and fever.   HENT:  Negative for ear pain, rhinorrhea and sore throat.    Eyes:  Negative for visual disturbance.   Respiratory:  Negative for cough and shortness of breath.    Cardiovascular:  Negative for chest pain.   Gastrointestinal:  Negative for abdominal pain, diarrhea and vomiting.   Genitourinary:  Negative for difficulty urinating.   Musculoskeletal:  Negative for arthralgias, back pain and myalgias.   Skin:  Positive for wound. Negative for rash.   Neurological:  Negative for light-headedness and headaches.   Hematological:  Negative for adenopathy.   Psychiatric/Behavioral: Negative.          Physical Exam:  /80 (BP Location: Right arm, Patient Position: Sitting)   Pulse 80   Temp 97.5 °F (36.4 °C) (Oral)   Resp 20   Ht 157.5 cm (62\")   Wt 81 kg (178 lb 9.2 oz)   SpO2 98%   BMI 32.66 kg/m²     Physical Exam  Vitals and nursing note reviewed.   Constitutional:       General: She is not in acute distress.     Appearance: Normal appearance. She is not toxic-appearing.   HENT:      Head: Normocephalic and atraumatic.      Nose: Nose normal.      Mouth/Throat:      Mouth: Mucous membranes are moist.   Eyes:      Conjunctiva/sclera: Conjunctivae normal.   Cardiovascular:      Rate and Rhythm: Normal rate.      Pulses: Normal pulses.      Heart sounds: Normal heart sounds.   Pulmonary:      Effort: Pulmonary effort is normal.      Breath sounds: Normal breath sounds.   Abdominal:      General: Bowel sounds are normal.      Palpations: Abdomen is soft.      Tenderness: There is no abdominal tenderness.   Musculoskeletal:         General: Normal range of motion.      Cervical back: Normal range of motion.   Skin:     General: Skin is warm and dry.      Comments: Healing incision proximal to umbilicus with 7 intact sutures.  Dried blood near center of incision.  No active bleeding.  No surrounding erythema " or drainage.   Neurological:      General: No focal deficit present.      Mental Status: She is alert and oriented to person, place, and time.   Psychiatric:         Mood and Affect: Mood normal.         Behavior: Behavior normal.         Thought Content: Thought content normal.         Judgment: Judgment normal.                    Medical Decision Making:      Comorbidities that affect care:    Thyroid Disease    External Notes reviewed:    None      The following orders were placed and all results were independently analyzed by me:  Orders Placed This Encounter   Procedures    Comprehensive Metabolic Panel    CBC Auto Differential    CBC & Differential    Extra Tubes    Gold Top - SST    Light Blue Top       Medications Given in the Emergency Department:  Medications   oxyCODONE-acetaminophen (PERCOCET) 7.5-325 MG per tablet 1 tablet (1 tablet Oral Given 12/27/24 1217)        ED Course:         Labs:    Lab Results (last 24 hours)       Procedure Component Value Units Date/Time    CBC & Differential [[id number]]  (Abnormal) Collected: 12/27/24 1114    Specimen: Blood Updated: 12/27/24 1122    Narrative:      The following orders were created for panel order CBC & Differential.  Procedure                               Abnormality         Status                     ---------                               -----------         ------                     CBC Auto Differential[[id number]]        Abnormal            Final result                 Please view results for these tests on the individual orders.    Comprehensive Metabolic Panel [[id number]]  (Abnormal) Collected: 12/27/24 1114    Specimen: Blood Updated: 12/27/24 1145     Glucose 85 mg/dL      BUN 14 mg/dL      Creatinine 1.07 mg/dL      Sodium 137 mmol/L      Potassium 4.3 mmol/L      Comment: Slight hemolysis detected by analyzer. Result may be falsely elevated.        Chloride 100 mmol/L      CO2 23.8 mmol/L      Calcium 9.3 mg/dL      Total Protein 7.8 g/dL       Albumin 3.9 g/dL      ALT (SGPT) 18 U/L      AST (SGOT) 20 U/L      Comment: Slight hemolysis detected by analyzer. Result may be falsely elevated.        Alkaline Phosphatase 101 U/L      Total Bilirubin 0.4 mg/dL      Globulin 3.9 gm/dL      A/G Ratio 1.0 g/dL      BUN/Creatinine Ratio 13.1     Anion Gap 13.2 mmol/L      eGFR 64.6 mL/min/1.73     Narrative:      GFR Categories in Chronic Kidney Disease (CKD)      GFR Category          GFR (mL/min/1.73)    Interpretation  G1                     90 or greater         Normal or high (1)  G2                      60-89                Mild decrease (1)  G3a                   45-59                Mild to moderate decrease  G3b                   30-44                Moderate to severe decrease  G4                    15-29                Severe decrease  G5                    14 or less           Kidney failure          (1)In the absence of evidence of kidney disease, neither GFR category G1 or G2 fulfill the criteria for CKD.    eGFR calculation 2021 CKD-EPI creatinine equation, which does not include race as a factor    CBC Auto Differential [[id number]]  (Abnormal) Collected: 12/27/24 1114    Specimen: Blood Updated: 12/27/24 1122     WBC 7.53 10*3/mm3      RBC 4.57 10*6/mm3      Hemoglobin 13.5 g/dL      Hematocrit 40.7 %      MCV 89.1 fL      MCH 29.5 pg      MCHC 33.2 g/dL      RDW 12.6 %      RDW-SD 41.1 fl      MPV 8.5 fL      Platelets 343 10*3/mm3      Neutrophil % 54.3 %      Lymphocyte % 29.6 %      Monocyte % 9.8 %      Eosinophil % 4.5 %      Basophil % 1.1 %      Immature Grans % 0.7 %      Neutrophils, Absolute 4.09 10*3/mm3      Lymphocytes, Absolute 2.23 10*3/mm3      Monocytes, Absolute 0.74 10*3/mm3      Eosinophils, Absolute 0.34 10*3/mm3      Basophils, Absolute 0.08 10*3/mm3      Immature Grans, Absolute 0.05 10*3/mm3      nRBC 0.0 /100 WBC              Imaging:    No Radiology Exams Resulted Within Past 24 Hours      Differential Diagnosis and  Discussion:    Wound Evaluation: Differential diagnosis includes but is not limited to laceration, abrasion, puncture, burn, ulcer, cellulitis, abscess, vasculitis, malignancy, and rash.    PROCEDURES:    Labs were collected in the emergency department and all labs were reviewed and interpreted by me.    No orders to display       Procedures    MDM                     Patient Care Considerations:    ANTIBIOTICS: I considered prescribing antibiotics as an outpatient however no bacterial focus of infection was found.      Consultants/Shared Management Plan:    None    Social Determinants of Health:    Patient is independent, reliable, and has access to care.       Disposition and Care Coordination:    Discharged: The patient is suitable and stable for discharge with no need for consideration of admission.    I have explained the patient´s condition, diagnoses and treatment plan based on the information available to me at this time. I have answered questions and addressed any concerns. The patient has a good  understanding of the patient´s diagnosis, condition, and treatment plan as can be expected at this point. The vital signs have been stable. The patient´s condition is stable and appropriate for discharge from the emergency department.      The patient will pursue further outpatient evaluation with the primary care physician or other designated or consulting physician as outlined in the discharge instructions. They are agreeable to this plan of care and follow-up instructions have been explained in detail. The patient has received these instructions in written format and has expressed an understanding of the discharge instructions. The patient is aware that any significant change in condition or worsening of symptoms should prompt an immediate return to this or the closest emergency department or call to 911.  I have explained discharge medications and the need for follow up with the patient/caretakers. This was  also printed in the discharge instructions. Patient was discharged with the following medications and follow up:      Medication List      No changes were made to your prescriptions during this visit.      Jignesh Doe  [address] [facility]  [place] [address]  [phone]    Go to   As needed    Devin Abdi MD  [address] [place], [place] [address] [phone]    Schedule an appointment as soon as possible for a visit          Final diagnoses:   Alteration in skin integrity related to surgical incision        ED Disposition       ED Disposition   Discharge    Condition   Stable    Comment   --               This medical record created using voice recognition software.             Marianne Plaza, APRN  12/27/24 4284

## 2024-12-27 NOTE — TELEPHONE ENCOUNTER
Provider: LULU    Caller: Velma Andres    Relationship to Patient: Self    Phone Number: [phone]    Reason for Call: PATIENT STATES SHE HAS EXCESSIVE BLEEDING FROM INCISION FROM Robotic umbilical hernia repair-repair hernia at umbilicus with small incisions, camera and robotic instruments PERFORMED ON 12/19. WENT TO ED BUT WAS SENT HOME TOLD INCISION WAS HEALING NICELY. ONCE HOME INCISION WAS BLEEDING AGAIN.     When was the patient last seen: 12/19

## 2024-12-29 LAB
QT INTERVAL: 433 MS
QTC INTERVAL: 505 MS

## 2024-12-30 NOTE — TELEPHONE ENCOUNTER
OFFERED PT AN APPT THIS AFTERNOON BUT SHE DECLINED AND REQUESTED TO KEEP APPT FOR FRIDAY. INFORMED PT TO CALL BACK IF SYMPTOMS WORSEN.

## 2024-12-31 ENCOUNTER — APPOINTMENT (OUTPATIENT)
Dept: CT IMAGING | Facility: HOSPITAL | Age: 49
End: 2024-12-31
Payer: MEDICARE

## 2024-12-31 ENCOUNTER — APPOINTMENT (OUTPATIENT)
Dept: GENERAL RADIOLOGY | Facility: HOSPITAL | Age: 49
End: 2024-12-31
Payer: MEDICARE

## 2024-12-31 ENCOUNTER — TELEPHONE (OUTPATIENT)
Dept: SURGERY | Facility: CLINIC | Age: 49
End: 2024-12-31
Payer: COMMERCIAL

## 2024-12-31 ENCOUNTER — HOSPITAL ENCOUNTER (EMERGENCY)
Facility: HOSPITAL | Age: 49
Discharge: HOME | End: 2025-01-01
Attending: EMERGENCY MEDICINE
Payer: MEDICARE

## 2024-12-31 DIAGNOSIS — R10.9 ABDOMINAL PAIN, UNSPECIFIED ABDOMINAL LOCATION: Primary | ICD-10-CM

## 2024-12-31 LAB
BASOPHILS # BLD AUTO: 0.11 10*3/MM3 (ref 0–0.2)
BASOPHILS NFR BLD AUTO: 1 % (ref 0–1.5)
DEPRECATED RDW RBC AUTO: 41.3 FL (ref 37–54)
EOSINOPHIL # BLD AUTO: 0.31 10*3/MM3 (ref 0–0.4)
EOSINOPHIL NFR BLD AUTO: 2.9 % (ref 0.3–6.2)
ERYTHROCYTE [DISTWIDTH] IN BLOOD BY AUTOMATED COUNT: 12.7 % (ref 12.3–15.4)
HCT VFR BLD AUTO: 37.2 % (ref 34–46.6)
HGB BLD-MCNC: 12.3 G/DL (ref 12–15.9)
HOLD SPECIMEN: NORMAL
HOLD SPECIMEN: NORMAL
IMM GRANULOCYTES # BLD AUTO: 0.03 10*3/MM3 (ref 0–0.05)
IMM GRANULOCYTES NFR BLD AUTO: 0.3 % (ref 0–0.5)
LYMPHOCYTES # BLD AUTO: 2.89 10*3/MM3 (ref 0.7–3.1)
LYMPHOCYTES NFR BLD AUTO: 27.3 % (ref 19.6–45.3)
MCH RBC QN AUTO: 29.2 PG (ref 26.6–33)
MCHC RBC AUTO-ENTMCNC: 33.1 G/DL (ref 31.5–35.7)
MCV RBC AUTO: 88.4 FL (ref 79–97)
MONOCYTES # BLD AUTO: 0.93 10*3/MM3 (ref 0.1–0.9)
MONOCYTES NFR BLD AUTO: 8.8 % (ref 5–12)
NEUTROPHILS NFR BLD AUTO: 59.7 % (ref 42.7–76)
NEUTROPHILS NFR BLD AUTO: 6.33 10*3/MM3 (ref 1.7–7)
NRBC BLD AUTO-RTO: 0 /100 WBC (ref 0–0.2)
PLATELET # BLD AUTO: 303 10*3/MM3 (ref 140–450)
PMV BLD AUTO: 8.8 FL (ref 6–12)
RBC # BLD AUTO: 4.21 10*6/MM3 (ref 3.77–5.28)
WBC NRBC COR # BLD AUTO: 10.6 10*3/MM3 (ref 3.4–10.8)
WHOLE BLOOD HOLD COAG: NORMAL
WHOLE BLOOD HOLD SPECIMEN: NORMAL

## 2024-12-31 PROCEDURE — 93005 ELECTROCARDIOGRAM TRACING: CPT | Performed by: EMERGENCY MEDICINE

## 2024-12-31 PROCEDURE — 96374 THER/PROPH/DIAG INJ IV PUSH: CPT

## 2024-12-31 PROCEDURE — 85025 COMPLETE CBC W/AUTO DIFF WBC: CPT | Performed by: EMERGENCY MEDICINE

## 2024-12-31 PROCEDURE — 25010000002 ONDANSETRON PER 1 MG: Performed by: EMERGENCY MEDICINE

## 2024-12-31 PROCEDURE — 71045 X-RAY EXAM CHEST 1 VIEW: CPT

## 2024-12-31 PROCEDURE — 25010000002 HYDROMORPHONE 1 MG/ML SOLUTION: Performed by: EMERGENCY MEDICINE

## 2024-12-31 PROCEDURE — 83690 ASSAY OF LIPASE: CPT | Performed by: EMERGENCY MEDICINE

## 2024-12-31 PROCEDURE — 99285 EMERGENCY DEPT VISIT HI MDM: CPT

## 2024-12-31 PROCEDURE — 96375 TX/PRO/DX INJ NEW DRUG ADDON: CPT

## 2024-12-31 PROCEDURE — 80053 COMPREHEN METABOLIC PANEL: CPT | Performed by: EMERGENCY MEDICINE

## 2024-12-31 PROCEDURE — 84484 ASSAY OF TROPONIN QUANT: CPT | Performed by: EMERGENCY MEDICINE

## 2024-12-31 PROCEDURE — 83880 ASSAY OF NATRIURETIC PEPTIDE: CPT | Performed by: EMERGENCY MEDICINE

## 2024-12-31 RX ORDER — ONDANSETRON 2 MG/ML
4 INJECTION INTRAMUSCULAR; INTRAVENOUS ONCE
Status: COMPLETED | OUTPATIENT
Start: 2025-01-01 | End: 2024-12-31

## 2024-12-31 RX ORDER — SODIUM CHLORIDE 0.9 % (FLUSH) 0.9 %
10 SYRINGE (ML) INJECTION AS NEEDED
Status: DISCONTINUED | OUTPATIENT
Start: 2024-12-31 | End: 2025-01-01 | Stop reason: HOSPADM

## 2024-12-31 RX ADMIN — HYDROMORPHONE HYDROCHLORIDE 0.5 MG: 1 INJECTION, SOLUTION INTRAMUSCULAR; INTRAVENOUS; SUBCUTANEOUS at 23:59

## 2024-12-31 RX ADMIN — ONDANSETRON 4 MG: 2 INJECTION INTRAMUSCULAR; INTRAVENOUS at 23:59

## 2024-12-31 NOTE — TELEPHONE ENCOUNTER
PATIENT CALLED TO F/U ON POST-OP REQUEST FOR PAIN MEDICATION.  SHE SAID SHE ASKED FOR IT YESTERDAY WHEN SHE WAS CALLED FROM OUR OFFICE.      JOVAN LENZ.    REUBEN#[phone]

## 2025-01-01 ENCOUNTER — APPOINTMENT (OUTPATIENT)
Dept: CT IMAGING | Facility: HOSPITAL | Age: 50
End: 2025-01-01
Payer: MEDICARE

## 2025-01-01 VITALS
OXYGEN SATURATION: 95 % | HEIGHT: 62 IN | WEIGHT: 182.76 LBS | RESPIRATION RATE: 17 BRPM | HEART RATE: 110 BPM | SYSTOLIC BLOOD PRESSURE: 118 MMHG | TEMPERATURE: 98.1 F | DIASTOLIC BLOOD PRESSURE: 83 MMHG | BODY MASS INDEX: 33.63 KG/M2

## 2025-01-01 LAB
ALBUMIN SERPL-MCNC: 3.5 G/DL (ref 3.5–5.2)
ALBUMIN/GLOB SERPL: 1 G/DL
ALP SERPL-CCNC: 93 U/L (ref 39–117)
ALT SERPL W P-5'-P-CCNC: 14 U/L (ref 1–33)
ANION GAP SERPL CALCULATED.3IONS-SCNC: 12.7 MMOL/L (ref 5–15)
AST SERPL-CCNC: 15 U/L (ref 1–32)
BACTERIA UR QL AUTO: ABNORMAL /HPF
BILIRUB SERPL-MCNC: <0.2 MG/DL (ref 0–1.2)
BILIRUB UR QL STRIP: NEGATIVE
BUN SERPL-MCNC: 17 MG/DL (ref 6–20)
BUN/CREAT SERPL: 15.5 (ref 7–25)
CALCIUM SPEC-SCNC: 9.2 MG/DL (ref 8.6–10.5)
CHLORIDE SERPL-SCNC: 106 MMOL/L (ref 98–107)
CLARITY UR: CLEAR
CO2 SERPL-SCNC: 22.3 MMOL/L (ref 22–29)
COLOR UR: YELLOW
CREAT SERPL-MCNC: 1.1 MG/DL (ref 0.57–1)
EGFRCR SERPLBLD CKD-EPI 2021: 62.5 ML/MIN/1.73
GEN 5 1HR TROPONIN T REFLEX: <6 NG/L
GLOBULIN UR ELPH-MCNC: 3.5 GM/DL
GLUCOSE SERPL-MCNC: 114 MG/DL (ref 65–99)
GLUCOSE UR STRIP-MCNC: NEGATIVE MG/DL
HGB UR QL STRIP.AUTO: ABNORMAL
HYALINE CASTS UR QL AUTO: ABNORMAL /LPF
KETONES UR QL STRIP: NEGATIVE
LEUKOCYTE ESTERASE UR QL STRIP.AUTO: NEGATIVE
LIPASE SERPL-CCNC: 38 U/L (ref 13–60)
NITRITE UR QL STRIP: NEGATIVE
NT-PROBNP SERPL-MCNC: 43.6 PG/ML (ref 0–450)
PH UR STRIP.AUTO: 6.5 [PH] (ref 5–8)
POTASSIUM SERPL-SCNC: 3.9 MMOL/L (ref 3.5–5.2)
PROT SERPL-MCNC: 7 G/DL (ref 6–8.5)
PROT UR QL STRIP: NEGATIVE
QT INTERVAL: 348 MS
QTC INTERVAL: 481 MS
RBC # UR STRIP: ABNORMAL /HPF
REF LAB TEST METHOD: ABNORMAL
SODIUM SERPL-SCNC: 141 MMOL/L (ref 136–145)
SP GR UR STRIP: 1.02 (ref 1–1.03)
SQUAMOUS #/AREA URNS HPF: ABNORMAL /HPF
TROPONIN T NUMERIC DELTA: NORMAL
TROPONIN T SERPL HS-MCNC: <6 NG/L
UROBILINOGEN UR QL STRIP: ABNORMAL
WBC # UR STRIP: ABNORMAL /HPF

## 2025-01-01 PROCEDURE — 25010000002 DIPHENHYDRAMINE PER 50 MG: Performed by: EMERGENCY MEDICINE

## 2025-01-01 PROCEDURE — 74177 CT ABD & PELVIS W/CONTRAST: CPT

## 2025-01-01 PROCEDURE — 96375 TX/PRO/DX INJ NEW DRUG ADDON: CPT

## 2025-01-01 PROCEDURE — 94799 UNLISTED PULMONARY SVC/PX: CPT

## 2025-01-01 PROCEDURE — 36415 COLL VENOUS BLD VENIPUNCTURE: CPT

## 2025-01-01 PROCEDURE — 25510000001 IOPAMIDOL PER 1 ML: Performed by: EMERGENCY MEDICINE

## 2025-01-01 PROCEDURE — 71275 CT ANGIOGRAPHY CHEST: CPT

## 2025-01-01 PROCEDURE — 25010000002 LORAZEPAM PER 2 MG: Performed by: EMERGENCY MEDICINE

## 2025-01-01 PROCEDURE — 94640 AIRWAY INHALATION TREATMENT: CPT

## 2025-01-01 PROCEDURE — 81001 URINALYSIS AUTO W/SCOPE: CPT | Performed by: EMERGENCY MEDICINE

## 2025-01-01 PROCEDURE — 84484 ASSAY OF TROPONIN QUANT: CPT | Performed by: EMERGENCY MEDICINE

## 2025-01-01 RX ORDER — DICYCLOMINE HCL 20 MG
20 TABLET ORAL EVERY 6 HOURS PRN
Qty: 12 TABLET | Refills: 0 | Status: SHIPPED | OUTPATIENT
Start: 2025-01-01

## 2025-01-01 RX ORDER — HYDROCODONE BITARTRATE AND ACETAMINOPHEN 5; 325 MG/1; MG/1
1 TABLET ORAL ONCE
Status: COMPLETED | OUTPATIENT
Start: 2025-01-01 | End: 2025-01-01

## 2025-01-01 RX ORDER — IPRATROPIUM BROMIDE AND ALBUTEROL SULFATE 2.5; .5 MG/3ML; MG/3ML
SOLUTION RESPIRATORY (INHALATION)
Status: COMPLETED
Start: 2025-01-01 | End: 2025-01-01

## 2025-01-01 RX ORDER — ONDANSETRON 4 MG/1
4 TABLET, ORALLY DISINTEGRATING ORAL EVERY 6 HOURS PRN
Qty: 15 TABLET | Refills: 0 | Status: SHIPPED | OUTPATIENT
Start: 2025-01-01

## 2025-01-01 RX ORDER — DIPHENHYDRAMINE HYDROCHLORIDE 50 MG/ML
12.5 INJECTION INTRAMUSCULAR; INTRAVENOUS ONCE
Status: COMPLETED | OUTPATIENT
Start: 2025-01-01 | End: 2025-01-01

## 2025-01-01 RX ORDER — LORAZEPAM 2 MG/ML
1 INJECTION INTRAMUSCULAR ONCE
Status: COMPLETED | OUTPATIENT
Start: 2025-01-01 | End: 2025-01-01

## 2025-01-01 RX ORDER — IOPAMIDOL 755 MG/ML
100 INJECTION, SOLUTION INTRAVASCULAR
Status: COMPLETED | OUTPATIENT
Start: 2025-01-01 | End: 2025-01-01

## 2025-01-01 RX ORDER — IPRATROPIUM BROMIDE AND ALBUTEROL SULFATE 2.5; .5 MG/3ML; MG/3ML
3 SOLUTION RESPIRATORY (INHALATION) ONCE
Status: COMPLETED | OUTPATIENT
Start: 2025-01-01 | End: 2025-01-01

## 2025-01-01 RX ADMIN — DIPHENHYDRAMINE HYDROCHLORIDE 12.5 MG: 50 INJECTION, SOLUTION INTRAMUSCULAR; INTRAVENOUS at 01:09

## 2025-01-01 RX ADMIN — IOPAMIDOL 100 ML: 755 INJECTION, SOLUTION INTRAVENOUS at 01:06

## 2025-01-01 RX ADMIN — IPRATROPIUM BROMIDE AND ALBUTEROL SULFATE 3 ML: .5; 3 SOLUTION RESPIRATORY (INHALATION) at 01:07

## 2025-01-01 RX ADMIN — LORAZEPAM 1 MG: 2 INJECTION INTRAMUSCULAR; INTRAVENOUS at 01:09

## 2025-01-01 RX ADMIN — HYDROCODONE BITARTRATE AND ACETAMINOPHEN 1 TABLET: 5; 325 TABLET ORAL at 02:01

## 2025-01-01 RX ADMIN — IPRATROPIUM BROMIDE AND ALBUTEROL SULFATE 3 ML: 2.5; .5 SOLUTION RESPIRATORY (INHALATION) at 01:07

## 2025-01-01 NOTE — DISCHARGE INSTRUCTIONS
Your workup today is very reassuring as your white blood cell count is normal your CTs show no evidence of emergency condition.  Follow-up with Dr. Abdi in the next 1 to 2 days.  Return to the ER only for fever, uncontrolled vomiting.  You have had numerous CT scans here in the past 1 month and should avoid any further emergency department workup for persistent pain.  Talk to your family doctor or chronic pain management regarding chronic pain control.  Stay well-hydrated and eat a high-fiber diet.  Use stool softeners as needed.

## 2025-01-01 NOTE — ED PROVIDER NOTES
Time: 11:29 PM EST  Date of encounter:  12/31/2024  Independent Historian/Clinical History and Information was obtained by:   Patient    History is limited by: N/A    Chief Complaint: Abdominal pain      History of Present Illness:  Patient is a 49 y.o. year old female who presents to the emergency department for evaluation of abdominal pain.  Patient states she had hernia surgery 12/19/2024 at our facility.  Presents today for worsening suprapubic/vaginal/lower abdominal pain.  Complains of nausea but no vomiting.  Denies any chest pain.  Afebrile.  Did have a soft bowel movement recently and denies constipation.      Patient Care Team  Primary Care Provider: Jignesh Doe    Past Medical History:     Allergies   Allergen Reactions    Ct Contrast Anaphylaxis    Tramadol Other (See Comments), Unknown (See Comments) and Headache     MIGRAINES    Ibuprofen Nausea And Vomiting and Other (See Comments)     ULCER    Other reaction(s): Nausea and vomiting   ULCER     Past Medical History:   Diagnosis Date    Anxiety     Cervix cancer     TREATED NO REOCCURANCE    Hernia of abdominal wall     Kidney stone     Thyroid condition     Umbilical hernia      Past Surgical History:   Procedure Laterality Date    KIDNEY STONE SURGERY      LEG SURGERY      vein repair    VENTRAL HERNIA REPAIR N/A 12/19/2024    Procedure: Robotic umbilical hernia repair-repair hernia at umbilicus with small incisions, camera and robotic instruments;  Surgeon: Devin Abdi MD;  [place] [facility] [place]  Service: Robotics - [facility];  Laterality: N/A;     Family History   Problem Relation Age of Onset    Malig Hyperthermia Neg Hx        Home Medications:  Prior to Admission medications    Medication Sig Start Date End Date Taking? Authorizing Provider   ALPRAZolam (XANAX) 2 MG tablet TAKE ONE TABLET THREE TIMES DAILY AS NEEDED 10/11/24   Provider, MD Julissa   busPIRone (BUSPAR) 10 MG tablet Take 1 tablet by mouth Every 12 (Twelve) Hours.  9/12/24   Julissa Reed MD   cephalexin (KEFLEX) 500 MG capsule Take 1 capsule by mouth 3 (Three) Times a Day. 12/11/24   Graham Heath MD   cloNIDine (CATAPRES) 0.1 MG tablet Take 1 tablet by mouth Every 12 (Twelve) Hours. 10/11/24   Julissa Reed MD   cyclobenzaprine (FLEXERIL) 5 MG tablet Take 1 tablet by mouth 3 (Three) Times a Day As Needed. 9/1/24   Julissa Reed MD   docusate sodium (COLACE) 100 MG capsule Take 1 capsule by mouth 2 (Two) Times a Day. 12/15/24   Reynold Álvarez PA-C   gabapentin (NEURONTIN) 600 MG tablet Take 1 tablet by mouth 2 (Two) Times a Day.    Julissa Reed MD   HYDROcodone-acetaminophen (NORCO) 5-325 MG per tablet Take 1 tablet by mouth Every 6 (Six) Hours As Needed for Moderate Pain (Pain). 12/19/24   Devin Abdi MD   hydrOXYzine (ATARAX) 50 MG tablet Take 1 tablet by mouth 2 (Two) Times a Day As Needed. 10/11/24   Julissa Reed MD   levothyroxine (SYNTHROID, LEVOTHROID) 100 MCG tablet Take 1 tablet by mouth Every Night. 8/30/24   Juilssa Reed MD   oxyCODONE-acetaminophen (Percocet) 5-325 MG per tablet Take 1 tablet by mouth Every 6 (Six) Hours As Needed for Moderate Pain or Severe Pain (pain). 12/26/24   Devin Abdi MD   oxyCODONE-acetaminophen (PERCOCET) 7.5-325 MG per tablet Take 1 tablet by mouth Every 6 (Six) Hours As Needed for Severe Pain. 12/13/24   Adithya Plaza MD   venlafaxine XR (EFFEXOR-XR) 150 MG 24 hr capsule Take 2 capsules by mouth Daily As Needed.    Julissa Reed MD        Social History:   Social History     Tobacco Use    Smoking status: Every Day     Current packs/day: 1.00     Types: Cigarettes    Smokeless tobacco: Never    Tobacco comments:     INST PER ANESTHESIA PROTOCOL   Vaping Use    Vaping status: Never Used   Substance Use Topics    Alcohol use: Not Currently    Drug use: Never         Review of Systems:  Review of Systems   Constitutional:  Negative for chills and fever.  "  HENT:  Negative for congestion, rhinorrhea and sore throat.    Eyes:  Negative for photophobia.   Respiratory:  Positive for shortness of breath. Negative for apnea, cough and chest tightness.    Cardiovascular:  Negative for chest pain and palpitations.   Gastrointestinal:  Positive for abdominal pain and nausea. Negative for diarrhea and vomiting.   Endocrine: Negative.    Genitourinary:  Negative for difficulty urinating and dysuria.   Musculoskeletal:  Negative for back pain, joint swelling and myalgias.   Skin:  Negative for color change and wound.   Allergic/Immunologic: Negative.    Neurological:  Negative for seizures and headaches.   Psychiatric/Behavioral: Negative.     All other systems reviewed and are negative.       Physical Exam:  /53 (BP Location: Left arm, Patient Position: Lying)   Pulse (!) 121   Temp 98.1 °F (36.7 °C) (Oral)   Resp 20   Ht 157.5 cm (62\")   Wt 82.9 kg (182 lb 12.2 oz)   SpO2 (!) 89%   BMI 33.43 kg/m²     Physical Exam  Vitals and nursing note reviewed.   Constitutional:       General: She is awake.      Appearance: Normal appearance. She is well-developed.   HENT:      Head: Normocephalic and atraumatic.      Nose: Nose normal.      Mouth/Throat:      Mouth: Mucous membranes are moist.   Eyes:      Extraocular Movements: Extraocular movements intact.      Pupils: Pupils are equal, round, and reactive to light.   Cardiovascular:      Rate and Rhythm: Normal rate and regular rhythm.      Heart sounds: Normal heart sounds.   Pulmonary:      Effort: Pulmonary effort is normal. No respiratory distress.      Breath sounds: Normal breath sounds. No wheezing, rhonchi or rales.   Abdominal:      General: Bowel sounds are normal.      Palpations: Abdomen is soft.      Tenderness: There is abdominal tenderness (Mild, lower abdomen). There is no guarding or rebound.      Comments: No rigidity.  Supraumbilical incision healing well with no cellulitis or drainage/purulence "   Musculoskeletal:         General: No tenderness. Normal range of motion.      Cervical back: Normal range of motion and neck supple.   Skin:     General: Skin is warm and dry.      Coloration: Skin is not jaundiced.   Neurological:      General: No focal deficit present.      Mental Status: Mental status is at baseline.   Psychiatric:         Mood and Affect: Mood is anxious.                    Medical Decision Making:      Comorbidities that affect care:    Umbilical hernia repair, anxiety, thyroid disease    External Notes reviewed:    Previous Operation Note: Surgery 12/19/2024 with Dr. Abdi.  Description: Robotic umbilical hernia repair      The following orders were placed and all results were independently analyzed by me:  Orders Placed This Encounter   Procedures    XR Chest 1 View    CT Abdomen Pelvis With Contrast    CT Angiogram Chest Pulmonary Embolism    [place] Draw    Comprehensive Metabolic Panel    BNP    High Sensitivity Troponin T    CBC Auto Differential    Lipase    High Sensitivity Troponin T 1Hr    Urinalysis With Culture If Indicated - Urine, Clean Catch    Urinalysis, Microscopic Only - Urine, Clean Catch    NPO Diet NPO Type: Strict NPO    Undress & Gown    Continuous Pulse Oximetry    Vital Signs    Oxygen Therapy- Nasal Cannula; Titrate 1-6 LPM Per SpO2; 90 - 95%    ECG 12 Lead ED Triage Standing Order; SOA    Insert Peripheral IV    CBC & Differential    Green Top (Gel)    Lavender Top    Gold Top - SST    Light Blue Top       Medications Given in the Emergency Department:  Medications   sodium chloride 0.9 % flush 10 mL (has no administration in time range)   ondansetron (ZOFRAN) injection 4 mg (4 mg Intravenous Given 12/31/24 2359)   HYDROmorphone (DILAUDID) injection 0.5 mg (0.5 mg Intravenous Given 12/31/24 2359)   diphenhydrAMINE (BENADRYL) injection 12.5 mg (12.5 mg Intravenous Given 1/1/25 0109)   LORazepam (ATIVAN) injection 1 mg (1 mg Intravenous Given 1/1/25 0109)  "  iopamidol (ISOVUE-370) 76 % injection 100 mL (100 mL Intravenous Given 1/1/25 0106)   ipratropium-albuterol (DUO-NEB) nebulizer solution 3 mL (3 mL Nebulization Given 1/1/25 0107)   HYDROcodone-acetaminophen (NORCO) 5-325 MG per tablet 1 tablet (1 tablet Oral Given 1/1/25 0201)        ED Course:    ED Course as of 01/01/25 0212 Wed Jan 01, 2025 0147 I have personally interpreted the EKG today and it shows no evidence of any acute ischemia or heart arrhythmia. [RP]   0149 Patient sleeping soundly on reevaluation.  Unable to hold her eyes open.  Alerted to negative CT findings she asked \"what about my pain?\" [RP]      ED Course User Index  [RP] Waldemar Rush MD       Labs:    Lab Results (last 24 hours)       Procedure Component Value Units Date/Time    CBC & Differential [[id number]]  (Abnormal) Collected: 12/31/24 2340    Specimen: Blood Updated: 12/31/24 2346    Narrative:      The following orders were created for panel order CBC & Differential.  Procedure                               Abnormality         Status                     ---------                               -----------         ------                     CBC Auto Differential[[id number]]        Abnormal            Final result                 Please view results for these tests on the individual orders.    Comprehensive Metabolic Panel [[id number]]  (Abnormal) Collected: 12/31/24 2340    Specimen: Blood Updated: 01/01/25 0012     Glucose 114 mg/dL      BUN 17 mg/dL      Creatinine 1.10 mg/dL      Sodium 141 mmol/L      Potassium 3.9 mmol/L      Chloride 106 mmol/L      CO2 22.3 mmol/L      Calcium 9.2 mg/dL      Total Protein 7.0 g/dL      Albumin 3.5 g/dL      ALT (SGPT) 14 U/L      AST (SGOT) 15 U/L      Alkaline Phosphatase 93 U/L      Total Bilirubin <0.2 mg/dL      Globulin 3.5 gm/dL      A/G Ratio 1.0 g/dL      BUN/Creatinine Ratio 15.5     Anion Gap 12.7 mmol/L      eGFR 62.5 mL/min/1.73     Narrative:      GFR Categories in Chronic " Kidney Disease (CKD)      GFR Category          GFR (mL/min/1.73)    Interpretation  G1                     90 or greater         Normal or high (1)  G2                      60-89                Mild decrease (1)  G3a                   45-59                Mild to moderate decrease  G3b                   30-44                Moderate to severe decrease  G4                    15-29                Severe decrease  G5                    14 or less           Kidney failure          (1)In the absence of evidence of kidney disease, neither GFR category G1 or G2 fulfill the criteria for CKD.    eGFR calculation 2021 CKD-EPI creatinine equation, which does not include race as a factor    BNP [[id number]]  (Normal) Collected: 12/31/24 2340    Specimen: Blood Updated: 01/01/25 0011     proBNP 43.6 pg/mL     Narrative:      This assay is used as an aid in the diagnosis of individuals suspected of having heart failure. It can be used as an aid in the diagnosis of acute decompensated heart failure (ADHF) in patients presenting with signs and symptoms of ADHF to the emergency department (ED). In addition, NT-proBNP of <300 pg/mL indicates ADHF is not likely.    Age Range Result Interpretation  NT-proBNP Concentration (pg/mL:      <50             Positive            >450                   Gray                 300-450                    Negative             <300    50-75           Positive            >900                  Gray                300-900                  Negative            <300      >75             Positive            >1800                  Gray                300-1800                  Negative            <300    High Sensitivity Troponin T [[id number]]  (Normal) Collected: 12/31/24 2340    Specimen: Blood Updated: 01/01/25 0012     HS Troponin T <6 ng/L     Narrative:      High Sensitive Troponin T Reference Range:  <14.0 ng/L- Negative Female for AMI  <22.0 ng/L- Negative Male for AMI  >=14 - Abnormal Female indicating  possible myocardial injury.  >=22 - Abnormal Male indicating possible myocardial injury.   Clinicians would have to utilize clinical acumen, EKG, Troponin, and serial changes to determine if it is an Acute Myocardial Infarction or myocardial injury due to an underlying chronic condition.         CBC Auto Differential [[id number]]  (Abnormal) Collected: 12/31/24 2340    Specimen: Blood Updated: 12/31/24 2346     WBC 10.60 10*3/mm3      RBC 4.21 10*6/mm3      Hemoglobin 12.3 g/dL      Hematocrit 37.2 %      MCV 88.4 fL      MCH 29.2 pg      MCHC 33.1 g/dL      RDW 12.7 %      RDW-SD 41.3 fl      MPV 8.8 fL      Platelets 303 10*3/mm3      Neutrophil % 59.7 %      Lymphocyte % 27.3 %      Monocyte % 8.8 %      Eosinophil % 2.9 %      Basophil % 1.0 %      Immature Grans % 0.3 %      Neutrophils, Absolute 6.33 10*3/mm3      Lymphocytes, Absolute 2.89 10*3/mm3      Monocytes, Absolute 0.93 10*3/mm3      Eosinophils, Absolute 0.31 10*3/mm3      Basophils, Absolute 0.11 10*3/mm3      Immature Grans, Absolute 0.03 10*3/mm3      nRBC 0.0 /100 WBC     Lipase [[id number]]  (Normal) Collected: 12/31/24 2340    Specimen: Blood Updated: 01/01/25 0012     Lipase 38 U/L     High Sensitivity Troponin T 1Hr [[id number]] Collected: 01/01/25 0119    Specimen: Blood Updated: 01/01/25 0138     HS Troponin T <6 ng/L      Troponin T Numeric Delta --     Comment: Unable to calculate.       Narrative:      High Sensitive Troponin T Reference Range:  <14.0 ng/L- Negative Female for AMI  <22.0 ng/L- Negative Male for AMI  >=14 - Abnormal Female indicating possible myocardial injury.  >=22 - Abnormal Male indicating possible myocardial injury.   Clinicians would have to utilize clinical acumen, EKG, Troponin, and serial changes to determine if it is an Acute Myocardial Infarction or myocardial injury due to an underlying chronic condition.         Urinalysis With Culture If Indicated - Urine, Clean Catch [[id number]]  (Abnormal) Collected: 01/01/25  0147    Specimen: Urine, Clean Catch Updated: 01/01/25 0155     Color, UA Yellow     Appearance, UA Clear     pH, UA 6.5     Specific Gravity, UA 1.022     Glucose, UA Negative     Ketones, UA Negative     Bilirubin, UA Negative     Blood, UA Moderate (2+)     Protein, UA Negative     Leuk Esterase, UA Negative     Nitrite, UA Negative     Urobilinogen, UA 1.0 E.U./dL    Narrative:      In absence of clinical symptoms, the presence of pyuria, bacteria, and/or nitrites on the urinalysis result does not correlate with infection.    Urinalysis, Microscopic Only - Urine, Clean Catch [[id number]]  (Abnormal) Collected: 01/01/25 0147    Specimen: Urine, Clean Catch Updated: 01/01/25 0155     RBC, UA 21-50 /HPF      WBC, UA 0-2 /HPF      Comment: Urine culture not indicated.        Bacteria, UA None Seen /HPF      Squamous Epithelial Cells, UA 0-2 /HPF      Hyaline Casts, UA 0-2 /LPF      Methodology Automated Microscopy             Imaging:    CT Abdomen Pelvis With Contrast    Result Date: 1/1/2025  CT ABDOMEN PELVIS W CONTRAST-  Date of exam: 1/1/2025 12:48 AM  Indication: Recent umbilical hernia repair.  Abdominal/pelvic pain, not otherwise specified.  Comparisons: 12/15/2024; 12/13/2024; 12/11/2024.  TECHNIQUE: Axial CT images were obtained of the abdomen and pelvis following the uneventful intravenous administration of 100 mL (or less) of Isovue-370 contrast agent. Reconstructed 2D (two-dimensional) coronal and sagittal images were also obtained. Automated exposure control and iterative construction methods were used. Additionally, the radiation dose reduction device was turned on for each scan per the ALARA (As Low as Reasonably Achievable) protocol. No oral contrast agent was administered for the study. Please see the electronic medical record, EMR (i.e., Epic), for the documented dose of intravenous contrast agent as well as the radiation dose.  FINDINGS: There has been interval surgical ventral hernia repair at  the level of the umbilicus. A small fluid collection is seen in the anterior subcutaneous tissues and measures approximately 2.3 x 1.9 x 3 cm in transverse, anteroposterior (AP), and craniocaudal extent, respectively, as seen on image 93 of series 8, image 112 of series 10, image 9 of series 9, and adjacent images. It has an estimated total volume of 6.8 mL. The CT number for the fluid is about 0 Hounsfield units (HU). A tiny gas bubble is seen just superficial to this fluid collection along its left anterior aspect, as on image 94 of series 8. These findings may represent normal postoperative findings (such as a hematoma or seroma with minimal residual ectopic gas, related to the surgery). However, an infected fluid collection cannot be excluded. There is also fluid identified just subjacent to the mesh graft repair, probably in the posterior aspect of the rectus sheath, with a CT number of about 20 Hounsfield units (HU) or slightly less, as seen on image 91 of series 8 and adjacent images. This fluid collection measures approximately 3.3 x 0.8 x 5.7 cm in transverse, anteroposterior (AP), and craniocaudal extent, respectively as seen on image 96 of series 8 and image 109 of series 10. It has a total estimated volume of 7.2 mL. It does not contain ectopic gas. Consider close interval clinical, lab, and imaging follow-up these findings to ensure a benign progression and to exclude a complication, such as infected fluid collections (as with abscesses). There is increased attenuation within the subcutaneous fat of the anterior midline abdominal wall, which may represent normal edema and/or granulation tissue. Infectious/inflammatory cellulitis cannot be excluded. Please correlate clinically. There is no evidence of recurrent ventral hernia by the CT study. Otherwise, no significant interval change is seen since prior studies. No significant acute findings are appreciated.       There is been interval surgical repair  "of an umbilical hernia with two (2) small fluid collections identified in the anterior abdominal wall and subjacent to the mesh graft repair within the posterior rectus sheath, as detailed above. They may represent normal postoperative collections. Infected fluid collections cannot be excluded. Consider close interval clinical, lab, and imaging follow-up. Please see above comments for further detail.   Portions of this note were completed with a voice recognition program.  Electronically Signed By-Sarwat Bonilla MD On:1/1/2025 1:41 AM      CT Angiogram Chest Pulmonary Embolism    Result Date: 1/1/2025  CT ANGIOGRAM CHEST PULMONARY EMBOLISM-  Date of exam: 1/1/2025, 1:14 A.M.  Indications: Dyspnea; recent surgery.  Comparisons: 12/31/2024; 12/11/2024.  TECHNIQUE: Axial CT images were obtained of the chest after the uneventful intravenous administration of 100 mL (or less) of Isovue-370 nonionic contrast agent. Reconstructed 2D coronal and sagittal images were also obtained. Automated exposure control and iterative construction methods were used. A 3D \"radial range\" reformation is provided. Please see the EMR (i.e., [facility]) for the documented dose of intravenous contrast agent as well as the radiation dose.  FINDINGS: LUNGS: No acute infiltrate. No suspicious pulmonary nodules. Mild emphysematous changes involve the lungs. A benign 9 mm lingular pulmonary cyst is seen, as on image 133 of series 6 and adjacent images. This finding was seen previously and is of doubtful clinical significance. VASCULATURE: No pulmonary embolism. No coronary artery calcifications. MEREDITH: No enlarged hilar lymph nodes. MEDIASTINUM: No enlarged mediastinal lymph nodes. CARDIAC: No cardiac enlargement. No pericardial effusion. AORTA: No thoracic aortic aneurysm or dissection. PLEURA: No pleural effusion. No pneumothorax. CHEST WALL: No mass or axillary adenopathy. No subcutaneous emphysema. No focal fluid collection. LIMITED ABDOMEN: No acute " findings are seen in the partially imaged upper abdomen. There is suspected diffuse hepatic steatosis with hepatomegaly. BONES: No acute fracture. No aggressive osseous lesion. Degenerative changes involve the imaged spine. OTHER: The central tracheobronchial tree is well aerated without filling defect. Diffuse prominence of the thyroid gland is suggested by CT and is nonspecific. A multinodular goiter would be in the differential diagnosis.       No pulmonary embolism. No thoracic aortic aneurysm or dissection. No acute infiltrate. Please see above comments for further detail.   Portions of this note were completed with a voice recognition program.  Electronically Signed By-Sarwat Bonilla MD On:1/1/2025 1:28 AM      XR Chest 1 View    Result Date: 12/31/2024  XR CHEST 1 VW Date of exam: 12/31/2024, 10:15 P.M., EST. Indication:  SOA/SOB/shortness of air/shortness of breath Comparison: 12/11/2024. FINDINGS: A single frontal (AP or PA upright portable) chest radiograph reveals no cardiac enlargement and no acute infiltrate. No pneumothorax is seen. No pleural effusion. External artifacts obscure detail.     No acute cardiopulmonary disease is seen radiographically.    Portions of this note were completed with a voice recognition program. Electronically Signed: Sarwat Bonilla MD  12/31/2024 10:34 PM EST  Workstation ID: [id number]       Differential Diagnosis and Discussion:    Abdominal Pain: Based on the patient's signs and symptoms, I considered abdominal aortic aneurysm, small bowel obstruction, pancreatitis, acute cholecystitis, acute appendecitis, peptic ulcer disease, gastritis, colitis, endocrine disorders, irritable bowel syndrome and other differential diagnosis an etiology of the patient's abdominal pain.    PROCEDURES:    Labs were collected in the emergency department and all labs were reviewed and interpreted by me.  X-ray were performed in the emergency department and all X-ray impressions were  independently interpreted by me.  An EKG was performed and the EKG was interpreted by me.  CT scan was performed in the emergency department and the CT scan radiology impression was interpreted by me.    ECG 12 Lead ED Triage Standing Order; SOA   Preliminary Result   HEART BFIZ=982  bpm   RR Nkgicqai=821  ms   VT Yspwpome=304  ms   P Horizontal Axis=-32  deg   P Front Axis=71  deg   QRSD Interval=78  ms   QT Jqlckduj=554  ms   QItH=808  ms   QRS Axis=-47  deg   T Wave Axis=62  deg   - ABNORMAL ECG -   Sinus tachycardia   Inferior infarct, old   Date and Time of Study:2025-01-01 01:26:10          Procedures    MDM                     Patient Care Considerations:    CONSULT: I considered consulting general surgery, however the patient is not septic.  White blood cell count is normal.  CT is not definitive for abscess.      Consultants/Shared Management Plan:    None    Social Determinants of Health:    Patient is independent, reliable, and has access to care.       Disposition and Care Coordination:    Discharged: The patient is suitable and stable for discharge with no need for consideration of admission.    I have explained the patient´s condition, diagnoses and treatment plan based on the information available to me at this time. I have answered questions and addressed any concerns. The patient has a good  understanding of the patient´s diagnosis, condition, and treatment plan as can be expected at this point. The vital signs have been stable. The patient´s condition is stable and appropriate for discharge from the emergency department.      The patient will pursue further outpatient evaluation with the primary care physician or other designated or consulting physician as outlined in the discharge instructions. They are agreeable to this plan of care and follow-up instructions have been explained in detail. The patient has received these instructions in written format and has expressed an understanding of the  discharge instructions. The patient is aware that any significant change in condition or worsening of symptoms should prompt an immediate return to this or the closest emergency department or call to 911.    Final diagnoses:   Abdominal pain, unspecified abdominal location        ED Disposition       ED Disposition   Discharge    Condition   Stable    Comment   --               This medical record created using voice recognition software.             Waldemar Rush MD  01/01/25 4800

## 2025-01-01 NOTE — ED TRIAGE NOTES
Pt presents to ED c/o post op problem on abdomen for hernia removal on Dec 19th. Per pt, pt is endorsing SOB with nausea. Pt reports pain is in private area and move up into abdomen and bilateral flank/back. Pt reports pain and burning upon urination. Denies chest pain, vaginal bleeding/discharge, fever. Respirs even and unlabored. Skin warm and dry. A&Ox4. Pt appears in no acute distress at this time.

## 2025-01-03 ENCOUNTER — OFFICE VISIT (OUTPATIENT)
Dept: SURGERY | Facility: CLINIC | Age: 50
End: 2025-01-03
Payer: COMMERCIAL

## 2025-01-03 VITALS — HEIGHT: 62 IN | BODY MASS INDEX: 33.49 KG/M2 | WEIGHT: 182 LBS

## 2025-01-03 DIAGNOSIS — K42.9 UMBILICAL HERNIA WITHOUT OBSTRUCTION AND WITHOUT GANGRENE: Primary | ICD-10-CM

## 2025-01-03 PROCEDURE — 99024 POSTOP FOLLOW-UP VISIT: CPT | Performed by: SURGERY

## 2025-01-03 NOTE — PROGRESS NOTES
Chief Complaint  Follow-up (Umbilical hernia without obstruction and without gangrene./PT was seen at [facility] on 12/31/2024 for suprapubic pain.)    Subjective          Velma Andres presents to [facility] GENERAL SURGERY  History of Present Illness    Velma Andres is a 49 y.o. female  who presents today for a postoperative visit.     Patient is here for a follow-up after a recent robotic umbilical hernia repair.  Her surgery was notable for a significant amount of necrotic fat out in the hernia sac that I removed during the procedure.  That hernia sac appeared secondarily infected so after we did the robotic component of the case I made a counterincision and excised that hernia sac.  She is doing okay today.    Past History:  Medical History: has a past medical history of Anxiety, Cervix cancer, Hernia of abdominal wall, Kidney stone, Thyroid condition, and Umbilical hernia.   Surgical History: has a past surgical history that includes Leg Surgery; Kidney stone surgery; and Ventral hernia repair (N/A, 12/19/2024).   Family History: family history is not on file.   Social History: reports that she has been smoking cigarettes. She has never used smokeless tobacco. She reports that she does not currently use alcohol. She reports that she does not use drugs.  Allergies: Ct contrast, Tramadol, and Ibuprofen       Current Outpatient Medications:     ALPRAZolam (XANAX) 2 MG tablet, TAKE ONE TABLET THREE TIMES DAILY AS NEEDED, Disp: , Rfl:     busPIRone (BUSPAR) 10 MG tablet, Take 1 tablet by mouth Every 12 (Twelve) Hours., Disp: , Rfl:     cephalexin (KEFLEX) 500 MG capsule, Take 1 capsule by mouth 3 (Three) Times a Day., Disp: 21 capsule, Rfl: 0    cloNIDine (CATAPRES) 0.1 MG tablet, Take 1 tablet by mouth Every 12 (Twelve) Hours., Disp: , Rfl:     cyclobenzaprine (FLEXERIL) 5 MG tablet, Take 1 tablet by mouth 3 (Three) Times a Day As Needed., Disp: , Rfl:     dicyclomine (BENTYL) 20 MG tablet,  "Take 1 tablet by mouth Every 6 (Six) Hours As Needed for Abdominal Cramping., Disp: 12 tablet, Rfl: 0    docusate sodium (COLACE) 100 MG capsule, Take 1 capsule by mouth 2 (Two) Times a Day., Disp: 10 capsule, Rfl: 0    gabapentin (NEURONTIN) 600 MG tablet, Take 1 tablet by mouth 2 (Two) Times a Day., Disp: , Rfl:     HYDROcodone-acetaminophen (NORCO) 5-325 MG per tablet, Take 1 tablet by mouth Every 6 (Six) Hours As Needed for Moderate Pain (Pain)., Disp: 10 tablet, Rfl: 0    hydrOXYzine (ATARAX) 50 MG tablet, Take 1 tablet by mouth 2 (Two) Times a Day As Needed., Disp: , Rfl:     levothyroxine (SYNTHROID, LEVOTHROID) 100 MCG tablet, Take 1 tablet by mouth Every Night., Disp: , Rfl:     ondansetron ODT (ZOFRAN-ODT) 4 MG disintegrating tablet, Place 1 tablet on the tongue Every 6 (Six) Hours As Needed for Nausea or Vomiting., Disp: 15 tablet, Rfl: 0    oxyCODONE-acetaminophen (Percocet) 5-325 MG per tablet, Take 1 tablet by mouth Every 6 (Six) Hours As Needed for Moderate Pain or Severe Pain (pain)., Disp: 10 tablet, Rfl: 0    oxyCODONE-acetaminophen (PERCOCET) 7.5-325 MG per tablet, Take 1 tablet by mouth Every 6 (Six) Hours As Needed for Severe Pain., Disp: 12 tablet, Rfl: 0    venlafaxine XR (EFFEXOR-XR) 150 MG 24 hr capsule, Take 2 capsules by mouth Daily As Needed., Disp: , Rfl:        Physical Exam  Her incision looks good and I went ahead and removed her nylon sutures and Steri-Stripped that incision  Objective     Vital Signs:   Ht 157.5 cm (62\")   Wt 82.6 kg (182 lb)   BMI 33.29 kg/m²              Assessment and Plan    Diagnoses and all orders for this visit:    1. Umbilical hernia without obstruction and without gangrene (Primary)    I will see her back in 2 weeks for further follow-up.      "

## 2025-01-03 NOTE — LETTER
January 3, 2025     Jignesh Doe  [address] [address]  [place] [address]    Patient: Velma Andres   YOB: 1975   Date of Visit: 1/3/2025     Dear Jignesh Doe:       Thank you for referring Velma Andres to me for evaluation. Below are the relevant portions of my assessment and plan of care.    If you have questions, please do not hesitate to call me. I look forward to following Velma along with you.         Sincerely,        Devin Abdi MD        CC: No Recipients    Devin Abdi MD  01/03/25 1112  Sign when Signing Visit  Chief Complaint  Follow-up (Umbilical hernia without obstruction and without gangrene./PT was seen at [facility] ER on 12/31/2024 for suprapubic pain.)    Subjective    {Problem List  Visit Diagnosis   Encounters  Notes  Medications  Labs  Result Review Imaging  Media :23}     Velma Andres presents to [facility] GENERAL SURGERY  History of Present Illness    Velma Andres is a 49 y.o. female  who presents today for a postoperative visit.     Patient is here for a follow-up after a recent robotic umbilical hernia repair.  Her surgery was notable for a significant amount of necrotic fat out in the hernia sac that I removed during the procedure.  That hernia sac appeared secondarily infected so after we did the robotic component of the case I made a counterincision and excised that hernia sac.  She is doing okay today.    Past History:  Medical History: has a past medical history of Anxiety, Cervix cancer, Hernia of abdominal wall, Kidney stone, Thyroid condition, and Umbilical hernia.   Surgical History: has a past surgical history that includes Leg Surgery; Kidney stone surgery; and Ventral hernia repair (N/A, 12/19/2024).   Family History: family history is not on file.   Social History: reports that she has been smoking cigarettes. She has never used smokeless tobacco. She reports that she does not currently use alcohol. She reports that  she does not use drugs.  Allergies: Ct contrast, Tramadol, and Ibuprofen       Current Outpatient Medications:   •  ALPRAZolam (XANAX) 2 MG tablet, TAKE ONE TABLET THREE TIMES DAILY AS NEEDED, Disp: , Rfl:   •  busPIRone (BUSPAR) 10 MG tablet, Take 1 tablet by mouth Every 12 (Twelve) Hours., Disp: , Rfl:   •  cephalexin (KEFLEX) 500 MG capsule, Take 1 capsule by mouth 3 (Three) Times a Day., Disp: 21 capsule, Rfl: 0  •  cloNIDine (CATAPRES) 0.1 MG tablet, Take 1 tablet by mouth Every 12 (Twelve) Hours., Disp: , Rfl:   •  cyclobenzaprine (FLEXERIL) 5 MG tablet, Take 1 tablet by mouth 3 (Three) Times a Day As Needed., Disp: , Rfl:   •  dicyclomine (BENTYL) 20 MG tablet, Take 1 tablet by mouth Every 6 (Six) Hours As Needed for Abdominal Cramping., Disp: 12 tablet, Rfl: 0  •  docusate sodium (COLACE) 100 MG capsule, Take 1 capsule by mouth 2 (Two) Times a Day., Disp: 10 capsule, Rfl: 0  •  gabapentin (NEURONTIN) 600 MG tablet, Take 1 tablet by mouth 2 (Two) Times a Day., Disp: , Rfl:   •  HYDROcodone-acetaminophen (NORCO) 5-325 MG per tablet, Take 1 tablet by mouth Every 6 (Six) Hours As Needed for Moderate Pain (Pain)., Disp: 10 tablet, Rfl: 0  •  hydrOXYzine (ATARAX) 50 MG tablet, Take 1 tablet by mouth 2 (Two) Times a Day As Needed., Disp: , Rfl:   •  levothyroxine (SYNTHROID, LEVOTHROID) 100 MCG tablet, Take 1 tablet by mouth Every Night., Disp: , Rfl:   •  ondansetron ODT (ZOFRAN-ODT) 4 MG disintegrating tablet, Place 1 tablet on the tongue Every 6 (Six) Hours As Needed for Nausea or Vomiting., Disp: 15 tablet, Rfl: 0  •  oxyCODONE-acetaminophen (Percocet) 5-325 MG per tablet, Take 1 tablet by mouth Every 6 (Six) Hours As Needed for Moderate Pain or Severe Pain (pain)., Disp: 10 tablet, Rfl: 0  •  oxyCODONE-acetaminophen (PERCOCET) 7.5-325 MG per tablet, Take 1 tablet by mouth Every 6 (Six) Hours As Needed for Severe Pain., Disp: 12 tablet, Rfl: 0  •  venlafaxine XR (EFFEXOR-XR) 150 MG 24 hr capsule, Take 2  "capsules by mouth Daily As Needed., Disp: , Rfl:        Physical Exam  Her incision looks good and I went ahead and removed her nylon sutures and Steri-Stripped that incision  Objective    Vital Signs:   Ht 157.5 cm (62\")   Wt 82.6 kg (182 lb)   BMI 33.29 kg/m²              Assessment and Plan {CC Problem List  Visit Diagnosis   ROS  Review (Popup)  Health Maintenance  Quality  BestPractice  Medications  SmartSets  SnapShot Encounters  Media :23}   Diagnoses and all orders for this visit:    1. Umbilical hernia without obstruction and without gangrene (Primary)    I will see her back in 2 weeks for further follow-up.  {Time Spent (Optional):74691}    "

## 2025-01-06 DIAGNOSIS — K42.9 UMBILICAL HERNIA WITHOUT OBSTRUCTION AND WITHOUT GANGRENE: ICD-10-CM

## 2025-01-07 RX ORDER — OXYCODONE AND ACETAMINOPHEN 5; 325 MG/1; MG/1
1 TABLET ORAL EVERY 6 HOURS PRN
Qty: 10 TABLET | Refills: 0 | OUTPATIENT
Start: 2025-01-07

## 2025-01-13 LAB
QT INTERVAL: 348 MS
QTC INTERVAL: 481 MS

## 2025-01-17 ENCOUNTER — OFFICE VISIT (OUTPATIENT)
Dept: SURGERY | Facility: CLINIC | Age: 50
End: 2025-01-17
Payer: MEDICARE

## 2025-01-17 VITALS — HEIGHT: 62 IN | BODY MASS INDEX: 32.94 KG/M2 | RESPIRATION RATE: 16 BRPM | WEIGHT: 179 LBS

## 2025-01-17 DIAGNOSIS — K42.9 UMBILICAL HERNIA WITHOUT OBSTRUCTION AND WITHOUT GANGRENE: Primary | ICD-10-CM

## 2025-01-17 PROCEDURE — 99024 POSTOP FOLLOW-UP VISIT: CPT | Performed by: SURGERY

## 2025-01-17 RX ORDER — MIRTAZAPINE 45 MG/1
TABLET, ORALLY DISINTEGRATING ORAL
COMMUNITY
Start: 2025-01-09

## 2025-01-17 RX ORDER — ARIPIPRAZOLE 15 MG/1
TABLET ORAL
COMMUNITY
Start: 2025-01-04

## 2025-01-17 NOTE — PROGRESS NOTES
Chief Complaint  Post-op Hernia    Subjective          Velma Andres presents to [facility] GENERAL SURGERY  History of Present Illness    Velma Andres is a 49 y.o. female  who presents today for a postoperative visit.     Patient is here for a follow-up after a robotic umbilical hernia repair.  She is still complaining of some pain from her left-sided port sites but otherwise she is doing better.    Past History:  Medical History: has a past medical history of Anxiety, Cervix cancer, Hernia of abdominal wall, Kidney stone, Thyroid condition, and Umbilical hernia.   Surgical History: has a past surgical history that includes Leg Surgery; Kidney stone surgery; and Ventral hernia repair (N/A, 12/19/2024).   Family History: family history is not on file.   Social History: reports that she has been smoking cigarettes. She has never used smokeless tobacco. She reports that she does not currently use alcohol. She reports that she does not use drugs.  Allergies: Ct contrast, Tramadol, and Ibuprofen       Current Outpatient Medications:     ALPRAZolam (XANAX) 2 MG tablet, TAKE ONE TABLET THREE TIMES DAILY AS NEEDED, Disp: , Rfl:     ARIPiprazole (ABILIFY) 15 MG tablet, take 1 tablet (15 mg) by mouth daily at bedtime, Disp: , Rfl:     busPIRone (BUSPAR) 10 MG tablet, Take 1 tablet by mouth Every 12 (Twelve) Hours., Disp: , Rfl:     cephalexin (KEFLEX) 500 MG capsule, Take 1 capsule by mouth 3 (Three) Times a Day., Disp: 21 capsule, Rfl: 0    cloNIDine (CATAPRES) 0.1 MG tablet, Take 1 tablet by mouth Every 12 (Twelve) Hours., Disp: , Rfl:     cyclobenzaprine (FLEXERIL) 5 MG tablet, Take 1 tablet by mouth 3 (Three) Times a Day As Needed., Disp: , Rfl:     dicyclomine (BENTYL) 20 MG tablet, Take 1 tablet by mouth Every 6 (Six) Hours As Needed for Abdominal Cramping., Disp: 12 tablet, Rfl: 0    docusate sodium (COLACE) 100 MG capsule, Take 1 capsule by mouth 2 (Two) Times a Day., Disp: 10 capsule, Rfl:  "0    FLUoxetine (PROzac) 20 MG capsule, Take 1 capsule by mouth Daily., Disp: , Rfl:     gabapentin (NEURONTIN) 600 MG tablet, Take 1 tablet by mouth 2 (Two) Times a Day., Disp: , Rfl:     HYDROcodone-acetaminophen (NORCO) 5-325 MG per tablet, Take 1 tablet by mouth Every 6 (Six) Hours As Needed for Moderate Pain (Pain)., Disp: 10 tablet, Rfl: 0    hydrOXYzine (ATARAX) 50 MG tablet, Take 1 tablet by mouth 2 (Two) Times a Day As Needed., Disp: , Rfl:     levothyroxine (SYNTHROID, LEVOTHROID) 100 MCG tablet, Take 1 tablet by mouth Every Night., Disp: , Rfl:     mirtazapine (REMERON SOL-TAB) 45 MG disintegrating tablet, PLACE 1 TABLET (45 MG) ON THE TONGUE AND ALLOW TO DISSOLVE DAILY AT BEDTIME, Disp: , Rfl:     ondansetron ODT (ZOFRAN-ODT) 4 MG disintegrating tablet, Place 1 tablet on the tongue Every 6 (Six) Hours As Needed for Nausea or Vomiting., Disp: 15 tablet, Rfl: 0    oxyCODONE-acetaminophen (Percocet) 5-325 MG per tablet, Take 1 tablet by mouth Every 6 (Six) Hours As Needed for Moderate Pain or Severe Pain (pain)., Disp: 10 tablet, Rfl: 0    oxyCODONE-acetaminophen (PERCOCET) 7.5-325 MG per tablet, Take 1 tablet by mouth Every 6 (Six) Hours As Needed for Severe Pain., Disp: 12 tablet, Rfl: 0    venlafaxine XR (EFFEXOR-XR) 150 MG 24 hr capsule, Take 2 capsules by mouth Daily As Needed., Disp: , Rfl:        Physical Exam  She looks fine today.  Her incisions are healing up fine with no evidence of infection.  Objective     Vital Signs:   Resp 16   Ht 157.5 cm (62.01\")   Wt 81.2 kg (179 lb)   BMI 32.73 kg/m²              Assessment and Plan    Diagnoses and all orders for this visit:    1. Umbilical hernia without obstruction and without gangrene (Primary)    Overall I think she is doing fine.  I am going to see her back on an as-needed basis.      "

## 2025-03-02 ENCOUNTER — APPOINTMENT (OUTPATIENT)
Dept: GENERAL RADIOLOGY | Facility: HOSPITAL | Age: 50
DRG: 948 | End: 2025-03-02
Payer: MEDICARE

## 2025-03-02 ENCOUNTER — APPOINTMENT (OUTPATIENT)
Dept: CT IMAGING | Facility: HOSPITAL | Age: 50
DRG: 948 | End: 2025-03-02
Payer: MEDICARE

## 2025-03-02 ENCOUNTER — HOSPITAL ENCOUNTER (INPATIENT)
Facility: HOSPITAL | Age: 50
LOS: 1 days | Discharge: LEFT BEFORE DISCHARGE | DRG: 948 | End: 2025-03-02
Attending: EMERGENCY MEDICINE | Admitting: HOSPITALIST
Payer: MEDICARE

## 2025-03-02 VITALS
SYSTOLIC BLOOD PRESSURE: 109 MMHG | WEIGHT: 178.57 LBS | HEIGHT: 63 IN | BODY MASS INDEX: 31.64 KG/M2 | OXYGEN SATURATION: 94 % | HEART RATE: 108 BPM | RESPIRATION RATE: 20 BRPM | TEMPERATURE: 98.2 F | DIASTOLIC BLOOD PRESSURE: 62 MMHG

## 2025-03-02 DIAGNOSIS — I63.9 CEREBROVASCULAR ACCIDENT (CVA), UNSPECIFIED MECHANISM: Primary | ICD-10-CM

## 2025-03-02 LAB
ABO GROUP BLD: NORMAL
ALBUMIN SERPL-MCNC: 4 G/DL (ref 3.5–5.2)
ALBUMIN/GLOB SERPL: 1 G/DL
ALP SERPL-CCNC: 101 U/L (ref 39–117)
ALT SERPL W P-5'-P-CCNC: 20 U/L (ref 1–33)
ANION GAP SERPL CALCULATED.3IONS-SCNC: 11.5 MMOL/L (ref 5–15)
APTT PPP: 28.3 SECONDS (ref 24.2–34.2)
AST SERPL-CCNC: 19 U/L (ref 1–32)
BASOPHILS # BLD AUTO: 0.08 10*3/MM3 (ref 0–0.2)
BASOPHILS NFR BLD AUTO: 1 % (ref 0–1.5)
BILIRUB SERPL-MCNC: 0.3 MG/DL (ref 0–1.2)
BLD GP AB SCN SERPL QL: NEGATIVE
BUN SERPL-MCNC: 11 MG/DL (ref 6–20)
BUN/CREAT SERPL: 10.3 (ref 7–25)
CALCIUM SPEC-SCNC: 9.5 MG/DL (ref 8.6–10.5)
CHLORIDE SERPL-SCNC: 103 MMOL/L (ref 98–107)
CO2 SERPL-SCNC: 24.5 MMOL/L (ref 22–29)
CREAT SERPL-MCNC: 1.07 MG/DL (ref 0.57–1)
DEPRECATED RDW RBC AUTO: 39.9 FL (ref 37–54)
EGFRCR SERPLBLD CKD-EPI 2021: 64.2 ML/MIN/1.73
EOSINOPHIL # BLD AUTO: 0.29 10*3/MM3 (ref 0–0.4)
EOSINOPHIL NFR BLD AUTO: 3.5 % (ref 0.3–6.2)
ERYTHROCYTE [DISTWIDTH] IN BLOOD BY AUTOMATED COUNT: 12.5 % (ref 12.3–15.4)
GLOBULIN UR ELPH-MCNC: 3.9 GM/DL
GLUCOSE BLDC GLUCOMTR-MCNC: 114 MG/DL (ref 70–99)
GLUCOSE SERPL-MCNC: 129 MG/DL (ref 65–99)
HCT VFR BLD AUTO: 42.6 % (ref 34–46.6)
HGB BLD-MCNC: 14.4 G/DL (ref 12–15.9)
HOLD SPECIMEN: NORMAL
HOLD SPECIMEN: NORMAL
IMM GRANULOCYTES # BLD AUTO: 0.02 10*3/MM3 (ref 0–0.05)
IMM GRANULOCYTES NFR BLD AUTO: 0.2 % (ref 0–0.5)
INR PPP: 1.03 (ref 0.86–1.15)
LYMPHOCYTES # BLD AUTO: 2.89 10*3/MM3 (ref 0.7–3.1)
LYMPHOCYTES NFR BLD AUTO: 34.5 % (ref 19.6–45.3)
MCH RBC QN AUTO: 29.4 PG (ref 26.6–33)
MCHC RBC AUTO-ENTMCNC: 33.8 G/DL (ref 31.5–35.7)
MCV RBC AUTO: 86.9 FL (ref 79–97)
MONOCYTES # BLD AUTO: 0.6 10*3/MM3 (ref 0.1–0.9)
MONOCYTES NFR BLD AUTO: 7.2 % (ref 5–12)
NEUTROPHILS NFR BLD AUTO: 4.49 10*3/MM3 (ref 1.7–7)
NEUTROPHILS NFR BLD AUTO: 53.6 % (ref 42.7–76)
NRBC BLD AUTO-RTO: 0 /100 WBC (ref 0–0.2)
PLATELET # BLD AUTO: 290 10*3/MM3 (ref 140–450)
PMV BLD AUTO: 9.4 FL (ref 6–12)
POTASSIUM SERPL-SCNC: 3.7 MMOL/L (ref 3.5–5.2)
PROT SERPL-MCNC: 7.9 G/DL (ref 6–8.5)
PROTHROMBIN TIME: 13.9 SECONDS (ref 11.8–14.9)
RBC # BLD AUTO: 4.9 10*6/MM3 (ref 3.77–5.28)
RH BLD: POSITIVE
SODIUM SERPL-SCNC: 139 MMOL/L (ref 136–145)
T&S EXPIRATION DATE: NORMAL
WBC NRBC COR # BLD AUTO: 8.37 10*3/MM3 (ref 3.4–10.8)
WHOLE BLOOD HOLD COAG: NORMAL
WHOLE BLOOD HOLD SPECIMEN: NORMAL

## 2025-03-02 PROCEDURE — 25510000001 IOPAMIDOL PER 1 ML: Performed by: EMERGENCY MEDICINE

## 2025-03-02 PROCEDURE — 96375 TX/PRO/DX INJ NEW DRUG ADDON: CPT

## 2025-03-02 PROCEDURE — 82948 REAGENT STRIP/BLOOD GLUCOSE: CPT | Performed by: EMERGENCY MEDICINE

## 2025-03-02 PROCEDURE — 25010000002 LORAZEPAM PER 2 MG: Performed by: EMERGENCY MEDICINE

## 2025-03-02 PROCEDURE — 94799 UNLISTED PULMONARY SVC/PX: CPT

## 2025-03-02 PROCEDURE — 70498 CT ANGIOGRAPHY NECK: CPT

## 2025-03-02 PROCEDURE — 85730 THROMBOPLASTIN TIME PARTIAL: CPT | Performed by: EMERGENCY MEDICINE

## 2025-03-02 PROCEDURE — 4A03X5D MEASUREMENT OF ARTERIAL FLOW, INTRACRANIAL, EXTERNAL APPROACH: ICD-10-PCS | Performed by: RADIOLOGY

## 2025-03-02 PROCEDURE — 99285 EMERGENCY DEPT VISIT HI MDM: CPT

## 2025-03-02 PROCEDURE — 86900 BLOOD TYPING SEROLOGIC ABO: CPT | Performed by: EMERGENCY MEDICINE

## 2025-03-02 PROCEDURE — 70450 CT HEAD/BRAIN W/O DYE: CPT

## 2025-03-02 PROCEDURE — 99222 1ST HOSP IP/OBS MODERATE 55: CPT | Performed by: HOSPITALIST

## 2025-03-02 PROCEDURE — 80053 COMPREHEN METABOLIC PANEL: CPT | Performed by: EMERGENCY MEDICINE

## 2025-03-02 PROCEDURE — 85610 PROTHROMBIN TIME: CPT | Performed by: EMERGENCY MEDICINE

## 2025-03-02 PROCEDURE — 85025 COMPLETE CBC W/AUTO DIFF WBC: CPT | Performed by: EMERGENCY MEDICINE

## 2025-03-02 PROCEDURE — 99222 1ST HOSP IP/OBS MODERATE 55: CPT | Performed by: STUDENT IN AN ORGANIZED HEALTH CARE EDUCATION/TRAINING PROGRAM

## 2025-03-02 PROCEDURE — 94640 AIRWAY INHALATION TREATMENT: CPT

## 2025-03-02 PROCEDURE — 71045 X-RAY EXAM CHEST 1 VIEW: CPT

## 2025-03-02 PROCEDURE — 25010000002 METHYLPREDNISOLONE PER 125 MG: Performed by: EMERGENCY MEDICINE

## 2025-03-02 PROCEDURE — 36415 COLL VENOUS BLD VENIPUNCTURE: CPT

## 2025-03-02 PROCEDURE — 96374 THER/PROPH/DIAG INJ IV PUSH: CPT

## 2025-03-02 PROCEDURE — 70496 CT ANGIOGRAPHY HEAD: CPT

## 2025-03-02 PROCEDURE — 86850 RBC ANTIBODY SCREEN: CPT | Performed by: EMERGENCY MEDICINE

## 2025-03-02 PROCEDURE — 86901 BLOOD TYPING SEROLOGIC RH(D): CPT | Performed by: EMERGENCY MEDICINE

## 2025-03-02 RX ORDER — IPRATROPIUM BROMIDE AND ALBUTEROL SULFATE 2.5; .5 MG/3ML; MG/3ML
3 SOLUTION RESPIRATORY (INHALATION)
Status: COMPLETED | OUTPATIENT
Start: 2025-03-02 | End: 2025-03-02

## 2025-03-02 RX ORDER — LORAZEPAM 2 MG/ML
1 INJECTION INTRAMUSCULAR ONCE
Status: COMPLETED | OUTPATIENT
Start: 2025-03-02 | End: 2025-03-02

## 2025-03-02 RX ORDER — ASPIRIN 325 MG
325 TABLET ORAL DAILY
Status: CANCELLED | OUTPATIENT
Start: 2025-03-03

## 2025-03-02 RX ORDER — ACETAMINOPHEN 325 MG/1
650 TABLET ORAL EVERY 4 HOURS PRN
Status: CANCELLED | OUTPATIENT
Start: 2025-03-02

## 2025-03-02 RX ORDER — DIPHENHYDRAMINE HYDROCHLORIDE 50 MG/ML
25 INJECTION INTRAMUSCULAR; INTRAVENOUS ONCE
Status: DISCONTINUED | OUTPATIENT
Start: 2025-03-02 | End: 2025-03-02 | Stop reason: HOSPADM

## 2025-03-02 RX ORDER — SODIUM CHLORIDE 9 MG/ML
40 INJECTION, SOLUTION INTRAVENOUS AS NEEDED
Status: CANCELLED | OUTPATIENT
Start: 2025-03-02

## 2025-03-02 RX ORDER — ASPIRIN 300 MG/1
300 SUPPOSITORY RECTAL DAILY
Status: CANCELLED | OUTPATIENT
Start: 2025-03-03

## 2025-03-02 RX ORDER — ACETAMINOPHEN 650 MG/1
650 SUPPOSITORY RECTAL EVERY 4 HOURS PRN
Status: CANCELLED | OUTPATIENT
Start: 2025-03-02

## 2025-03-02 RX ORDER — SODIUM CHLORIDE 0.9 % (FLUSH) 0.9 %
10 SYRINGE (ML) INJECTION AS NEEDED
Status: CANCELLED | OUTPATIENT
Start: 2025-03-02

## 2025-03-02 RX ORDER — SODIUM CHLORIDE 0.9 % (FLUSH) 0.9 %
10 SYRINGE (ML) INJECTION AS NEEDED
Status: DISCONTINUED | OUTPATIENT
Start: 2025-03-02 | End: 2025-03-02 | Stop reason: HOSPADM

## 2025-03-02 RX ORDER — IOPAMIDOL 755 MG/ML
100 INJECTION, SOLUTION INTRAVASCULAR
Status: COMPLETED | OUTPATIENT
Start: 2025-03-02 | End: 2025-03-02

## 2025-03-02 RX ORDER — ONDANSETRON 2 MG/ML
4 INJECTION INTRAMUSCULAR; INTRAVENOUS EVERY 6 HOURS PRN
Status: CANCELLED | OUTPATIENT
Start: 2025-03-02

## 2025-03-02 RX ORDER — SODIUM CHLORIDE 0.9 % (FLUSH) 0.9 %
10 SYRINGE (ML) INJECTION EVERY 12 HOURS SCHEDULED
Status: CANCELLED | OUTPATIENT
Start: 2025-03-02

## 2025-03-02 RX ORDER — ATORVASTATIN CALCIUM 40 MG/1
80 TABLET, FILM COATED ORAL NIGHTLY
Status: CANCELLED | OUTPATIENT
Start: 2025-03-02

## 2025-03-02 RX ADMIN — IOPAMIDOL 100 ML: 755 INJECTION, SOLUTION INTRAVENOUS at 18:52

## 2025-03-02 RX ADMIN — IPRATROPIUM BROMIDE AND ALBUTEROL SULFATE 3 ML: .5; 3 SOLUTION RESPIRATORY (INHALATION) at 19:18

## 2025-03-02 RX ADMIN — LORAZEPAM 1 MG: 2 INJECTION INTRAMUSCULAR; INTRAVENOUS at 19:10

## 2025-03-02 RX ADMIN — IPRATROPIUM BROMIDE AND ALBUTEROL SULFATE 3 ML: .5; 3 SOLUTION RESPIRATORY (INHALATION) at 19:17

## 2025-03-02 RX ADMIN — METHYLPREDNISOLONE SODIUM SUCCINATE 125 MG: 125 INJECTION INTRAMUSCULAR; INTRAVENOUS at 19:08

## 2025-03-02 NOTE — Clinical Note
Level of Care: Progressive Care [20]  Diagnosis: Stroke [[id number]]  Admitting Physician: MK HARMON [[id number]]  Attending Physician: MK HARMON [[id number]]  Certification: I Certify That Inpatient Hospital Services Are Medically Necessary For Greate r Than 2 Midnights

## 2025-03-02 NOTE — ED PROVIDER NOTES
Time: 5:39 PM EST  Date of encounter:  3/2/2025  Independent Historian/Clinical History and Information was obtained by:   Patient    History is limited by: N/A    Chief complaint: Left-sided weakness and numbness    History of Present Illness:  Patient is a 50 y.o. year old female who presents to the emergency department for evaluation of left-sided weakness and numbness.  Patient states that it began upon awaking today.  Patient reports she woke around 9 AM.  Patient complains of feeling weak involving the left side of her body.  Patient also reports that she feels pins and needle sensation involving her left foot.  Patient endorses a history of prior stroke.  Patient reports the symptoms are new.  Patient denies headache.  Patient denies any chest pain or abdominal pain.      Patient Care Team  Primary Care Provider: Jignesh Doe    Past Medical History:     Allergies   Allergen Reactions    Ct Contrast Anaphylaxis    Tramadol Other (See Comments), Unknown (See Comments) and Headache     MIGRAINES    Ibuprofen Nausea And Vomiting and Other (See Comments)     ULCER    Other reaction(s): Nausea and vomiting   ULCER     Past Medical History:   Diagnosis Date    Anxiety     Cervix cancer     TREATED NO REOCCURANCE    Hernia of abdominal wall     Kidney stone     Thyroid condition     Umbilical hernia      Past Surgical History:   Procedure Laterality Date    KIDNEY STONE SURGERY      LEG SURGERY      vein repair    VENTRAL HERNIA REPAIR N/A 12/19/2024    Procedure: Robotic umbilical hernia repair-repair hernia at umbilicus with small incisions, camera and robotic instruments;  Surgeon: Devin Abdi MD;  Location: [facility] OR [facility] [place];  Service: Robotics - [facility];  Laterality: N/A;     Family History   Problem Relation Age of Onset    Malig Hyperthermia Neg Hx        Home Medications:  Prior to Admission medications    Medication Sig Start Date End Date Taking? Authorizing Provider   ALPRAZolam (XANAX) 2 MG tablet  TAKE ONE TABLET THREE TIMES DAILY AS NEEDED 10/11/24   Julissa Reed MD   ARIPiprazole (ABILIFY) 15 MG tablet take 1 tablet (15 mg) by mouth daily at bedtime 1/4/25   Julissa Reed MD   busPIRone (BUSPAR) 10 MG tablet Take 1 tablet by mouth Every 12 (Twelve) Hours. 9/12/24   Julissa Reed MD   cephalexin (KEFLEX) 500 MG capsule Take 1 capsule by mouth 3 (Three) Times a Day. 12/11/24   Graham Heath MD   cloNIDine (CATAPRES) 0.1 MG tablet Take 1 tablet by mouth Every 12 (Twelve) Hours. 10/11/24   Julissa Reed MD   cyclobenzaprine (FLEXERIL) 5 MG tablet Take 1 tablet by mouth 3 (Three) Times a Day As Needed. 9/1/24   Julissa Reed MD   dicyclomine (BENTYL) 20 MG tablet Take 1 tablet by mouth Every 6 (Six) Hours As Needed for Abdominal Cramping. 1/1/25   Waldemar Rush MD   docusate sodium (COLACE) 100 MG capsule Take 1 capsule by mouth 2 (Two) Times a Day. 12/15/24   Reynold Álvarez PA-C   FLUoxetine (PROzac) 20 MG capsule Take 1 capsule by mouth Daily. 1/7/25   Julissa Reed MD   gabapentin (NEURONTIN) 600 MG tablet Take 1 tablet by mouth 2 (Two) Times a Day.    Julissa Reed MD   HYDROcodone-acetaminophen (NORCO) 5-325 MG per tablet Take 1 tablet by mouth Every 6 (Six) Hours As Needed for Moderate Pain (Pain). 12/19/24   Devin Abdi MD   hydrOXYzine (ATARAX) 50 MG tablet Take 1 tablet by mouth 2 (Two) Times a Day As Needed. 10/11/24   Julissa Reed MD   levothyroxine (SYNTHROID, LEVOTHROID) 100 MCG tablet Take 1 tablet by mouth Every Night. 8/30/24   Julissa Reed MD   mirtazapine (REMERON SOL-TAB) 45 MG disintegrating tablet PLACE 1 TABLET (45 MG) ON THE TONGUE AND ALLOW TO DISSOLVE DAILY AT BEDTIME 1/9/25   Julissa Reed MD   ondansetron ODT (ZOFRAN-ODT) 4 MG disintegrating tablet Place 1 tablet on the tongue Every 6 (Six) Hours As Needed for Nausea or Vomiting. 1/1/25   Waldemar Rush MD  "  oxyCODONE-acetaminophen (Percocet) 5-325 MG per tablet Take 1 tablet by mouth Every 6 (Six) Hours As Needed for Moderate Pain or Severe Pain (pain). 12/26/24   Devin Abdi MD   oxyCODONE-acetaminophen (PERCOCET) 7.5-325 MG per tablet Take 1 tablet by mouth Every 6 (Six) Hours As Needed for Severe Pain. 12/13/24   Adithya Plaza MD   venlafaxine XR (EFFEXOR-XR) 150 MG 24 hr capsule Take 2 capsules by mouth Daily As Needed.    Provider, Julissa, MD        Social History:   Social History     Tobacco Use    Smoking status: Every Day     Current packs/day: 1.00     Types: Cigarettes    Smokeless tobacco: Never    Tobacco comments:     INST PER ANESTHESIA PROTOCOL   Vaping Use    Vaping status: Never Used   Substance Use Topics    Alcohol use: Not Currently    Drug use: Never         Review of Systems:  Review of Systems   Constitutional:  Negative for chills and fever.   HENT:  Negative for congestion, ear pain and sore throat.    Eyes:  Negative for pain.   Respiratory:  Negative for cough, chest tightness and shortness of breath.    Cardiovascular:  Negative for chest pain.   Gastrointestinal:  Negative for abdominal pain, diarrhea, nausea and vomiting.   Genitourinary:  Negative for flank pain and hematuria.   Musculoskeletal:  Negative for joint swelling.   Skin:  Negative for pallor.   Neurological:  Positive for weakness and numbness. Negative for seizures and headaches.   All other systems reviewed and are negative.       Physical Exam:  /62   Pulse 108   Temp 98.2 °F (36.8 °C) (Oral)   Resp 20   Ht 160 cm (63\")   Wt 81 kg (178 lb 9.2 oz)   SpO2 94%   BMI 31.63 kg/m²   Vital signs were reviewed under triage note.  General appearance - Patient appears well-developed and well-nourished.  Patient is in no acute distress.  Head - Normocephalic, atraumatic.  Pupils - Equal, round, reactive to light.  Extraocular muscles are intact.  Conjunctiva is clear.  Nasal - Normal inspection.  No " evidence of trauma or epistaxis.  Tympanic membranes - Gray, intact without erythema or retractions.  Oral mucosa - Pink and moist without lesions or erythema.  Uvula is midline.  Chest wall - Atraumatic.  Chest wall is nontender.  There are no vesicular rashes noted.  Neck - Supple.  Trachea was midline.  There is no palpable lymphadenopathy or thyromegaly.  There are no meningeal signs  Lungs - Clear to auscultation and percussion bilaterally.  Heart - Regular rate and rhythm without any murmurs, clicks, or gallops.  Abdomen - Soft.  Bowel sounds are present.  There is no palpable tenderness.  There is no rebound, guarding, or rigidity.  There are no palpable masses.  There are no pulsatile masses.  Back - Spine is straight and midline.  There is no CVA tenderness.  Extremities - Intact x4 with full range of motion.  There is no palpable edema.  Pulses are intact x4 and equal.  Neurologic - Patient is awake, alert, and oriented x3.  Cranial nerves II through XII are grossly intact.  Patient was noted to have a drift of her left upper extremity and left lower extremity.  Additionally the patient reports abnormal sensation to the left side of her body.  Integument - There are no rashes.  There are no petechia or purpura lesions noted.  There are no vesicular lesions noted.         Medical Decision Making:      Comorbidities that affect care:    Hypothyroidism, cervical cancer, anxiety, kidney stones, stroke    External Notes reviewed:    Previous Clinic Note: Office visit with Dr. Abdi on 1/17/2025 was reviewed by me.      The following orders were placed and all results were independently analyzed by me:  Orders Placed This Encounter   Procedures    CT Head Without Contrast Stroke Protocol    CT Angiogram Head w AI Analysis of LVO    XR Chest 1 View    CT Angiogram Neck    [place] Draw    Comprehensive Metabolic Panel    Protime-INR    aPTT    CBC Auto Differential    Measure Actual Weight    Undress and Gown     Continuous Pulse Oximetry    Vital Signs    Notify Provider for SBP <80 or >200    Notify Provider for SBP >140 (For Hemorrhagic Stroke)    Nursing Dysphagia Screening (Complete Prior to Giving anything PO)    POC Glucose Once    Type & Screen    Inpatient Admission    CBC & Differential    Green Top (Gel)    Lavender Top    Gold Top - SST    Light Blue Top       Medications Given in the Emergency Department:  Medications   iopamidol (ISOVUE-370) 76 % injection 100 mL (100 mL Intravenous Given 3/2/25 1852)   methylPREDNISolone sodium succinate (SOLU-Medrol) 125 mg in sterile water (preservative free) 2 mL (125 mg Intravenous Given 3/2/25 1908)   LORazepam (ATIVAN) injection 1 mg (1 mg Intravenous Given 3/2/25 1910)   ipratropium-albuterol (DUO-NEB) nebulizer solution 3 mL (3 mL Nebulization Given 3/2/25 1918)        ED Course:    The patient was initially evaluated in the triage area where orders were placed. The patient was later dispositioned by Devin Hernandez DO.      The patient was advised to stay for completion of workup which includes but is not limited to communication of labs and radiological results, reassessment and plan. The patient was advised that leaving prior to disposition by a provider could result in critical findings that are not communicated to the patient.     ED Course as of 03/04/25 2108   Sun Mar 02, 2025   1146 PROVIDER IN TRIAGE  Patient was evaluated by me in triage, Bailey Seaver, APRN, ANAYELI-HOSSEIN.  Orders were placed and patient is currently awaiting final results and disposition.   [AS]      ED Course User Index  [AS] Seaver, Alyce B, APRN       The patient was seen and evaluated in the ED by me.  The above history and physical examination was performed as documented.  Stroke alert was activated in triage.  I met the patient in the hallway outside the CT scanner went into the CT table to be empty to begin imaging.  A teleneurology consult was obtained.  Recommendations were to the patient  for possible stroke symptoms and further workup.  The patient was not a thrombolytic candidate as she woke up with her symptoms and her symptoms were more than 4.5 hours.  Additionally in the ED workup did not show any evidence of LVO.  Patient was agreeable to being admitted.  While waiting for a bed upstairs the patient became unruly and verbally aggressive.  Patient was requesting multiple medicines.  The patient eventually said that she was going to leave after earlier agreed to be admitted.  The patient understands of the concerns for stroke and need for further workup.  Patient signed out AGAINST MEDICAL ADVICE.  Patient left prior to him [facility] back in and talk to her and discuss further workup and treatment.    Labs:    Lab Results (last 24 hours)       ** No results found for the last 24 hours. **             Imaging:    No Radiology Exams Resulted Within Past 24 Hours      Differential Diagnosis and Discussion:      Stroke: Differential diagnosis in this patient with signs of possible ischemic stroke include TIA or ischemic stroke, hemorrhagic stroke, hypoglycemic episode, toxic or metabolic encephalopathy, paresthesias.    PROCEDURES:    Labs were collected in the emergency department and all labs were reviewed and interpreted by me.  X-ray were performed in the emergency department and all X-ray impressions were independently interpreted by me.  CT scan was performed in the emergency department and the CT scan radiology impression was interpreted by me.    No orders to display        Procedures    MDM                   Patient Care Considerations:    Thrombolysis was considered however the patient presents with wake-up symptoms as well as intermittent times she will get up to the time she presents to the ER experiencing 4.5 hours.      Consultants/Shared Management Plan:    Hospitalist: I have discussed the case with Dr. Pelaez who agrees to accept the patient for admission.  Consultant: I have  discussed the case with teleneurology who agrees to consult on the patient.    Social Determinants of Health:    Patient is independent, reliable, and has access to care.       Disposition and Care Coordination:    Eloped: This patient has left the emergency department or waiting room with no communication to myself, nursing or administrative staff. There was no opportunity to discuss the patient's decision to leave, provide medical advice or discuss alternatives to. The staff has made efforts to locate patient without success.        Final diagnoses:   Cerebrovascular accident (CVA), unspecified mechanism        ED Disposition       ED Disposition   Eloped    Condition   --    Comment   Level of Care: Progressive Care [20]  Diagnosis: Stroke [360471]  Admitting Physician: MK HARMON [[id number]]  Attending Physician: MK HARMON [[id number]]  Certification: I Certify That Inpatient Hospital Services Are Medically Necessary For Amy r Than 2 Midnights                 This medical record created using voice recognition software.             Devin Hernandez DO  03/04/25 5029

## 2025-03-02 NOTE — CONSULTS
Addendum:  No O      [facility]  [facility] Tele[facility] Consult [facility]      Patient Name:   Velma Andres  YOB: 1975  Identification Number:   MRN - [id number]  Date of Service:   03/02/2025 17:44:01    Diagnosis:        I63.89 - Cerebrovascular accident (CVA) due to other mechanism ([facility])    Impression:       This is a 51 yo F w a PMHX of ?stroke?, cervical CA, who presents to the ED with the acute onset of L sided weakness and numbness. She was last known to be at baseline at bedtime last night. Woke up with symptoms. On arrival to the ED her symptoms remain persistent. BP was found to be 125 systolic.   Of note, she has had R sided deficits and L sided deficit in the past, throughout the years.            Physical exam with L hemiparesis and sensory loss   Labs pending   Imaging with CTH pending officila read                  R/o CVA   Consider unmasking of prior dormant stroke symptoms.   Consider exaccerbation by psychosomatic component as a daignosis of exclusion.               -If official CTH shows no bleed, then allow permissive HTN to 220/110 and below. ASA 81.   -MRI brain w/o con (inpatient)   -Head and neck vessel imaging   -2D echo   -UA, Utox, troponins, A1C, CBC, CMP, Lipids, LFTs, thiamine, ammonia, TSH, B12, ABG             Our recommendations are outlined below.    Recommendations:          Stroke/Telemetry Floor        Neuro Checks        Bedside Swallow Eval        DVT Prophylaxis        IV Fluids, Normal Saline        Head of Bed 30 Degrees        Euglycemia and Avoid Hyperthermia (PRN Acetaminophen)        ------------------------------------------------------------------------------    Advanced Imaging:  Advanced Imaging Deferred because:    pending completion      Metrics:  Last Known Well: 03/01/2025 21:00:00  Dispatch Time: 03/02/2025 17:43:03  Arrival Time: 03/02/2025 17:24:00  Initial Response Time: 03/02/2025 17:49:54  Symptoms: L sided  weakness.  Initial patient interaction: 03/02/2025 17:51:59  NIHSS Assessment Completed: 03/02/2025 17:58:35  Patient is not a candidate for Thrombolytic.  Thrombolytic Medical Decision: 03/02/2025 17:58:37  Patient was not deemed candidate for Thrombolytic because of following reasons:  LKW outside 4.5 hr window. .    I personally Reviewed the CT Head and it Showed    Primary Provider Notified of Diagnostic Impression and Management Plan on: 03/02/2025 18:07:42        ------------------------------------------------------------------------------    History of Present Illness:  Patient is a 50 year old Female.    Patient was brought by EMS for symptoms of L sided weakness.  This is a 49 yo F w a PMHX of stroke, who presents to the ED with the acute onset of L sided weakness and numbness. She was last known to be at baseline at bedtime last night. Woke up with symptoms. On arrival to the ED her symptoms remain persistent. BP was found to be 125 systolic. She was taken for CTH and further evaluation.  Decision on whether or not to give pharmacological thrombolysis was made based on indications, contraindications, and patient's disability status and preference.       Medications:    Anticoagulant use:  Unknown  Antiplatelet use: Unknown  Reviewed EMR for current medications    Allergies:   NKDA    Social History:  Smoking: No  Alcohol Use: No  Drug Use: No    Family History:    There is no family history of premature cerebrovascular disease pertinent to this consultation    ROS :  14 Points Review of Systems was performed and was negative except mentioned in HPI.    Past Surgical History:  There Is No Surgical History Contributory To Today’s Visit         Examination:  BP(125/89), Pulse(99),  1A: Level of Consciousness - Alert; keenly responsive + 0  1B: Ask Month and Age - Both Questions Right + 0  1C: Blink Eyes & Squeeze Hands - Performs Both Tasks + 0  2: Test Horizontal Extraocular Movements - Normal + 0  3: Test  Visual Fields - No Visual Loss + 0  4: Test Facial Palsy (Use Grimace if Obtunded) - Normal symmetry + 0  5A: Test Left Arm Motor Drift - Drift, but doesn't hit bed + 1  5B: Test Right Arm Motor Drift - No Drift for 10 Seconds + 0  6A: Test Left Leg Motor Drift - Some Effort Against Gravity + 2  6B: Test Right Leg Motor Drift - No Drift for 5 Seconds + 0  7: Test Limb Ataxia (FNF/Heel-Shin) - No Ataxia + 0  8: Test Sensation - Mild-Moderate Loss: Less Sharp/More Dull + 1  9: Test Language/Aphasia - Normal; No aphasia + 0  10: Test Dysarthria - Normal + 0  11: Test Extinction/Inattention - No abnormality + 0    NIHSS Score: 4      Pre-Morbid Modified Granite Scale:  0 Points = No symptoms at all    Spoke with : attending  I reviewed the available imaging via Rapid and initiated discussion with the primary provider    This consult was conducted in real time using interactive audio and video technology. Patient was informed of the technology being used for this visit and agreed to proceed. Patient located in hospital and provider located at home/office setting.      Patient is being evaluated for possible acute neurologic impairment and high probability of imminent or life-threatening deterioration. I spent total of 35 minutes providing care to this patient, including time for face to face visit via telemedicine, review of medical records, imaging studies and discussion of findings with providers, the patient and/or family.      Dr Meet Loya      [facility]  For Inpatient follow-up with [facility] physician please call [facility] at [phone]. As we are not an outpatient service for any post hospital discharge needs please contact the hospital for assistance.  If you have any questions for the [facility] physicians or need to reconsult for clinical or diagnostic changes please contact us via [facility] at [phone].

## 2025-03-03 NOTE — H&P
[facility]  Date: 3/2/2025   Patient Name: Velma nAdres  : 1975  MRN: [id number]  Primary Care Physician:  Jignesh Doe  Date of admission: 3/2/2025    Subjective concern for stroke  Subjective   Chief complaint concern for stroke    HPI: This is a 50-year-old female with a past medical history of questionable stroke, cervical CA who presents to the emergency room with left-sided weakness and numbness.  Patient was last known well last night.  She woke up with the symptoms.  She has right-sided deficits and left-sided deficits in the past.  Teleneurology recommends admission for further workup.    On arrival to the ED, patient is a temperature of 98.2, pulse of 99, respiratory rate of 18, blood pressure 125/89, and she saturating 88% on 3.5 L of oxygen.      Head CT is normal.  Chest x-ray is normal.  CT head and neck angiogram shows patent dural venous sinuses.    On labs, patient's sodium is 139, creatinine is 1.07, glucose is 129.  She has no abnormalities on her CBC.  Personal History     Past Medical History:  Past Medical History:   Diagnosis Date    Anxiety     Cervix cancer     TREATED NO REOCCURANCE    Hernia of abdominal wall     Kidney stone     Thyroid condition     Umbilical hernia        Past Surgical History:  Past Surgical History:   Procedure Laterality Date    KIDNEY STONE SURGERY      LEG SURGERY      vein repair    VENTRAL HERNIA REPAIR N/A 2024    Procedure: Robotic umbilical hernia repair-repair hernia at umbilicus with small incisions, camera and robotic instruments;  Surgeon: Devin Abdi MD;  Location: [facility] OR [facility] [place];  Service: Robotics - [employer];  Laterality: N/A;       Family History:   Family History   Problem Relation Age of Onset    Malig Hyperthermia Neg Hx        Social History:   Social History     Socioeconomic History    Marital status: Single   Tobacco Use    Smoking status: Every Day     Current packs/day: 1.00      Types: Cigarettes    Smokeless tobacco: Never    Tobacco comments:     INST PER ANESTHESIA PROTOCOL   Vaping Use    Vaping status: Never Used   Substance and Sexual Activity    Alcohol use: Not Currently    Drug use: Never    Sexual activity: Defer       Home Medications:  ALPRAZolam, ARIPiprazole, FLUoxetine, HYDROcodone-acetaminophen, busPIRone, cephalexin, cloNIDine, cyclobenzaprine, dicyclomine, docusate sodium, gabapentin, hydrOXYzine, levothyroxine, mirtazapine, ondansetron ODT, oxyCODONE-acetaminophen, and venlafaxine XR    Allergies:  Allergies   Allergen Reactions    Ct Contrast Anaphylaxis    Tramadol Other (See Comments), Unknown (See Comments) and Headache     MIGRAINES    Ibuprofen Nausea And Vomiting and Other (See Comments)     ULCER    Other reaction(s): Nausea and vomiting   ULCER       Review of Systems   All systems were reviewed and negative except for: Left-sided numbness and weakness    Objective   Objective     Vitals:   Temp:  [98.2 °F (36.8 °C)] 98.2 °F (36.8 °C)  Heart Rate:  [] 108  Resp:  [18-20] 20  BP: (109-125)/(62-89) 109/62  Flow (L/min) (Oxygen Therapy):  [3-4.5] 3    Physical Exam    Constitutional: Awake, alert, no acute distress   Eyes: Pupils equal, sclerae anicteric, no conjunctival injection   HENT: NCAT, mucous membranes moist   Neck: Supple, no thyromegaly, no lymphadenopathy, trachea midline   Respiratory: Clear to auscultation bilaterally, nonlabored respirations    Cardiovascular: RRR, no murmurs, rubs, or gallops, palpable pedal pulses bilaterally   Gastrointestinal: Positive bowel sounds, soft, nontender, nondistended   Musculoskeletal: No bilateral ankle edema, no clubbing or cyanosis to extremities   Psychiatric: Appropriate affect, cooperative   Neurologic: Oriented x 3, strength symmetric in all extremities, Cranial Nerves grossly intact to confrontation, speech clear   Skin: No rashes     Result Review    Result Review:  I have personally reviewed the results  from the time of this admission to 3/2/2025 20:45 EST and agree with these findings:  [x]  Laboratory  [x]  Microbiology  [x]  Radiology  [x]  EKG/Telemetry   [x]  Cardiology/Vascular   [x]  Pathology  [x]  Old records  []  Other:      Assessment & Plan   Assessment / Plan   #1 Ischemic Stroke  -Risk factors for stroke: none  -Prior CVA/TIA:no  -Head CT shows:wnl  -Reperfusion therapy:no  -MRI, echo ordered, telemetry  -CBC, BMP, lipid panel, hemoglobin A1c  -Started on ASA and high intensity statin  -Consulted neurology: yes    #2 depression and anxiety     #3 hypothyroidism    Will reconcile meds once verified.       VTE Prophylaxis:  Mechanical VTE prophylaxis orders are signed & held.          CODE STATUS:    Level Of Support Discussed With: Patient  Code Status (Patient has no pulse and is not breathing): CPR (Attempt to Resuscitate)  Medical Interventions (Patient has pulse or is breathing): Full Support      Admission Status:  I believe this patient meets inpatient status.    Electronically signed by Domo Pelaez DO, 03/02/25, 8:45 PM EST.

## 2025-03-07 ENCOUNTER — TRANSCRIBE ORDERS (OUTPATIENT)
Dept: ADMINISTRATIVE | Facility: HOSPITAL | Age: 50
End: 2025-03-07
Payer: MEDICARE

## 2025-03-07 ENCOUNTER — APPOINTMENT (OUTPATIENT)
Facility: HOSPITAL | Age: 50
DRG: 915 | End: 2025-03-07
Payer: MEDICARE

## 2025-03-07 DIAGNOSIS — M79.605 LEFT LEG PAIN: Primary | ICD-10-CM

## 2025-03-07 DIAGNOSIS — I79.8 OTHER DISORDERS OF ARTERIES, ARTERIOLES AND CAPILLARIES IN DISEASES CLASSIFIED ELSEWHERE: ICD-10-CM

## 2025-03-07 LAB
ALBUMIN SERPL-MCNC: 3.6 G/DL (ref 3.5–5.2)
ALBUMIN/GLOB SERPL: 1 G/DL
ALP SERPL-CCNC: 90 U/L (ref 39–117)
ALT SERPL W P-5'-P-CCNC: 18 U/L (ref 1–33)
ANION GAP SERPL CALCULATED.3IONS-SCNC: 10.8 MMOL/L (ref 5–15)
AST SERPL-CCNC: 20 U/L (ref 1–32)
BASOPHILS # BLD AUTO: 0.07 10*3/MM3 (ref 0–0.2)
BASOPHILS NFR BLD AUTO: 0.9 % (ref 0–1.5)
BILIRUB SERPL-MCNC: <0.2 MG/DL (ref 0–1.2)
BUN SERPL-MCNC: 12 MG/DL (ref 6–20)
BUN/CREAT SERPL: 11.2 (ref 7–25)
CALCIUM SPEC-SCNC: 9.1 MG/DL (ref 8.6–10.5)
CHLORIDE SERPL-SCNC: 106 MMOL/L (ref 98–107)
CO2 SERPL-SCNC: 24.2 MMOL/L (ref 22–29)
CREAT SERPL-MCNC: 1.07 MG/DL (ref 0.57–1)
DEPRECATED RDW RBC AUTO: 40 FL (ref 37–54)
EGFRCR SERPLBLD CKD-EPI 2021: 64.2 ML/MIN/1.73
EOSINOPHIL # BLD AUTO: 0.36 10*3/MM3 (ref 0–0.4)
EOSINOPHIL NFR BLD AUTO: 4.6 % (ref 0.3–6.2)
ERYTHROCYTE [DISTWIDTH] IN BLOOD BY AUTOMATED COUNT: 12.6 % (ref 12.3–15.4)
GLOBULIN UR ELPH-MCNC: 3.5 GM/DL
GLUCOSE SERPL-MCNC: 125 MG/DL (ref 65–99)
HCT VFR BLD AUTO: 39.9 % (ref 34–46.6)
HGB BLD-MCNC: 13.3 G/DL (ref 12–15.9)
IMM GRANULOCYTES # BLD AUTO: 0.03 10*3/MM3 (ref 0–0.05)
IMM GRANULOCYTES NFR BLD AUTO: 0.4 % (ref 0–0.5)
LYMPHOCYTES # BLD AUTO: 3.17 10*3/MM3 (ref 0.7–3.1)
LYMPHOCYTES NFR BLD AUTO: 40.5 % (ref 19.6–45.3)
MCH RBC QN AUTO: 29.1 PG (ref 26.6–33)
MCHC RBC AUTO-ENTMCNC: 33.3 G/DL (ref 31.5–35.7)
MCV RBC AUTO: 87.3 FL (ref 79–97)
MONOCYTES # BLD AUTO: 0.56 10*3/MM3 (ref 0.1–0.9)
MONOCYTES NFR BLD AUTO: 7.2 % (ref 5–12)
NEUTROPHILS NFR BLD AUTO: 3.64 10*3/MM3 (ref 1.7–7)
NEUTROPHILS NFR BLD AUTO: 46.4 % (ref 42.7–76)
NRBC BLD AUTO-RTO: 0 /100 WBC (ref 0–0.2)
PLATELET # BLD AUTO: 295 10*3/MM3 (ref 140–450)
PMV BLD AUTO: 9.5 FL (ref 6–12)
POTASSIUM SERPL-SCNC: 4.1 MMOL/L (ref 3.5–5.2)
PROT SERPL-MCNC: 7.1 G/DL (ref 6–8.5)
RBC # BLD AUTO: 4.57 10*6/MM3 (ref 3.77–5.28)
SODIUM SERPL-SCNC: 141 MMOL/L (ref 136–145)
WBC NRBC COR # BLD AUTO: 7.83 10*3/MM3 (ref 3.4–10.8)

## 2025-03-07 PROCEDURE — 93971 EXTREMITY STUDY: CPT | Performed by: SURGERY

## 2025-03-07 PROCEDURE — 85025 COMPLETE CBC W/AUTO DIFF WBC: CPT

## 2025-03-07 PROCEDURE — 93971 EXTREMITY STUDY: CPT

## 2025-03-07 PROCEDURE — 25010000002 KETOROLAC TROMETHAMINE PER 15 MG

## 2025-03-07 PROCEDURE — 36415 COLL VENOUS BLD VENIPUNCTURE: CPT

## 2025-03-07 PROCEDURE — 99291 CRITICAL CARE FIRST HOUR: CPT

## 2025-03-07 PROCEDURE — 80053 COMPREHEN METABOLIC PANEL: CPT

## 2025-03-07 RX ORDER — KETOROLAC TROMETHAMINE 30 MG/ML
30 INJECTION, SOLUTION INTRAMUSCULAR; INTRAVENOUS ONCE
Status: COMPLETED | OUTPATIENT
Start: 2025-03-07 | End: 2025-03-07

## 2025-03-07 RX ADMIN — KETOROLAC TROMETHAMINE 30 MG: 30 INJECTION, SOLUTION INTRAMUSCULAR; INTRAVENOUS at 22:59

## 2025-03-08 ENCOUNTER — HOSPITAL ENCOUNTER (INPATIENT)
Facility: HOSPITAL | Age: 50
LOS: 1 days | Discharge: HOME | DRG: 915 | End: 2025-03-09
Attending: EMERGENCY MEDICINE | Admitting: INTERNAL MEDICINE
Payer: MEDICARE

## 2025-03-08 ENCOUNTER — APPOINTMENT (OUTPATIENT)
Dept: CT IMAGING | Facility: HOSPITAL | Age: 50
DRG: 915 | End: 2025-03-08
Payer: MEDICARE

## 2025-03-08 DIAGNOSIS — J98.01 ACUTE BRONCHOSPASM: ICD-10-CM

## 2025-03-08 DIAGNOSIS — T50.8X5A ALLERGIC REACTION TO CONTRAST MATERIAL, INITIAL ENCOUNTER: ICD-10-CM

## 2025-03-08 DIAGNOSIS — J96.01 ACUTE RESPIRATORY FAILURE WITH HYPOXIA: Primary | ICD-10-CM

## 2025-03-08 DIAGNOSIS — T78.2XXA ANAPHYLAXIS, INITIAL ENCOUNTER: ICD-10-CM

## 2025-03-08 DIAGNOSIS — R13.12 OROPHARYNGEAL DYSPHAGIA: ICD-10-CM

## 2025-03-08 PROBLEM — Z91.041 CONTRAST MEDIA ALLERGY: Status: ACTIVE | Noted: 2025-03-08

## 2025-03-08 PROBLEM — I73.9 PERIPHERAL ARTERY DISEASE: Status: ACTIVE | Noted: 2025-03-08

## 2025-03-08 PROBLEM — G89.29 CHRONIC PAIN: Status: ACTIVE | Noted: 2025-03-08

## 2025-03-08 PROBLEM — J96.91 HYPOXIC RESPIRATORY FAILURE: Status: ACTIVE | Noted: 2025-03-08

## 2025-03-08 LAB
ANION GAP SERPL CALCULATED.3IONS-SCNC: 14.3 MMOL/L (ref 5–15)
ARTERIAL PATENCY WRIST A: ABNORMAL
ATMOSPHERIC PRESS: 741.5 MMHG
BASE EXCESS BLDA CALC-SCNC: -7.1 MMOL/L (ref -2–2)
BASOPHILS # BLD AUTO: 0.13 10*3/MM3 (ref 0–0.2)
BASOPHILS NFR BLD AUTO: 0.8 % (ref 0–1.5)
BDY SITE: ABNORMAL
BH CV LOWER VASCULAR LEFT COMMON FEMORAL AUGMENT: NORMAL
BH CV LOWER VASCULAR LEFT COMMON FEMORAL COMPETENT: NORMAL
BH CV LOWER VASCULAR LEFT COMMON FEMORAL COMPRESS: NORMAL
BH CV LOWER VASCULAR LEFT COMMON FEMORAL PHASIC: NORMAL
BH CV LOWER VASCULAR LEFT COMMON FEMORAL SPONT: NORMAL
BH CV LOWER VASCULAR LEFT DISTAL FEMORAL COMPRESS: NORMAL
BH CV LOWER VASCULAR LEFT GASTRONEMIUS COMPRESS: NORMAL
BH CV LOWER VASCULAR LEFT GREATER SAPH AK COMPRESS: NORMAL
BH CV LOWER VASCULAR LEFT GREATER SAPH BK COMPRESS: NORMAL
BH CV LOWER VASCULAR LEFT LESSER SAPH COMPRESS: NORMAL
BH CV LOWER VASCULAR LEFT MID FEMORAL AUGMENT: NORMAL
BH CV LOWER VASCULAR LEFT MID FEMORAL COMPETENT: NORMAL
BH CV LOWER VASCULAR LEFT MID FEMORAL COMPRESS: NORMAL
BH CV LOWER VASCULAR LEFT MID FEMORAL PHASIC: NORMAL
BH CV LOWER VASCULAR LEFT MID FEMORAL SPONT: NORMAL
BH CV LOWER VASCULAR LEFT PERONEAL COMPRESS: NORMAL
BH CV LOWER VASCULAR LEFT POPLITEAL AUGMENT: NORMAL
BH CV LOWER VASCULAR LEFT POPLITEAL COMPETENT: NORMAL
BH CV LOWER VASCULAR LEFT POPLITEAL COMPRESS: NORMAL
BH CV LOWER VASCULAR LEFT POPLITEAL PHASIC: NORMAL
BH CV LOWER VASCULAR LEFT POPLITEAL SPONT: NORMAL
BH CV LOWER VASCULAR LEFT POSTERIOR TIBIAL COMPRESS: NORMAL
BH CV LOWER VASCULAR LEFT PROXIMAL FEMORAL COMPRESS: NORMAL
BH CV LOWER VASCULAR LEFT SAPHENOFEMORAL JUNCTION COMPRESS: NORMAL
BH CV LOWER VASCULAR RIGHT COMMON FEMORAL AUGMENT: NORMAL
BH CV LOWER VASCULAR RIGHT COMMON FEMORAL COMPETENT: NORMAL
BH CV LOWER VASCULAR RIGHT COMMON FEMORAL COMPRESS: NORMAL
BH CV LOWER VASCULAR RIGHT COMMON FEMORAL PHASIC: NORMAL
BH CV LOWER VASCULAR RIGHT COMMON FEMORAL SPONT: NORMAL
BH CV VAS PRELIMINARY FINDINGS SCRIPTING: 1
BUN SERPL-MCNC: 14 MG/DL (ref 6–20)
BUN/CREAT SERPL: 10.3 (ref 7–25)
CALCIUM SPEC-SCNC: 7.7 MG/DL (ref 8.6–10.5)
CHLORIDE SERPL-SCNC: 107 MMOL/L (ref 98–107)
CO2 SERPL-SCNC: 16.7 MMOL/L (ref 22–29)
CREAT SERPL-MCNC: 1.36 MG/DL (ref 0.57–1)
D-LACTATE SERPL-SCNC: 0.9 MMOL/L (ref 0.5–2)
DEPRECATED RDW RBC AUTO: 42.5 FL (ref 37–54)
EGFRCR SERPLBLD CKD-EPI 2021: 48.1 ML/MIN/1.73
EOSINOPHIL # BLD AUTO: 0.03 10*3/MM3 (ref 0–0.4)
EOSINOPHIL NFR BLD AUTO: 0.2 % (ref 0.3–6.2)
ERYTHROCYTE [DISTWIDTH] IN BLOOD BY AUTOMATED COUNT: 12.9 % (ref 12.3–15.4)
GAS FLOW AIRWAY: 8 LPM
GEN 5 1HR TROPONIN T REFLEX: 6 NG/L
GLUCOSE SERPL-MCNC: 272 MG/DL (ref 65–99)
HCO3 BLDA-SCNC: 19.4 MMOL/L (ref 22–26)
HCT VFR BLD AUTO: 46 % (ref 34–46.6)
HCT VFR BLD CALC: 47 % (ref 38–51)
HEMODILUTION: NO
HGB BLD-MCNC: 14.9 G/DL (ref 12–15.9)
HGB BLDA-MCNC: 15.9 G/DL (ref 12–18)
IMM GRANULOCYTES # BLD AUTO: 0.31 10*3/MM3 (ref 0–0.05)
IMM GRANULOCYTES NFR BLD AUTO: 1.9 % (ref 0–0.5)
INHALED O2 CONCENTRATION: 40 %
LYMPHOCYTES # BLD AUTO: 2.15 10*3/MM3 (ref 0.7–3.1)
LYMPHOCYTES NFR BLD AUTO: 12.9 % (ref 19.6–45.3)
Lab: ABNORMAL
MAGNESIUM SERPL-MCNC: 2 MG/DL (ref 1.6–2.6)
MCH RBC QN AUTO: 29.2 PG (ref 26.6–33)
MCHC RBC AUTO-ENTMCNC: 32.4 G/DL (ref 31.5–35.7)
MCV RBC AUTO: 90.2 FL (ref 79–97)
MODALITY: ABNORMAL
MONOCYTES # BLD AUTO: 0.28 10*3/MM3 (ref 0.1–0.9)
MONOCYTES NFR BLD AUTO: 1.7 % (ref 5–12)
NEUTROPHILS NFR BLD AUTO: 13.78 10*3/MM3 (ref 1.7–7)
NEUTROPHILS NFR BLD AUTO: 82.5 % (ref 42.7–76)
NOTIFIED WHO: ABNORMAL
NRBC BLD AUTO-RTO: 0 /100 WBC (ref 0–0.2)
PCO2 BLDA: 41.9 MM HG (ref 35–45)
PH BLDA: 7.28 PH UNITS (ref 7.35–7.45)
PHOSPHATE SERPL-MCNC: 2.2 MG/DL (ref 2.5–4.5)
PLATELET # BLD AUTO: 381 10*3/MM3 (ref 140–450)
PMV BLD AUTO: 9.3 FL (ref 6–12)
PO2 BLD: 215 MM[HG] (ref 0–500)
PO2 BLDA: 86.1 MM HG (ref 80–100)
POTASSIUM SERPL-SCNC: 3.1 MMOL/L (ref 3.5–5.2)
QT INTERVAL: 385 MS
QT INTERVAL: 401 MS
QTC INTERVAL: 504 MS
QTC INTERVAL: 513 MS
RBC # BLD AUTO: 5.1 10*6/MM3 (ref 3.77–5.28)
READ BACK: YES
SAO2 % BLDCOA: 95.1 % (ref 95–99)
SODIUM SERPL-SCNC: 138 MMOL/L (ref 136–145)
TROPONIN T NUMERIC DELTA: NORMAL
TROPONIN T SERPL HS-MCNC: <6 NG/L
WBC NRBC COR # BLD AUTO: 16.68 10*3/MM3 (ref 3.4–10.8)

## 2025-03-08 PROCEDURE — 25010000002 ONDANSETRON PER 1 MG

## 2025-03-08 PROCEDURE — 84100 ASSAY OF PHOSPHORUS: CPT | Performed by: PHYSICIAN ASSISTANT

## 2025-03-08 PROCEDURE — 85025 COMPLETE CBC W/AUTO DIFF WBC: CPT | Performed by: STUDENT IN AN ORGANIZED HEALTH CARE EDUCATION/TRAINING PROGRAM

## 2025-03-08 PROCEDURE — 83605 ASSAY OF LACTIC ACID: CPT | Performed by: EMERGENCY MEDICINE

## 2025-03-08 PROCEDURE — 75635 CT ANGIO ABDOMINAL ARTERIES: CPT

## 2025-03-08 PROCEDURE — 94799 UNLISTED PULMONARY SVC/PX: CPT

## 2025-03-08 PROCEDURE — 25810000003 SODIUM CHLORIDE 0.9 % SOLUTION: Performed by: STUDENT IN AN ORGANIZED HEALTH CARE EDUCATION/TRAINING PROGRAM

## 2025-03-08 PROCEDURE — 25810000003 SODIUM CHLORIDE 0.9 % SOLUTION: Performed by: PHYSICIAN ASSISTANT

## 2025-03-08 PROCEDURE — 93005 ELECTROCARDIOGRAM TRACING: CPT | Performed by: INTERNAL MEDICINE

## 2025-03-08 PROCEDURE — 25010000002 DIPHENHYDRAMINE PER 50 MG: Performed by: STUDENT IN AN ORGANIZED HEALTH CARE EDUCATION/TRAINING PROGRAM

## 2025-03-08 PROCEDURE — 25010000002 MORPHINE PER 10 MG: Performed by: STUDENT IN AN ORGANIZED HEALTH CARE EDUCATION/TRAINING PROGRAM

## 2025-03-08 PROCEDURE — 83735 ASSAY OF MAGNESIUM: CPT | Performed by: PHYSICIAN ASSISTANT

## 2025-03-08 PROCEDURE — 82803 BLOOD GASES ANY COMBINATION: CPT | Performed by: EMERGENCY MEDICINE

## 2025-03-08 PROCEDURE — 25010000002 METHYLPREDNISOLONE PER 125 MG

## 2025-03-08 PROCEDURE — 99223 1ST HOSP IP/OBS HIGH 75: CPT | Performed by: STUDENT IN AN ORGANIZED HEALTH CARE EDUCATION/TRAINING PROGRAM

## 2025-03-08 PROCEDURE — 25010000002 MORPHINE PER 10 MG

## 2025-03-08 PROCEDURE — 25810000003 SODIUM CHLORIDE 0.9 % SOLUTION: Performed by: EMERGENCY MEDICINE

## 2025-03-08 PROCEDURE — 93005 ELECTROCARDIOGRAM TRACING: CPT | Performed by: EMERGENCY MEDICINE

## 2025-03-08 PROCEDURE — 84484 ASSAY OF TROPONIN QUANT: CPT | Performed by: INTERNAL MEDICINE

## 2025-03-08 PROCEDURE — 25010000002 HEPARIN (PORCINE) PER 1000 UNITS: Performed by: STUDENT IN AN ORGANIZED HEALTH CARE EDUCATION/TRAINING PROGRAM

## 2025-03-08 PROCEDURE — 25010000002 METHYLPREDNISOLONE PER 40 MG: Performed by: STUDENT IN AN ORGANIZED HEALTH CARE EDUCATION/TRAINING PROGRAM

## 2025-03-08 PROCEDURE — 25010000002 EPINEPHRINE 1 MG/ML SOLUTION

## 2025-03-08 PROCEDURE — 25510000001 IOPAMIDOL PER 1 ML: Performed by: EMERGENCY MEDICINE

## 2025-03-08 PROCEDURE — 99291 CRITICAL CARE FIRST HOUR: CPT | Performed by: INTERNAL MEDICINE

## 2025-03-08 PROCEDURE — 36600 WITHDRAWAL OF ARTERIAL BLOOD: CPT | Performed by: EMERGENCY MEDICINE

## 2025-03-08 PROCEDURE — 25010000002 ONDANSETRON PER 1 MG: Performed by: STUDENT IN AN ORGANIZED HEALTH CARE EDUCATION/TRAINING PROGRAM

## 2025-03-08 PROCEDURE — 94761 N-INVAS EAR/PLS OXIMETRY MLT: CPT

## 2025-03-08 PROCEDURE — 25010000002 DIPHENHYDRAMINE PER 50 MG

## 2025-03-08 PROCEDURE — 94640 AIRWAY INHALATION TREATMENT: CPT

## 2025-03-08 PROCEDURE — 80048 BASIC METABOLIC PNL TOTAL CA: CPT | Performed by: STUDENT IN AN ORGANIZED HEALTH CARE EDUCATION/TRAINING PROGRAM

## 2025-03-08 PROCEDURE — 25010000002 EPINEPHRINE 1 MG/ML SOLUTION 30 ML VIAL: Performed by: STUDENT IN AN ORGANIZED HEALTH CARE EDUCATION/TRAINING PROGRAM

## 2025-03-08 PROCEDURE — 25010000002 EPINEPHRINE 1 MG/ML SOLUTION: Performed by: STUDENT IN AN ORGANIZED HEALTH CARE EDUCATION/TRAINING PROGRAM

## 2025-03-08 PROCEDURE — 92610 EVALUATE SWALLOWING FUNCTION: CPT

## 2025-03-08 RX ORDER — IPRATROPIUM BROMIDE AND ALBUTEROL SULFATE 2.5; .5 MG/3ML; MG/3ML
SOLUTION RESPIRATORY (INHALATION)
Status: COMPLETED
Start: 2025-03-08 | End: 2025-03-08

## 2025-03-08 RX ORDER — AMOXICILLIN 250 MG
2 CAPSULE ORAL 2 TIMES DAILY PRN
Status: DISCONTINUED | OUTPATIENT
Start: 2025-03-08 | End: 2025-03-09 | Stop reason: HOSPADM

## 2025-03-08 RX ORDER — GABAPENTIN 300 MG/1
600 CAPSULE ORAL DAILY
Status: DISCONTINUED | OUTPATIENT
Start: 2025-03-08 | End: 2025-03-09 | Stop reason: HOSPADM

## 2025-03-08 RX ORDER — MORPHINE SULFATE 2 MG/ML
2 INJECTION, SOLUTION INTRAMUSCULAR; INTRAVENOUS EVERY 4 HOURS PRN
Status: DISCONTINUED | OUTPATIENT
Start: 2025-03-08 | End: 2025-03-09 | Stop reason: HOSPADM

## 2025-03-08 RX ORDER — NICOTINE 21 MG/24HR
1 PATCH, TRANSDERMAL 24 HOURS TRANSDERMAL
Status: DISCONTINUED | OUTPATIENT
Start: 2025-03-08 | End: 2025-03-09 | Stop reason: HOSPADM

## 2025-03-08 RX ORDER — METHYLPREDNISOLONE SODIUM SUCCINATE 125 MG/2ML
INJECTION, POWDER, LYOPHILIZED, FOR SOLUTION INTRAMUSCULAR; INTRAVENOUS
Status: COMPLETED
Start: 2025-03-08 | End: 2025-03-08

## 2025-03-08 RX ORDER — IPRATROPIUM BROMIDE AND ALBUTEROL SULFATE 2.5; .5 MG/3ML; MG/3ML
3 SOLUTION RESPIRATORY (INHALATION) ONCE
Status: COMPLETED | OUTPATIENT
Start: 2025-03-08 | End: 2025-03-08

## 2025-03-08 RX ORDER — METOPROLOL TARTRATE 25 MG/1
12.5 TABLET, FILM COATED ORAL EVERY 12 HOURS SCHEDULED
Status: DISCONTINUED | OUTPATIENT
Start: 2025-03-08 | End: 2025-03-08

## 2025-03-08 RX ORDER — HYDROCODONE BITARTRATE AND ACETAMINOPHEN 7.5; 325 MG/1; MG/1
1 TABLET ORAL EVERY 6 HOURS PRN
Status: DISCONTINUED | OUTPATIENT
Start: 2025-03-08 | End: 2025-03-09 | Stop reason: HOSPADM

## 2025-03-08 RX ORDER — GABAPENTIN 300 MG/1
600 CAPSULE ORAL DAILY
Status: DISCONTINUED | OUTPATIENT
Start: 2025-03-08 | End: 2025-03-08

## 2025-03-08 RX ORDER — BISACODYL 10 MG
10 SUPPOSITORY, RECTAL RECTAL DAILY PRN
Status: DISCONTINUED | OUTPATIENT
Start: 2025-03-08 | End: 2025-03-09 | Stop reason: HOSPADM

## 2025-03-08 RX ORDER — ALPRAZOLAM 1 MG/1
2 TABLET ORAL 3 TIMES DAILY PRN
Status: DISCONTINUED | OUTPATIENT
Start: 2025-03-08 | End: 2025-03-09 | Stop reason: HOSPADM

## 2025-03-08 RX ORDER — ONDANSETRON 2 MG/ML
4 INJECTION INTRAMUSCULAR; INTRAVENOUS EVERY 6 HOURS PRN
Status: DISCONTINUED | OUTPATIENT
Start: 2025-03-08 | End: 2025-03-09 | Stop reason: HOSPADM

## 2025-03-08 RX ORDER — NICOTINE 21 MG/24HR
1 PATCH, TRANSDERMAL 24 HOURS TRANSDERMAL
Status: DISCONTINUED | OUTPATIENT
Start: 2025-03-09 | End: 2025-03-08

## 2025-03-08 RX ORDER — ONDANSETRON 2 MG/ML
4 INJECTION INTRAMUSCULAR; INTRAVENOUS ONCE
Status: COMPLETED | OUTPATIENT
Start: 2025-03-08 | End: 2025-03-08

## 2025-03-08 RX ORDER — POTASSIUM CHLORIDE 750 MG/1
40 CAPSULE, EXTENDED RELEASE ORAL EVERY 4 HOURS
Status: COMPLETED | OUTPATIENT
Start: 2025-03-08 | End: 2025-03-08

## 2025-03-08 RX ORDER — DIPHENHYDRAMINE HYDROCHLORIDE 50 MG/ML
INJECTION INTRAMUSCULAR; INTRAVENOUS
Status: COMPLETED
Start: 2025-03-08 | End: 2025-03-08

## 2025-03-08 RX ORDER — EPINEPHRINE 1 MG/ML
INJECTION, SOLUTION INTRAMUSCULAR; SUBCUTANEOUS
Status: COMPLETED
Start: 2025-03-08 | End: 2025-03-08

## 2025-03-08 RX ORDER — SODIUM CHLORIDE 0.9 % (FLUSH) 0.9 %
10 SYRINGE (ML) INJECTION AS NEEDED
Status: DISCONTINUED | OUTPATIENT
Start: 2025-03-08 | End: 2025-03-09 | Stop reason: HOSPADM

## 2025-03-08 RX ORDER — FENTANYL/ROPIVACAINE/NS/PF 2-625MCG/1
15 PLASTIC BAG, INJECTION (ML) EPIDURAL ONCE
Status: COMPLETED | OUTPATIENT
Start: 2025-03-08 | End: 2025-03-08

## 2025-03-08 RX ORDER — WATER 10 ML/10ML
INJECTION INTRAMUSCULAR; INTRAVENOUS; SUBCUTANEOUS
Status: COMPLETED
Start: 2025-03-08 | End: 2025-03-08

## 2025-03-08 RX ORDER — ONDANSETRON 2 MG/ML
INJECTION INTRAMUSCULAR; INTRAVENOUS
Status: COMPLETED
Start: 2025-03-08 | End: 2025-03-08

## 2025-03-08 RX ORDER — DIPHENHYDRAMINE HYDROCHLORIDE 50 MG/ML
50 INJECTION INTRAMUSCULAR; INTRAVENOUS EVERY 6 HOURS
Status: DISCONTINUED | OUTPATIENT
Start: 2025-03-08 | End: 2025-03-09 | Stop reason: HOSPADM

## 2025-03-08 RX ORDER — LEVOTHYROXINE SODIUM 50 UG/1
100 TABLET ORAL
Status: DISCONTINUED | OUTPATIENT
Start: 2025-03-08 | End: 2025-03-09 | Stop reason: HOSPADM

## 2025-03-08 RX ORDER — BISACODYL 5 MG/1
5 TABLET, DELAYED RELEASE ORAL DAILY PRN
Status: DISCONTINUED | OUTPATIENT
Start: 2025-03-08 | End: 2025-03-09 | Stop reason: HOSPADM

## 2025-03-08 RX ORDER — SODIUM CHLORIDE 9 MG/ML
40 INJECTION, SOLUTION INTRAVENOUS AS NEEDED
Status: DISCONTINUED | OUTPATIENT
Start: 2025-03-08 | End: 2025-03-09 | Stop reason: HOSPADM

## 2025-03-08 RX ORDER — IOPAMIDOL 755 MG/ML
150 INJECTION, SOLUTION INTRAVASCULAR
Status: COMPLETED | OUTPATIENT
Start: 2025-03-08 | End: 2025-03-08

## 2025-03-08 RX ORDER — LEVOTHYROXINE SODIUM 100 UG/1
100 TABLET ORAL
COMMUNITY

## 2025-03-08 RX ORDER — POLYETHYLENE GLYCOL 3350 17 G/17G
17 POWDER, FOR SOLUTION ORAL DAILY PRN
Status: DISCONTINUED | OUTPATIENT
Start: 2025-03-08 | End: 2025-03-09 | Stop reason: HOSPADM

## 2025-03-08 RX ORDER — SODIUM CHLORIDE 0.9 % (FLUSH) 0.9 %
10 SYRINGE (ML) INJECTION EVERY 12 HOURS SCHEDULED
Status: DISCONTINUED | OUTPATIENT
Start: 2025-03-08 | End: 2025-03-09 | Stop reason: HOSPADM

## 2025-03-08 RX ORDER — ONDANSETRON 4 MG/1
4 TABLET, ORALLY DISINTEGRATING ORAL EVERY 6 HOURS PRN
Status: DISCONTINUED | OUTPATIENT
Start: 2025-03-08 | End: 2025-03-09 | Stop reason: HOSPADM

## 2025-03-08 RX ORDER — IPRATROPIUM BROMIDE AND ALBUTEROL SULFATE 2.5; .5 MG/3ML; MG/3ML
3 SOLUTION RESPIRATORY (INHALATION) EVERY 4 HOURS PRN
Status: DISCONTINUED | OUTPATIENT
Start: 2025-03-08 | End: 2025-03-09 | Stop reason: HOSPADM

## 2025-03-08 RX ORDER — HEPARIN SODIUM 5000 [USP'U]/ML
5000 INJECTION, SOLUTION INTRAVENOUS; SUBCUTANEOUS EVERY 12 HOURS SCHEDULED
Status: DISCONTINUED | OUTPATIENT
Start: 2025-03-08 | End: 2025-03-09 | Stop reason: HOSPADM

## 2025-03-08 RX ORDER — FAMOTIDINE 10 MG/ML
INJECTION, SOLUTION INTRAVENOUS
Status: COMPLETED
Start: 2025-03-08 | End: 2025-03-08

## 2025-03-08 RX ORDER — FAMOTIDINE 10 MG/ML
20 INJECTION, SOLUTION INTRAVENOUS EVERY 12 HOURS SCHEDULED
Status: DISCONTINUED | OUTPATIENT
Start: 2025-03-08 | End: 2025-03-09 | Stop reason: HOSPADM

## 2025-03-08 RX ORDER — SODIUM CHLORIDE 9 MG/ML
100 INJECTION, SOLUTION INTRAVENOUS CONTINUOUS
Status: DISCONTINUED | OUTPATIENT
Start: 2025-03-08 | End: 2025-03-09 | Stop reason: HOSPADM

## 2025-03-08 RX ADMIN — EPINEPHRINE 0.1 MCG/KG/MIN: 1 INJECTION INTRAMUSCULAR; INTRAVENOUS; SUBCUTANEOUS at 17:14

## 2025-03-08 RX ADMIN — SODIUM CHLORIDE 100 ML/HR: 9 INJECTION, SOLUTION INTRAVENOUS at 20:01

## 2025-03-08 RX ADMIN — MORPHINE SULFATE: 4 INJECTION, SOLUTION INTRAMUSCULAR; INTRAVENOUS at 02:25

## 2025-03-08 RX ADMIN — DIPHENHYDRAMINE HYDROCHLORIDE 50 MG: 50 INJECTION, SOLUTION INTRAMUSCULAR; INTRAVENOUS at 06:19

## 2025-03-08 RX ADMIN — ONDANSETRON 4 MG: 2 INJECTION INTRAMUSCULAR; INTRAVENOUS at 03:47

## 2025-03-08 RX ADMIN — HEPARIN SODIUM 5000 UNITS: 5000 INJECTION INTRAVENOUS; SUBCUTANEOUS at 20:00

## 2025-03-08 RX ADMIN — Medication 10 ML: at 20:09

## 2025-03-08 RX ADMIN — POTASSIUM CHLORIDE 40 MEQ: 750 CAPSULE, EXTENDED RELEASE ORAL at 16:09

## 2025-03-08 RX ADMIN — HEPARIN SODIUM 5000 UNITS: 5000 INJECTION INTRAVENOUS; SUBCUTANEOUS at 08:47

## 2025-03-08 RX ADMIN — DIPHENHYDRAMINE HYDROCHLORIDE 50 MG: 50 INJECTION, SOLUTION INTRAMUSCULAR; INTRAVENOUS at 19:59

## 2025-03-08 RX ADMIN — GABAPENTIN 600 MG: 300 CAPSULE ORAL at 21:19

## 2025-03-08 RX ADMIN — IOPAMIDOL 150 ML: 755 INJECTION, SOLUTION INTRAVENOUS at 03:33

## 2025-03-08 RX ADMIN — POTASSIUM CHLORIDE 40 MEQ: 750 CAPSULE, EXTENDED RELEASE ORAL at 20:00

## 2025-03-08 RX ADMIN — IPRATROPIUM BROMIDE AND ALBUTEROL SULFATE 3 ML: 2.5; .5 SOLUTION RESPIRATORY (INHALATION) at 03:50

## 2025-03-08 RX ADMIN — SODIUM CHLORIDE 500 ML: 9 INJECTION, SOLUTION INTRAVENOUS at 03:48

## 2025-03-08 RX ADMIN — SODIUM CHLORIDE 15 MMOL: 9 INJECTION, SOLUTION INTRAVENOUS at 17:15

## 2025-03-08 RX ADMIN — IPRATROPIUM BROMIDE AND ALBUTEROL SULFATE 3 ML: .5; 3 SOLUTION RESPIRATORY (INHALATION) at 03:50

## 2025-03-08 RX ADMIN — METHYLPREDNISOLONE SODIUM SUCCINATE 40 MG: 40 INJECTION INTRAMUSCULAR; INTRAVENOUS at 12:30

## 2025-03-08 RX ADMIN — FAMOTIDINE 20 MG: 10 INJECTION INTRAVENOUS at 08:56

## 2025-03-08 RX ADMIN — Medication 10 ML: at 08:48

## 2025-03-08 RX ADMIN — WATER: 1 INJECTION INTRAMUSCULAR; INTRAVENOUS; SUBCUTANEOUS at 01:41

## 2025-03-08 RX ADMIN — FAMOTIDINE: 10 INJECTION INTRAVENOUS at 01:41

## 2025-03-08 RX ADMIN — METHYLPREDNISOLONE SODIUM SUCCINATE: 125 INJECTION, POWDER, FOR SOLUTION INTRAMUSCULAR; INTRAVENOUS at 01:41

## 2025-03-08 RX ADMIN — IPRATROPIUM BROMIDE AND ALBUTEROL SULFATE 3 ML: .5; 3 SOLUTION RESPIRATORY (INHALATION) at 04:21

## 2025-03-08 RX ADMIN — ONDANSETRON 4 MG: 2 INJECTION INTRAMUSCULAR; INTRAVENOUS at 12:30

## 2025-03-08 RX ADMIN — DIPHENHYDRAMINE HYDROCHLORIDE: 50 INJECTION, SOLUTION INTRAMUSCULAR; INTRAVENOUS at 01:41

## 2025-03-08 RX ADMIN — DIPHENHYDRAMINE HYDROCHLORIDE 50 MG: 50 INJECTION, SOLUTION INTRAMUSCULAR; INTRAVENOUS at 12:30

## 2025-03-08 RX ADMIN — EPINEPHRINE 0.02 MCG/KG/MIN: 1 INJECTION INTRAMUSCULAR; INTRAVENOUS; SUBCUTANEOUS at 06:09

## 2025-03-08 RX ADMIN — METHYLPREDNISOLONE SODIUM SUCCINATE 40 MG: 40 INJECTION INTRAMUSCULAR; INTRAVENOUS at 17:15

## 2025-03-08 RX ADMIN — MORPHINE SULFATE 2 MG: 2 INJECTION, SOLUTION INTRAMUSCULAR; INTRAVENOUS at 06:42

## 2025-03-08 RX ADMIN — NICOTINE 1 PATCH: 21 PATCH, EXTENDED RELEASE TRANSDERMAL at 21:19

## 2025-03-08 RX ADMIN — SODIUM CHLORIDE 1000 ML: 9 INJECTION, SOLUTION INTRAVENOUS at 06:15

## 2025-03-08 RX ADMIN — FAMOTIDINE 20 MG: 10 INJECTION INTRAVENOUS at 20:02

## 2025-03-08 RX ADMIN — EPINEPHRINE 0.3 MG: 1 INJECTION INTRAMUSCULAR; INTRAVENOUS; SUBCUTANEOUS at 03:37

## 2025-03-08 RX ADMIN — ONDANSETRON: 2 INJECTION INTRAMUSCULAR; INTRAVENOUS at 01:46

## 2025-03-08 NOTE — H&P
Patient Care Team:  Jignesh Doe as PCP - General (Internal Medicine)    Chief complaint leg pain    Subjective     Patient is a 50 y.o. female with history of [facility]  for [facility] artery disease 2 months ago presents with left leg pain.  Patient was doing well after surgery however has had increasing pain for the past week.  She has associated numbness and tingling to the foot and swelling.  She had a Doppler ultrasound done that was negative.  CT angiography was done that showed patent stent and no other abnormalities.  Patient does have a allergy to contrast dye and had a reaction.  Patient required IM epinephrine, nebulizers for her allergy.  She was also placed on Airvo.    Review of Systems   Pertinent items are noted in HPI    History  Past Medical History:   Diagnosis Date    Anxiety     Cervix cancer     TREATED NO REOCCURANCE    Hernia of abdominal wall     Kidney stone     Thyroid condition     Umbilical hernia      Past Surgical History:   Procedure Laterality Date    KIDNEY STONE SURGERY      LEG SURGERY      vein repair    VENTRAL HERNIA REPAIR N/A 12/19/2024    Procedure: Robotic umbilical hernia repair-repair hernia at umbilicus with small incisions, camera and robotic instruments;  Surgeon: Devin Abdi MD;  Location: [facility] [place] [facility] [place];  Service: Robotics - [employer];  Laterality: N/A;     Family History   Problem Relation Age of Onset    Malig Hyperthermia Neg Hx      Social History     Tobacco Use    Smoking status: Every Day     Current packs/day: 1.00     Types: Cigarettes    Smokeless tobacco: Never    Tobacco comments:     INST PER ANESTHESIA PROTOCOL   Vaping Use    Vaping status: Never Used   Substance Use Topics    Alcohol use: Not Currently    Drug use: Never     (Not in a hospital admission)   Allergies:  Ct contrast, Tramadol, and Ibuprofen    Objective     Vital Signs  Temp:  [98.3 °F (36.8 °C)] 98.3 °F (36.8 °C)  Heart Rate:  [] 121  Resp:  [18-35] 22  BP:  ()/(53-80) 98/66    Physical Exam:      General Appearance:  Alert, cooperative, in no acute distress   Head:  Normocephalic, without obvious abnormality, atraumatic   Eyes:  Lids and lashes normal, conjunctivae and sclerae normal, no icterus, no pallor, corneas clear, PERRLA   Ears:  Ears appear intact with no abnormalities noted   Throat:  No oral lesions, no thrush, oral mucosa moist   Neck:  No adenopathy, supple, trachea midline, no thyromegaly, no carotid bruit, no JVD   Back:  No kyphosis present, no scoliosis present, no skin lesions, erythema or scars, no tenderness to percussion, or palpation, range of motion normal   Lungs:  Clear to auscultation,respirations regular, even and unlabored    Heart:  Regular rhythm and normal rate, normal S1 and S2, no murmur, no gallop, no rub, no click   Breast Exam:  Deferred   Abdomen:  Normal bowel sounds, no masses, no organomegaly, soft non-tender, non-distended, no guarding, no rebound tenderness   Genitalia:  Deferred   Extremities:  Moves all extremities well, no edema, no cyanosis, no redness   Pulses:  Pulses palpable and equal bilaterally   Skin:  No bleeding, bruising or rash   Lymph nodes:  No palpable adenopathy   Neurologic:  Cranial nerves 2 - 12 grossly intact, sensation intact, DTR present and equal bilaterally       Results Review:    I reviewed the patient's new clinical results.  I reviewed the patient's new imaging results and agree with the interpretation.  I reviewed the patient's other test results and agree with the interpretation  I personally viewed and interpreted the patient's EKG/Telemetry data    Assessment & Plan       Contrast media allergy    Chronic pain    Peripheral artery disease    Hypoxic respiratory failure    Anaphylactic reaction      Admit to hospitalist service   ICU monitoring  Telemetry  Closely monitor hemodynamics--epi drip if patient becomes hypotensive  IV Solu-Medrol   IV Benadryl  Pepcid  IV fluids   restarted  patient's home dose of Norco  Supportive care  Heart healthy diet   DVT prophylaxis  Restart pertinent home medications  Full code    Arpan Sanchez MD  03/08/25  05:20 EST

## 2025-03-08 NOTE — PLAN OF CARE
Goal Outcome Evaluation:  Plan of Care Reviewed With: patient, child        Progress: no change     Pt a/o times 4. B/p low at times EPI drip at 0.06 infusing. Cm sr sats 94% AIRVO 40% fio2

## 2025-03-08 NOTE — THERAPY EVALUATION
Acute Care - Speech Language Pathology   Swallow Initial Evaluation  Rosario     Patient Name: Velma Andres  : 1975  MRN: [id number]  Today's Date: 3/8/2025               Admit Date: 3/8/2025    Visit Dx:     ICD-10-CM ICD-9-CM   1. Acute respiratory failure with hypoxia  J96.01 518.81   2. Allergic reaction to contrast material, initial encounter  T50.8X5A 995.27   3. Acute bronchospasm  J98.01 519.11   4. Anaphylaxis, initial encounter  T78.2XXA 995.0   5. Oropharyngeal dysphagia  R13.12 787.22     Patient Active Problem List   Diagnosis    Umbilical hernia without obstruction and without gangrene    Stroke    Contrast media allergy    Chronic pain    Peripheral artery disease    Hypoxic respiratory failure    Anaphylactic reaction     Past Medical History:   Diagnosis Date    Anxiety     Cervix cancer     TREATED NO REOCCURANCE    Hernia of abdominal wall     Kidney stone     Thyroid condition     Umbilical hernia      Past Surgical History:   Procedure Laterality Date    KIDNEY STONE SURGERY      LEG SURGERY      vein repair    VENTRAL HERNIA REPAIR N/A 2024    Procedure: Robotic umbilical hernia repair-repair hernia at umbilicus with small incisions, camera and robotic instruments;  Surgeon: Devin Abdi MD;  Location: [facility] [place] [facility] [place];  Service: [facility]otics - [facility];  Laterality: N/A;             Inpatient Speech Pathology Dysphagia Evaluation        PAIN SCALE: None indicated.    PRECAUTIONS/CONTRAINDICATIONS: Standard    SUSPECTED ABUSE/NEGLECT/EXPLOITATION: None indicated.    SOCIAL/PSYCHOLOGICAL NEEDS/BARRIERS: None indicated.    PAST SOCIAL HISTORY: 50-year-old female lives at home with significant other    PRIOR FUNCTION: Independent    PATIENT GOALS/EXPECTATIONS: Continue eating orally, asking for water    HISTORY: 50-year-old female the above diagnosis referred for speech therapy evaluation to assess for swallowing.  Nursing reports patient getting choked with initial trials of thin  liquid.  No previous speech pathology services are reported.    CURRENT DIET LEVEL: N.p.o.    OBJECTIVE:    TEST ADMINISTERED: Clinical dysphagia evaluation    COGNITION/SAFETY AWARENESS: Patient follow directions and responded to questions without difficulty.  Initially requiring cues for alertness.    BEHAVIORAL OBSERVATIONS: Alert and cooperative    ORAL MOTOR EXAM:  decreased lingual lateralization 4 -/5, patient missing upper dentition.     VOICE QUALITY: Hoarseness    REFLEX EXAM: Patient demonstrated cough    POSTURE: Assisted sitting upright in bed    FEEDING/SWALLOWING FUNCTION: Assessed with nectar liquid, thin liquids, puréed solids, crunchy solid.    CLINICAL OBSERVATIONS: Patient given ice chip with delayed swallow, swallow noted.  Nectar liquid by spoon and by cup with minimal delay, vocal quality remaining clear to cervical auscultation.  Thin liquid by cup and by straw with minimal delay, patient requiring cueing for slower rate, vocal quality remaining clear to cervical auscultation.  Purée solid with swallow completed, laryngeal elevation was difficult to palpate.  Crunchy solid with extended chewing followed by swallow completed clearing the oral cavity.    DYSPHAGIA CRITERIA: Dysphagia, risk of aspiration    FUNCTIONAL ASSESSMENT INSTRUMENT: Patient currently scored a level 6 of 7 on Functional Communication Measures for swallowing indicating a 1-19% limitation in function.    ASSESSMENT/ PLAN OF CARE:  Pt presents with limitations, noted below, that impede her ability to swallow safely and maintain nutrition. The skills of a therapist will be required to safely and effectively implement the following treatment plan to restore maximal level of function.    PROBLEMS:  1.   Swallow delay, risk of aspiration                       LTG 1: 30 days: Patient will tolerate least restrictive diet utilizing appropriate positioning and strategies with minimal assistance.                       STG 1a: 14  days: Patient will tolerate diet of mechanical ground solids and thin liquids with minimal assistance for strategies.                       STG 1b: 14 days: Patient will tolerate 8 of 10 trials of thin liquids with min to no signs or symptoms of aspiration.                       STG 1c: 14 days:  Patient/family education.                       TREATMENT: Dysphagia therapy to address swallow function through exercises and education of strategies.     FREQUENCY/DURATION: Once daily 5 times per week    REHAB POTENTIAL:  Pt has fair to good rehab potential.  The following limitations may influence improvement/ length of tx: Medical status.    RECOMMENDATIONS:   1.   DIET: Mechanical ground solids, thin liquid    2.  POSITION: Positioning fully upright for all p.o. intake and 30 minutes following.    3.  COMPENSATORY STRATEGIES: Assist patient with set up, assist patient to feed self.  Alternate small bites and small sips of solids and liquids at a slow rate.  Cue for slow rate with drinking.  Medications whole in applesauce first 48 hours.    Pt/responsible party agrees with plan of care and has been informed of all alternatives, risks and benefits.                            Anticipated Discharge Disposition (SLP): home (03/08/25 1026)                                                               EDUCATION  The patient has been educated in the following areas:   Modified Diet Instruction.                Time Calculation:    Time Calculation- SLP       Row Name 03/08/25 1026             Time Calculation- SLP    SLP Start Time 0930  -TB      SLP Stop Time 1030  -TB      SLP Time Calculation (min) 60 min  -TB      SLP Received On 03/08/25  -TB         Untimed Charges    SLP Eval/Re-eval  ST Eval Oral Pharyng Swallow - 02741  -TB      [id number] Eval Oral Pharyng Swallow Minutes 60  -TB         Total Minutes    Untimed Charges Total Minutes 60  -TB       Total Minutes 60  -TB                User Key  (r) = Recorded By, (t) =  Taken By, (c) = Cosigned By      Initials Name Provider Type    TB Lyndsay Ashby SLP Speech and Language Pathologist                    Therapy Charges for Today       Code Description Service Date Service Provider Modifiers Qty    [id number]  ST EVAL ORAL PHARYNG SWALLOW 4 3/8/2025 Lyndsay Ashby SLP GN 1                 TEN Pryor  3/8/2025

## 2025-03-08 NOTE — CONSULTS
Pulmonary / Critical Care Consult Note      Patient Name: Velma Andres  : 1975  MRN: [id number]  Primary Care Physician:  Jignesh Doe  Referring Physician: No Known Provider  Date of admission: 3/8/2025    Subjective   Subjective     Reason for Consult/ Chief Complaint: Anaphylactic reaction to IV contrast.    HPI:  Velma Andres is a 50 y.o. female with history of left leg stent at [facility] for peripheral artery disease 2 months ago, presented with left leg pain for a week, along with numbness and tingling and swelling of the leg.  Doppler ultrasound was negative.  CT angiogram was done that showed patent stent and no other abnormalities.  Patient had allergy to contrast dye, was premedicated with IV Solu-Medrol, had CT scan.  However, she started having worsening wheezing and shortness of breath after the CT scan.  She started having desaturations, and was wheezing.  Was given 0.3 mg of epinephrine IM.  DuoNeb was given, Zofran was given and 500 cc IV fluid bolus was given.  Patient needed to be on Airvo, was having labile blood pressure in 90s by 60s with tachycardia in 120s.  The patient is admitted to ICU with anaphylactic reaction to IV contrast.  Pulm and critical services consulted for assistance with management of acute issues.  Currently patient continues to require  epinephrine at 0.1.  Patient is very drowsy and  is at bedside.    Personal History     Past Medical History:   Diagnosis Date   • Anxiety    • Cervix cancer     TREATED NO REOCCURANCE   • Hernia of abdominal wall    • Kidney stone    • Thyroid condition    • Umbilical hernia        Past Surgical History:   Procedure Laterality Date   • KIDNEY STONE SURGERY     • LEG SURGERY      vein repair   • VENTRAL HERNIA REPAIR N/A 2024    Procedure: Robotic umbilical hernia repair-repair hernia at umbilicus with small incisions, camera and robotic instruments;  Surgeon: Devin Abdi MD;  Location:   [facility];  Service: Robotics - [employer];  Laterality: N/A;       Family History: Unknown family history of severe allergies and anaphylaxis.      Social History:  reports that she has been smoking cigarettes. She has never used smokeless tobacco. She reports that she does not currently use alcohol. She reports that she does not use drugs.    Home Medications:  ALPRAZolam, cloNIDine, gabapentin, hydrOXYzine, levothyroxine, and ondansetron ODT    Allergies:  Allergies   Allergen Reactions   • Ct Contrast Anaphylaxis     3/8/25 pre medicated with solumedrol & benadryl, pt reacted, gave 0.3 epi   • Tramadol Other (See Comments), Unknown (See Comments) and Headache     MIGRAINES   • Ibuprofen Nausea And Vomiting and Other (See Comments)     ULCER    Other reaction(s): Nausea and vomiting   ULCER       Objective    Objective     Vitals:   Temp:  [98.3 °F (36.8 °C)] 98.3 °F (36.8 °C)  Heart Rate:  [] 102  Resp:  [18-35] 19  BP: ()/(51-80) 95/66  Flow (L/min) (Oxygen Therapy):  [8-55] 55    Physical Exam:  Vital Signs Reviewed   Morbidly obese female, somnolent, arousable  HEENT:  PERRL, EOMI.  OP, nares clear, Mallampati classification 4  Chest:  good aeration, clear to auscultation bilaterally, no work of breathing noted  CV: RRR, no MGR, pulses 2+, equal  Abd:  Soft, NT, ND, + BS, no HSM  EXT:  no clubbing, no cyanosis, no edema, no joint tenderness  Neuro:  A&Ox3, CN grossly intact, no focal deficits  Skin: No rashes or lesions noted      Result Review    Result Review:  I have personally reviewed the results from the time of this admission to 3/8/2025 06:23 EST and agree with these findings:  [x]  Laboratory  [x]  Microbiology  [x]  Radiology  [x]  EKG/Telemetry   [x]  Cardiology/Vascular   []  Pathology  []  Old records  []  Other:  Most notable findings include:       Lab 03/08/25  0818 03/07/25  2219 03/02/25  1813   WBC 16.68* 7.83 8.37   HEMOGLOBIN 14.9 13.3 14.4   HEMATOCRIT 46.0 39.9 42.6    PLATELETS 381 295 290   SODIUM 138 141 139   POTASSIUM 3.1* 4.1 3.7   CHLORIDE 107 106 103   CO2 16.7* 24.2 24.5   BUN 14 12 11   CREATININE 1.36* 1.07* 1.07*   GLUCOSE 272* 125* 129*   CALCIUM 7.7* 9.1 9.5   PHOSPHORUS 2.2*  --   --    TOTAL PROTEIN  --  7.1 7.9   ALBUMIN  --  3.6 4.0   GLOBULIN  --  3.5 3.9         Assessment & Plan   Assessment / Plan     Active Hospital Problems:  Active Hospital Problems    Diagnosis    • **Contrast media allergy    • Chronic pain    • Peripheral artery disease    • Hypoxic respiratory failure    • Anaphylactic reaction    Acute hypoxic respiratory failure      Plan:  Continue supplemental O2.  Currently patient on Airvo.  Titrate to maintain SpO2 90% or greater  Continue scheduled steroids  Speech therapy on board.  Diet recommendations per them\  Continue epinephrine, wean per parameters.  Goal MAP 65 or greater  Continue scheduled Pepcid  Continue scheduled nebulizers  Continue maintenance fluids with normal saline at 100  Trend renal function electrolytes.  Replace as needed  Monitor for any urinary compromise including drooling or hoarseness of voice.  DVT prophylaxis with heparin      Patient is critically ill in ICU with anaphylactic reaction, acute hypoxic respiratory failure requiring Airvo  I spent 33 minutes of critical care time, excluding any procedure notes, in review, analysis, obtaining history and physical, formulating care plan, and I led multi-disciplinary critical care rounds with bedside nurse, respiratory therapist, clinical pharmacist and other allied services. I have discussed the case with primary service and other consultants as well.     Electronically signed by William Lomeli MD, 3/8/2025, 06:23 EST.

## 2025-03-08 NOTE — PLAN OF CARE
Goal Outcome Evaluation:  Plan of Care Reviewed With: patient, spouse      ASSESSMENT/ PLAN OF CARE:  Pt presents with limitations, noted below, that impede her ability to swallow safely and maintain nutrition. The skills of a therapist will be required to safely and effectively implement the following treatment plan to restore maximal level of function.    PROBLEMS:  1.   Swallow delay, risk of aspiration                       LTG 1: 30 days: Patient will tolerate least restrictive diet utilizing appropriate positioning and strategies with minimal assistance.                       STG 1a: 14 days: Patient will tolerate diet of mechanical ground solids and thin liquids with minimal assistance for strategies.                       STG 1b: 14 days: Patient will tolerate 8 of 10 trials of thin liquids with min to no signs or symptoms of aspiration.                       STG 1c: 14 days:  Patient/family education.                       TREATMENT: Dysphagia therapy to address swallow function through exercises and education of strategies.     FREQUENCY/DURATION: Once daily 5 times per week    REHAB POTENTIAL:  Pt has fair to good rehab potential.  The following limitations may influence improvement/ length of tx: Medical status.    RECOMMENDATIONS:   1.   DIET: Mechanical ground solids, thin liquid    2.  POSITION: Positioning fully upright for all p.o. intake and 30 minutes following.    3.  COMPENSATORY STRATEGIES: Assist patient with set up, assist patient to feed self.  Alternate small bites and small sips of solids and liquids at a slow rate.  Cue for slow rate with drinking.  Medications whole in applesauce first 48 hours.          Anticipated Discharge Disposition (SLP): home

## 2025-03-08 NOTE — Clinical Note
[address]  Department of Emergency Medicine   ED  Encounter Note  Admit Date/RoomTime: 2022 10:06 PM  ED Room: [facility]    NAME: Shawnee Hernandez  : 1989  MRN: [id number]     Chief Complaint:  Illness (C/o sinus congestion, N/V/D, cough, runny nose. Patient states his kids were sick recently. )    History of Present Illness       Shawnee Hernandez is a 34 y.o. old male who presents to the emergency department by private vehicle, for nasal congestion and cough, which began 2 day(s) prior to arrival.  Since onset the symptoms have been stable and mild-moderate in severity. The symptoms are associated with several episodes of post tussive emesis. There has been no additional symptoms as it relates to today's visit. He denies any chest pain, shortness of breath, leg edema or hemoptysis. He has been taking DayQuil at his symptoms. ROS   Pertinent positives and negatives are stated within HPI, all other systems reviewed and are negative. Past Medical History:  has a past medical history of Trichimoniasis. Surgical History:  has no past surgical history on file. Social History:  reports that he has been smoking cigars. He has never used smokeless tobacco. He reports current alcohol use of about 3.0 standard drinks per week. He reports that he does not use drugs. Family History: family history is not on file. Allergies: Patient has no known allergies. Physical Exam   Oxygen Saturation Interpretation: Normal on room air analysis. ED Triage Vitals   BP Temp Temp src Heart Rate Resp SpO2 Height Weight   222 228 -- 22   113/68 98.1 °F (36.7 °C)  77 18 99 % 5' 11\" (1.803 m) 185 lb (83.9 kg)         Constitutional:  Alert, development consistent with age.   Ears:  External Ears: Bilateral normal.               TM's & External Canals: normal TM's and external ear Level of Care: Telemetry [5]   Diagnosis: Contrast media allergy [511331]   Certification: I Certify That Inpatient Hospital Services Are Medically Necessary For Greater Than 2 Midnights   canals bilaterally. Nose:   There is no discharge, swelling or lesions noted. Sinuses: no bilateral maxillary sinus tenderness. no bilateral frontal sinus tenderness. Mouth:  normal tongue and buccal mucosa. Throat: no erythema or exudates noted. Teeth and gums normal..  Airway patent. Neck:  Supple. No meningeal signs. There is no  preauricular, anterior cervical, and posterior cervical node tenderness. Respiratory:   Breath sounds: bilateral normal.  Lung sounds: normal.   CV:  Regular rate and rhythm, normal heart sounds, without pathological murmurs, ectopy, gallops, or rubs. GI:  Abdomen Soft, nontender, good bowel sounds. No firm or pulsatile mass. Integument:  Normal turgor. Warm, dry, without visible rash. Neurological:  Oriented. Motor functions intact. Lab / Imaging Results   (All laboratory and radiology results have been personally reviewed by myself)  Labs:  Results for orders placed or performed during the hospital encounter of 09/27/22   COVID-19, Rapid    Specimen: Nasopharyngeal Swab   Result Value Ref Range    SARS-CoV-2, NAAT Not Detected Not Detected       Imaging: All Radiology results interpreted by Radiologist unless otherwise noted. XR CHEST (2 VW)   Final Result   No acute process. ED Course / Medical Decision Making     Medications   guaiFENesin-dextromethorphan (ROBITUSSIN DM) 100-10 MG/5ML syrup 10 mL (10 mLs Oral Given 9/27/22 2228)   ondansetron (ZOFRAN-ODT) disintegrating tablet 4 mg (4 mg Oral Given 9/27/22 2228)         Consults:   None    Procedures:   none    Medical Decision Making:   Rose Celeste presented to ED with complaints of Illness (C/o sinus congestion, N/V/D, cough, runny nose. Patient states his kids were sick recently. )      With moderate suspicion for pneumonia as per history/physical findings, imaging was done, negative. With moderate suspicion for COVID-19, testing was obtained and were negative.      Based on history and examination findings of Genette Peed, the causative nature of illness is likely to be Viral in etiology. Therefore, symptomatic control with supportive meds is considered appropriate at this time. He is not hypoxic. Patient is well appearing, non toxic, meets criteria for and is appropriate for outpatient management. No emesis in ED, tolerating PO fluids and medications. Normal progression of disease discussed. Explained the rationale for symptomatic treatment rather than use of an antibiotic. Analgesics as needed, dosages and times reviewed. Follow up as needed should symptoms fail to improve. Assessment     1. Viral URI with cough    2. Post-tussive emesis      Plan   Discharged home. Patient condition is good    New Medications     Discharge Medication List as of 9/27/2022 11:39 PM        START taking these medications    Details   guaiFENesin-dextromethorphan (ROBITUSSIN DM) 100-10 MG/5ML syrup Take 10 mLs by mouth 3 times daily as needed for Cough, Disp-120 mL, R-0Print      fluticasone (FLONASE) 50 MCG/ACT nasal spray 1 spray by Each Nostril route daily, Disp-16 g, R-0Print      acetaminophen (TYLENOL) 500 MG tablet Take 2 tablets by mouth every 6 hours as needed for Pain Maximum dose- 8 tablets/24 hours. , Disp-120 tablet, R-0Print           Electronically signed by MACK Brewster CNP   DD: 9/27/22  **This report was transcribed using voice recognition software. Every effort was made to ensure accuracy; however, inadvertent computerized transcription errors may be present.   END OF ED PROVIDER NOTE        MACK Shen CNP  09/28/22 0003

## 2025-03-08 NOTE — PROGRESS NOTES
[facility]   Hospitalist Progress Note  Date: 3/8/2025  Patient Name: Velma Andres  : 1975  MRN: [id number]  Date of admission: 3/8/2025  Consultants:   -Pulmonology: Dr. William Lomeli    Subjective   Subjective     Chief Complaint: Anaphylactic reaction to IV contrast    Summary:   Velma Andres is a 50 y.o. female with history of leg stent at [facility] for peripheral artery disease about 2 months prior to this presentation that presented with left leg pain for a week along with numbness and tingling and swelling of the leg.  Doppler ultrasound was negative.  CT angiogram done that showed patent stent and no other abnormalities, patient was premedicated with IV Solu-Medrol for CT scan given contrast allergy, however, he started develop more wheezing and worsening shortness of breath.  Patient given epinephrine and DuoNeb as well as IV fluids but the symptoms did not improve.  Patient had been restarted on Airvo and also noted to have labile blood pressure.  Patient admitted to ICU level of care.  Pulmonology consulted.  Patient started on epinephrine drip.    Interval Followup:   Patient remains on epinephrine drip.  Also continues on Airvo.    Objective   Objective     Vitals:   Temp:  [97 °F (36.1 °C)-98.3 °F (36.8 °C)] 98.2 °F (36.8 °C)  Heart Rate:  [] 106  Resp:  [16-35] 16  BP: ()/(42-80) 116/47  Flow (L/min) (Oxygen Therapy):  [8-55] 35  Physical Exam   Gen: Acutely ill-appearing, sitting up in bed on Airvo  Resp: Equal chest rise bilaterally, good aeration  Card: Regular rhythm, tachycardic, No m/r/g  Abd: Soft, Nontender, Nondistended, + bowel sounds    Result Review    Result Review:  I have personally reviewed the results as below and agree with these findings:  []  Laboratory:   CMP          3/2/2025    18:13 3/7/2025    22:19 3/8/2025    08:18   CMP   Glucose 129  125  272    BUN 11  12  14    Creatinine 1.07  1.07  1.36    EGFR 64.2  64.2  48.1    Sodium 139   141  138    Potassium 3.7  4.1  3.1    Chloride 103  106  107    Calcium 9.5  9.1  7.7    Total Protein 7.9  7.1     Albumin 4.0  3.6     Globulin 3.9  3.5     Total Bilirubin 0.3  <0.2     Alkaline Phosphatase 101  90     AST (SGOT) 19  20     ALT (SGPT) 20  18     Albumin/Globulin Ratio 1.0  1.0     BUN/Creatinine Ratio 10.3  11.2  10.3    Anion Gap 11.5  10.8  14.3      CBC          3/2/2025    18:13 3/7/2025    22:19 3/8/2025    08:18   CBC   WBC 8.37  7.83  16.68    RBC 4.90  4.57  5.10    Hemoglobin 14.4  13.3  14.9    Hematocrit 42.6  39.9  46.0    MCV 86.9  87.3  90.2    MCH 29.4  29.1  29.2    MCHC 33.8  33.3  32.4    RDW 12.5  12.6  12.9    Platelets 290  295  381      []  Microbiology:   []  Radiology:   [x]  EKG/Telemetry:    []  Cardiology/Vascular:    []  Pathology:  []  Old records:  []  Other:    Assessment & Plan   Assessment / Plan     Assessment:  Anaphylactic reaction to contrast media  Acute hypoxic respiratory failure secondary to above  Chronic pain  Peripheral artery disease  Hypothyroidism  Anxiety  Obesity (BMI: 34)    Plan:  -Pulmonology consulted and following, appreciate assistance and recommendations in the care of this patient.  -Continue epinephrine drip  -Continue Airvo, wean as tolerated  -Start appropriate home medication  -Continue IV fluids  -Continue scheduled steroids  -Remain in ICU  -Will monitor electrolytes and renal function with BMP and magnesium level in the AM  -Will monitor WBC and Hgb with CBC in the AM  -Clinical course will dictate further management     DVT Prophylaxis: Heparin  GI Prophylaxis: Famotidine  Diet:   Diet Order   Procedures    Diet: Cardiac; Healthy Heart (2-3 Na+); Texture: Mechanical Ground (NDD 2); Fluid Consistency: Thin (IDDSI 0)     Dispo: Remain in ICU     Personally reviewed patient's labs and imaging, discussed with patient and nurse at bedside. Discussed management with the following consultants: Pulmonology.     Part of this note may be an  electronic transcription/translation of spoken language to printed text using the Dragon dictation system.    VTE Prophylaxis:  Pharmacologic & mechanical VTE prophylaxis orders are present.        CODE STATUS:   Code Status (Patient has no pulse and is not breathing): CPR (Attempt to Resuscitate)  Medical Interventions (Patient has pulse or is breathing): Full Support        Electronically signed by Rodriguez Gray MD, 3/8/2025, 16:42 EST.   [FreeTextEntry3] : I have seen the patient and reviewed the case together with PA and I agree with the final recommendations and plan of care.\par \par Ike Charles MD\par Neurosurgery\par \par  [Time Spent: ___ minutes] : I have spent [unfilled] minutes of time on the encounter. [>50% of the face to face encounter time was spent on counseling and/or coordination of care for ___] : Greater than 50% of the face to face encounter time was spent on counseling and/or coordination of care for [unfilled]

## 2025-03-08 NOTE — ED PROVIDER NOTES
Time: 10:11 PM EST  Date of encounter:  3/7/2025  Independent Historian/Clinical History and Information was obtained by:   Patient    History is limited by: N/A    Chief Complaint: Leg numbness      History of Present Illness:  Patient is a 50 y.o. year old female who presents to the emergency department for evaluation of left leg pain.   Patient had stent placed in the left leg by [facility] vascular surgery for peripheral arterial disease in January.  The patient notes that initially after surgery she did well.  However, the patient notes that she has had increasing resting pain of the left lower extremity for about a week.  She does note some numbness and tingling.  She also feels that her foot is slightly swollen.  Patient denies any back pain.  The patient denies any coldness.  The patient denies any discoloration.  Patient denies any abdominal pain.  Patient denies any chest pain or chest pressure.      Patient Care Team  Primary Care Provider: Jignesh Doe    Past Medical History:     Allergies   Allergen Reactions    Ct Contrast Anaphylaxis     3/8/25 pre medicated with solumedrol & benadryl, pt reacted, gave 0.3 epi    Tramadol Other (See Comments), Unknown (See Comments) and Headache     MIGRAINES    Ibuprofen Nausea And Vomiting and Other (See Comments)     ULCER    Other reaction(s): Nausea and vomiting   ULCER     Past Medical History:   Diagnosis Date    Anxiety     Cervix cancer     TREATED NO REOCCURANCE    Hernia of abdominal wall     Kidney stone     Thyroid condition     Umbilical hernia      Past Surgical History:   Procedure Laterality Date    KIDNEY STONE SURGERY      LEG SURGERY      vein repair    VENTRAL HERNIA REPAIR N/A 12/19/2024    Procedure: Robotic umbilical hernia repair-repair hernia at umbilicus with small incisions, camera and robotic instruments;  Surgeon: Devin Abdi MD;  Location: [facility] OR [facility] [place];  Service: Robotics - [facility];  Laterality: N/A;     Family History   Problem  Relation Age of Onset    Malig Hyperthermia Neg Hx        Home Medications:  Prior to Admission medications    Medication Sig Start Date End Date Taking? Authorizing Provider   ALPRAZolam (XANAX) 2 MG tablet Take 1 tablet by mouth 3 (Three) Times a Day As Needed. 10/11/24   Julissa Reed MD   cloNIDine (CATAPRES) 0.1 MG tablet Take 1 tablet by mouth Every 12 (Twelve) Hours. 10/11/24   Julissa Reed MD   gabapentin (NEURONTIN) 600 MG tablet Take 1 tablet by mouth 3 (Three) Times a Day.    Julissa Reed MD   hydrOXYzine (ATARAX) 50 MG tablet Take 1 tablet by mouth 2 (Two) Times a Day As Needed. 10/11/24   Julissa Reed MD   levothyroxine (SYNTHROID, LEVOTHROID) 100 MCG tablet Take 1 tablet by mouth Every Night. 8/30/24   Julissa Reed MD   ondansetron ODT (ZOFRAN-ODT) 4 MG disintegrating tablet Place 1 tablet on the tongue Every 6 (Six) Hours As Needed for Nausea or Vomiting. 1/1/25   Waldemar Rush MD        Social History:   Social History     Tobacco Use    Smoking status: Every Day     Current packs/day: 1.00     Types: Cigarettes    Smokeless tobacco: Never    Tobacco comments:     INST PER ANESTHESIA PROTOCOL   Vaping Use    Vaping status: Never Used   Substance Use Topics    Alcohol use: Not Currently    Drug use: Never         Review of Systems:  Review of Systems   Constitutional:  Negative for chills, diaphoresis and fever.   HENT:  Negative for congestion, postnasal drip, rhinorrhea and sore throat.    Eyes:  Negative for photophobia.   Respiratory:  Negative for cough, chest tightness and shortness of breath.    Cardiovascular:  Negative for chest pain, palpitations and leg swelling.   Gastrointestinal:  Negative for abdominal pain, diarrhea, nausea and vomiting.   Genitourinary:  Negative for difficulty urinating, dysuria, flank pain, frequency, hematuria and urgency.   Musculoskeletal:  Positive for myalgias. Negative for neck pain and neck stiffness.   Skin:   "Negative for pallor and rash.   Neurological:  Positive for numbness. Negative for dizziness, syncope, weakness and headaches.   Hematological:  Negative for adenopathy. Does not bruise/bleed easily.   Psychiatric/Behavioral: Negative.          Physical Exam:  BP 90/71   Pulse 75   Temp 97.5 °F (36.4 °C) (Oral)   Resp 22   Ht 154.9 cm (61\")   Wt 82.6 kg (182 lb 1.6 oz)   SpO2 96%   BMI 34.41 kg/m²     Physical Exam  Vitals and nursing note reviewed.   Constitutional:       General: She is not in acute distress.     Appearance: Normal appearance. She is not ill-appearing, toxic-appearing or diaphoretic.   HENT:      Head: Normocephalic and atraumatic.      Mouth/Throat:      Mouth: Mucous membranes are moist.   Eyes:      Pupils: Pupils are equal, round, and reactive to light.   Cardiovascular:      Rate and Rhythm: Normal rate and regular rhythm.      Pulses: Normal pulses.           Carotid pulses are 2+ on the right side and 2+ on the left side.       Radial pulses are 2+ on the right side and 2+ on the left side.        Femoral pulses are 2+ on the right side and 2+ on the left side.       Dorsalis pedis pulses are 2+ on the right side and 1+ on the left side.        Posterior tibial pulses are 1+ on the right side and 0 on the left side.      Heart sounds: Normal heart sounds. No murmur heard.  Pulmonary:      Effort: Pulmonary effort is normal. No accessory muscle usage, respiratory distress or retractions.      Breath sounds: Normal breath sounds. No wheezing, rhonchi or rales.   Abdominal:      General: Abdomen is flat. There is no distension.      Palpations: Abdomen is soft. There is no mass or pulsatile mass.      Tenderness: There is no abdominal tenderness. There is no right CVA tenderness, left CVA tenderness, guarding or rebound.      Comments: No rigidity   Musculoskeletal:         General: Tenderness present. No swelling or deformity.      Cervical back: Neck supple. No tenderness.      Right " lower leg: No edema.      Left lower leg: Edema present.   Skin:     General: Skin is warm and dry.      Capillary Refill: Capillary refill takes less than 2 seconds.      Coloration: Skin is not jaundiced or pale.      Findings: No erythema.   Neurological:      General: No focal deficit present.      Mental Status: She is alert and oriented to person, place, and time. Mental status is at baseline.      Cranial Nerves: Cranial nerves 2-12 are intact. No cranial nerve deficit.      Sensory: Sensation is intact. No sensory deficit.      Motor: Motor function is intact. No weakness or pronator drift.      Coordination: Coordination is intact. Coordination normal.   Psychiatric:         Mood and Affect: Mood normal.         Behavior: Behavior normal.                  Medical Decision Making:      Comorbidities that affect care:    Anxiety, peripheral arterial disease peripheral neuropathy, hypothyroid    External Notes reviewed:    None      The following orders were placed and all results were independently analyzed by me:  Orders Placed This Encounter   Procedures    CT Angio Abdominal Aorta Bilateral Iliofem Runoff    Comprehensive Metabolic Panel    CBC Auto Differential    Lactic Acid, Plasma    Blood Gas, Arterial -    CBC Auto Differential    Basic Metabolic Panel    Magnesium    Phosphorus    High Sensitivity Troponin T    High Sensitivity Troponin T 1Hr    CBC (No Diff)    Magnesium    Discharge Follow-up with PCP    Activity as Tolerated    Diet: Regular/House Diet; Regular (IDDSI 7); Thin (IDDSI 0)    Inpatient Intensivist Consult    SLP Plan of Care Cert / Re-Cert    ECG 12 Lead Tachycardia    ECG 12 Lead Rhythm Change    Inpatient Admission    Discharge patient    CBC & Differential       Medications Given in the Emergency Department:  Medications   ketorolac (TORADOL) injection 30 mg (30 mg Intramuscular Given 3/7/25 0097)   diphenhydrAMINE (BENADRYL) 50 MG/ML injection  - ADS Override Pull (  Given  3/8/25 0141)   methylPREDNISolone sodium succinate (SOLU-Medrol) 125 MG injection  - ADS Override Pull (  Given 3/8/25 0141)   famotidine (PEPCID) 10 MG/ML injection  - ADS Override Pull (  Given 3/8/25 0141)   sterile water (preservative free) injection  - ADS Override Pull (  Given 3/8/25 0141)   morphine 4 MG/ML injection  - ADS Override Pull (  Given 3/8/25 0225)   ondansetron (ZOFRAN) 2 mg/mL injection  - ADS Override Pull (  Given 3/8/25 0146)   iopamidol (ISOVUE-370) 76 % injection 150 mL (150 mL Intravenous Given 3/8/25 0333)   EPINEPHrine (ADRENALIN) injection 0.3 mg (0.3 mg Intramuscular Given 3/8/25 0337)   ondansetron (ZOFRAN) injection 4 mg (4 mg Intravenous Given 3/8/25 0347)   sodium chloride 0.9 % bolus 500 mL (0 mL Intravenous Stopped 3/8/25 0524)   ipratropium-albuterol (DUO-NEB) nebulizer solution 3 mL (3 mL Nebulization Given 3/8/25 0350)   ipratropium-albuterol (DUO-NEB) nebulizer solution 3 mL (3 mL Nebulization Given 3/8/25 0350)   ipratropium-albuterol (DUO-NEB) nebulizer solution 3 mL (3 mL Nebulization Given 3/8/25 0421)   sodium chloride 0.9 % bolus 1,000 mL (1,000 mL Intravenous New Bag 3/8/25 0615)   potassium chloride (MICRO-K/KLOR-CON) CR capsule (40 mEq Oral Given 3/8/25 2000)   potassium phosphate 15 mmol in 0.9% normal saline 250 mL IVPB (15 mmol Intravenous New Bag 3/8/25 1715)   Sodium Phosphates-Dextrose IVPB 15 mmol (15 mmol Intravenous Given 3/9/25 0529)   insulin regular (humuLIN R,novoLIN R) injection 5 Units (5 Units Subcutaneous Given 3/9/25 0529)   magnesium sulfate 2g/50 mL (PREMIX) infusion (2 g Intravenous New Bag 3/9/25 0839)        ED Course:    ED Course as of 03/12/25 0150   Fri Mar 07, 2025   2214 PROVIDER IN TRIAGE  Patient was evaluated by me in triage, Bailey Seaver, APRN, FNP-C.  Orders were placed and patient is currently awaiting final results and disposition.   [AS]   Sat Mar 08, 2025   0504 EKG:    Rhythm: Sinus tachycardia  Rate: 103  Intervals: Normal NC  and QT interval  T-wave: Nonspecific T wave flattening  ST Segment: No pathological ST elevation or reciprocal ST depression to suggest STEMI    EKG Comparison: No EKG available for comparison    Interpreted by me   [SD]      ED Course User Index  [AS] Seaver, Alyce B, APRN  [SD] Prasad Lal DO       Labs:    Lab Results (last 24 hours)       ** No results found for the last 24 hours. **             Imaging:    No Radiology Exams Resulted Within Past 24 Hours        Differential Diagnosis and Discussion:    Paresthesia: Differential diagnosis includes but is not limited to acute stroke, electrolyte imbalance, anxiety, radiculopathy, autoimmune disorders, and endocrine disorders.    PROCEDURES:    Labs were collected in the emergency department and all labs were reviewed and interpreted by me.    ECG 12 Lead Rhythm Change   Preliminary Result   HEART RATE=98  bpm   RR Ybeixbmu=388  ms   IA Dmqdqjac=579  ms   P Horizontal Axis=15  deg   P Front Axis=62  deg   QRSD Interval=82  ms   QT Ehyaqqfp=860  ms   VGeJ=060  ms   QRS Axis=-28  deg   T Wave Axis=16  deg   - ABNORMAL ECG -   Sinus rhythm   Inferior  infarct, old   Prolonged QT interval   Date and Time of Study:2025-03-08 10:45:09      ECG 12 Lead Tachycardia   Preliminary Result   HEART UISY=446  bpm   RR Atyefxdo=679  ms   IA Uruotndt=601  ms   P Horizontal Axis=18  deg   P Front Axis=70  deg   QRSD Interval=75  ms   QT Tnfzaemj=694  ms   IBpP=604  ms   QRS Axis=-40  deg   T Wave Axis=60  deg   - ABNORMAL ECG -   Sinus tachycardia   Inferior infarct, old   Anterior infarct, old   Date and Time of Study:2025-03-08 03:52:03          Procedures    MDM  Number of Diagnoses or Management Options  Acute bronchospasm  Acute respiratory failure with hypoxia  Allergic reaction to contrast material, initial encounter  Anaphylaxis, initial encounter  Diagnosis management comments: The patient's CBC was reviewed and shows no abnormalities of critical concern.  Of note,  there is no anemia requiring a blood transfusion and the platelet count is acceptable    The patient's CMP was reviewed and shows no abnormalities of critical concern.  Of note, the patient's sodium and potassium are acceptable.  The patient's liver enzymes are unremarkable.  The patient's renal function including creatinine is preserved.  The patient has a normal anion gap.    The patient had a duplex Doppler of the left lower extremity troll DVT ordered by the nurse practitioner in triage.  There is no evidence of DVT or superficial thrombophlebitis    I Discussed the need for CT angiogram of the left lower extremity.  The patient states that she does have a contrast allergy but if she is premedicated prior to the study she has no difficulties.  We did premedicate the patient with Solu-Medrol Pepcid and Benadryl.    The patient was given morphine for pain and Zofran for nausea    Patient was premedicated for the CT angiogram.    When the patient returned from CT, the patient states that she was having difficulty breathing.  Was tachycardic on the monitor and her blood pressure was low.  The patient had diffuse wheezing throughout her lungs.  The patient's room air pulse ox was low as well.  The patient was placed on nasal cannula which improved her oxygenation.  The patient was immediately given 0.3 mg of epinephrine IM.  Respiratory was called and the patient was started on DuoNebs.  Patient was given Zofran for nausea and a 500 cc bolus.  The patient appeared to respond well to therapy.    The patient was resting more comfortably after treatment.  We will switch the patient to air Vo.Patient's blood pressure was 98/66.  Heart rate was 120s.  Saturations are 94%.    The patient is stable at the time of admission.   The patient is in no respiratory distress and appears to be resting comfortably.  The patient is at their baseline mental status.  Patient remains tachycardic.  The patient's blood pressure is  normal       Amount and/or Complexity of Data Reviewed  Clinical lab tests: reviewed and ordered  Tests in the radiology section of CPT®: reviewed and ordered  Tests in the medicine section of CPT®: reviewed and ordered  Discuss the patient with other providers: yes (05:15 EST  I discussed the case with the hospitalist, Dr. Sanchez.  We have discussed the patient's presenting symptoms, laboratory values, imaging and condition at the time of admission.  They will evaluate the patient in the emergency room and admit the patient to the hospital)    Critical Care  Total time providing critical care: 35 minutes (Total critical care time of.  Total critical care time documented does not include time spent on separately billed procedures for services of nurses or physician assistants.  I personally saw and examined the patient.  I have reviewed all diagnostic interpretations and treatment plans as written.  I was present for the key portions of any procedures performed and the inclusive time noted in any critical care statement.  Critical care time includes patient management by me, time spent at the patient bedside, time to review labs/ ABG's, imaging results, discussing patient care, documentation in the medical record and time spent with family or caregiver)             Social Determinants of Health:    Patient is independent, reliable, and has access to care.       Disposition and Care Coordination:    Admit:   Through independent evaluation of the patient's history, physical, and imperical data, the patient meets criteria for inpatient admission to the hospital.        Final diagnoses:   Acute respiratory failure with hypoxia   Allergic reaction to contrast material, initial encounter   Acute bronchospasm   Anaphylaxis, initial encounter        ED Disposition       ED Disposition   Decision to Admit    Condition   --    Comment   Level of Care: Critical Care [6]   Diagnosis: Anaphylactic reaction [803023]    Certification: I Certify That Inpatient Hospital Services Are Medically Necessary For Greater Than 2 Midnights                 This medical record created using voice recognition software.             Prasad Lal,   03/12/25 015

## 2025-03-09 ENCOUNTER — READMISSION MANAGEMENT (OUTPATIENT)
Dept: CALL CENTER | Facility: HOSPITAL | Age: 50
End: 2025-03-09
Payer: MEDICARE

## 2025-03-09 VITALS
OXYGEN SATURATION: 96 % | HEART RATE: 75 BPM | TEMPERATURE: 97.5 F | SYSTOLIC BLOOD PRESSURE: 90 MMHG | BODY MASS INDEX: 34.38 KG/M2 | DIASTOLIC BLOOD PRESSURE: 71 MMHG | WEIGHT: 182.1 LBS | HEIGHT: 61 IN | RESPIRATION RATE: 22 BRPM

## 2025-03-09 LAB
ALBUMIN SERPL-MCNC: 3.3 G/DL (ref 3.5–5.2)
ALBUMIN/GLOB SERPL: 1.1 G/DL
ALP SERPL-CCNC: 71 U/L (ref 39–117)
ALT SERPL W P-5'-P-CCNC: 21 U/L (ref 1–33)
ANION GAP SERPL CALCULATED.3IONS-SCNC: 10 MMOL/L (ref 5–15)
AST SERPL-CCNC: 22 U/L (ref 1–32)
BILIRUB SERPL-MCNC: <0.2 MG/DL (ref 0–1.2)
BUN SERPL-MCNC: 13 MG/DL (ref 6–20)
BUN/CREAT SERPL: 11.9 (ref 7–25)
CALCIUM SPEC-SCNC: 8 MG/DL (ref 8.6–10.5)
CHLORIDE SERPL-SCNC: 114 MMOL/L (ref 98–107)
CO2 SERPL-SCNC: 16 MMOL/L (ref 22–29)
CREAT SERPL-MCNC: 1.09 MG/DL (ref 0.57–1)
DEPRECATED RDW RBC AUTO: 44.5 FL (ref 37–54)
EGFRCR SERPLBLD CKD-EPI 2021: 62.8 ML/MIN/1.73
ERYTHROCYTE [DISTWIDTH] IN BLOOD BY AUTOMATED COUNT: 13.2 % (ref 12.3–15.4)
GLOBULIN UR ELPH-MCNC: 2.9 GM/DL
GLUCOSE BLDC GLUCOMTR-MCNC: 84 MG/DL (ref 70–99)
GLUCOSE SERPL-MCNC: 264 MG/DL (ref 65–99)
HCT VFR BLD AUTO: 37.4 % (ref 34–46.6)
HGB BLD-MCNC: 12.3 G/DL (ref 12–15.9)
MAGNESIUM SERPL-MCNC: 1.8 MG/DL (ref 1.6–2.6)
MCH RBC QN AUTO: 30.1 PG (ref 26.6–33)
MCHC RBC AUTO-ENTMCNC: 32.9 G/DL (ref 31.5–35.7)
MCV RBC AUTO: 91.7 FL (ref 79–97)
PHOSPHATE SERPL-MCNC: 1.5 MG/DL (ref 2.5–4.5)
PLATELET # BLD AUTO: 252 10*3/MM3 (ref 140–450)
PMV BLD AUTO: 9.5 FL (ref 6–12)
POTASSIUM SERPL-SCNC: 5.2 MMOL/L (ref 3.5–5.2)
PROT SERPL-MCNC: 6.2 G/DL (ref 6–8.5)
RBC # BLD AUTO: 4.08 10*6/MM3 (ref 3.77–5.28)
SODIUM SERPL-SCNC: 140 MMOL/L (ref 136–145)
WBC NRBC COR # BLD AUTO: 24.46 10*3/MM3 (ref 3.4–10.8)

## 2025-03-09 PROCEDURE — 25010000003 DEXTROSE 5 % SOLUTION: Performed by: STUDENT IN AN ORGANIZED HEALTH CARE EDUCATION/TRAINING PROGRAM

## 2025-03-09 PROCEDURE — 99233 SBSQ HOSP IP/OBS HIGH 50: CPT | Performed by: INTERNAL MEDICINE

## 2025-03-09 PROCEDURE — 99239 HOSP IP/OBS DSCHRG MGMT >30: CPT | Performed by: INTERNAL MEDICINE

## 2025-03-09 PROCEDURE — 83735 ASSAY OF MAGNESIUM: CPT | Performed by: INTERNAL MEDICINE

## 2025-03-09 PROCEDURE — 63710000001 INSULIN REGULAR HUMAN PER 5 UNITS: Performed by: STUDENT IN AN ORGANIZED HEALTH CARE EDUCATION/TRAINING PROGRAM

## 2025-03-09 PROCEDURE — 25010000002 MAGNESIUM SULFATE 2 GM/50ML SOLUTION: Performed by: PHYSICIAN ASSISTANT

## 2025-03-09 PROCEDURE — 25010000002 METHYLPREDNISOLONE PER 40 MG: Performed by: STUDENT IN AN ORGANIZED HEALTH CARE EDUCATION/TRAINING PROGRAM

## 2025-03-09 PROCEDURE — 25810000003 SODIUM CHLORIDE 0.9 % SOLUTION: Performed by: STUDENT IN AN ORGANIZED HEALTH CARE EDUCATION/TRAINING PROGRAM

## 2025-03-09 PROCEDURE — 84100 ASSAY OF PHOSPHORUS: CPT | Performed by: PHYSICIAN ASSISTANT

## 2025-03-09 PROCEDURE — 80053 COMPREHEN METABOLIC PANEL: CPT | Performed by: PHYSICIAN ASSISTANT

## 2025-03-09 PROCEDURE — 25010000002 HEPARIN (PORCINE) PER 1000 UNITS: Performed by: STUDENT IN AN ORGANIZED HEALTH CARE EDUCATION/TRAINING PROGRAM

## 2025-03-09 PROCEDURE — 94799 UNLISTED PULMONARY SVC/PX: CPT

## 2025-03-09 PROCEDURE — 25010000002 DIPHENHYDRAMINE PER 50 MG: Performed by: STUDENT IN AN ORGANIZED HEALTH CARE EDUCATION/TRAINING PROGRAM

## 2025-03-09 PROCEDURE — 82948 REAGENT STRIP/BLOOD GLUCOSE: CPT | Performed by: STUDENT IN AN ORGANIZED HEALTH CARE EDUCATION/TRAINING PROGRAM

## 2025-03-09 PROCEDURE — 85027 COMPLETE CBC AUTOMATED: CPT | Performed by: INTERNAL MEDICINE

## 2025-03-09 RX ORDER — MAGNESIUM SULFATE HEPTAHYDRATE 40 MG/ML
2 INJECTION, SOLUTION INTRAVENOUS ONCE
Status: COMPLETED | OUTPATIENT
Start: 2025-03-09 | End: 2025-03-09

## 2025-03-09 RX ORDER — INSULIN LISPRO 100 [IU]/ML
2-7 INJECTION, SOLUTION INTRAVENOUS; SUBCUTANEOUS
Status: DISCONTINUED | OUTPATIENT
Start: 2025-03-09 | End: 2025-03-09 | Stop reason: HOSPADM

## 2025-03-09 RX ORDER — NICOTINE POLACRILEX 4 MG
15 LOZENGE BUCCAL
Status: DISCONTINUED | OUTPATIENT
Start: 2025-03-09 | End: 2025-03-09 | Stop reason: HOSPADM

## 2025-03-09 RX ORDER — IBUPROFEN 600 MG/1
1 TABLET ORAL
Status: DISCONTINUED | OUTPATIENT
Start: 2025-03-09 | End: 2025-03-09 | Stop reason: HOSPADM

## 2025-03-09 RX ORDER — DEXTROSE MONOHYDRATE 25 G/50ML
25 INJECTION, SOLUTION INTRAVENOUS
Status: DISCONTINUED | OUTPATIENT
Start: 2025-03-09 | End: 2025-03-09 | Stop reason: HOSPADM

## 2025-03-09 RX ADMIN — LEVOTHYROXINE SODIUM 100 MCG: 0.05 TABLET ORAL at 05:29

## 2025-03-09 RX ADMIN — METHYLPREDNISOLONE SODIUM SUCCINATE 40 MG: 40 INJECTION INTRAMUSCULAR; INTRAVENOUS at 05:29

## 2025-03-09 RX ADMIN — METHYLPREDNISOLONE SODIUM SUCCINATE 40 MG: 40 INJECTION INTRAMUSCULAR; INTRAVENOUS at 00:17

## 2025-03-09 RX ADMIN — HUMAN INSULIN 5 UNITS: 100 INJECTION, SOLUTION SUBCUTANEOUS at 05:29

## 2025-03-09 RX ADMIN — SODIUM PHOSPHATE, MONOBASIC, MONOHYDRATE AND SODIUM PHOSPHATE, DIBASIC, ANHYDROUS 15 MMOL: 276; 142 INJECTION, SOLUTION INTRAVENOUS at 05:29

## 2025-03-09 RX ADMIN — DIPHENHYDRAMINE HYDROCHLORIDE 50 MG: 50 INJECTION, SOLUTION INTRAMUSCULAR; INTRAVENOUS at 00:16

## 2025-03-09 RX ADMIN — HEPARIN SODIUM 5000 UNITS: 5000 INJECTION INTRAVENOUS; SUBCUTANEOUS at 08:21

## 2025-03-09 RX ADMIN — Medication 10 ML: at 08:22

## 2025-03-09 RX ADMIN — SODIUM CHLORIDE 100 ML/HR: 9 INJECTION, SOLUTION INTRAVENOUS at 05:43

## 2025-03-09 RX ADMIN — FAMOTIDINE 20 MG: 10 INJECTION INTRAVENOUS at 08:21

## 2025-03-09 RX ADMIN — MAGNESIUM SULFATE IN WATER 2 G: 40 INJECTION, SOLUTION INTRAVENOUS at 08:39

## 2025-03-09 NOTE — DISCHARGE SUMMARY
[facility]         HOSPITALIST  DISCHARGE SUMMARY    Patient Name: Velma Andres  : 1975  MRN: [id number]    Date of Admission: 3/8/2025  Date of Discharge:  25  Primary Care Physician: Jignesh Doe    Consultants:  -Pulmonology: Dr. William Lomeli     [facility] Problems:  Anaphylactic reaction to contrast media  Acute hypoxic respiratory failure secondary to above  Chronic pain  Peripheral artery disease  Hypothyroidism  Anxiety  Obesity (BMI: 34)    Hospital Course     Hospital Course:  Velma Andres is a 50 y.o. female with history of leg stent at [facility] for peripheral artery disease about 2 months prior to this presentation that presented with left leg pain for a week along with numbness and tingling and swelling of the leg. Doppler ultrasound was negative. CT angiogram done that showed patent stent and no other abnormalities, patient was premedicated with IV Solu-Medrol for CT scan given contrast allergy, however, he started develop more wheezing and worsening shortness of breath. Patient given epinephrine and DuoNeb as well as IV fluids but the symptoms did not improve. Patient had been restarted on Airvo and also noted to have labile blood pressure. Patient admitted to ICU level of care. Pulmonology consulted. Patient started on epinephrine drip.  Epinephrine drip to be weaned.  Patient received 2 days of steroid therapy during hospitalization.  Patient weaned to room air.  On day of discharge patient hemodynamically stable and no additional inpatient evaluation or workup necessary at this time, patient will discharge home with outpatient follow-up.    DISCHARGE Follow Up Recommendations for labs and diagnostics:   -Follow-up with PCP in 3 to 5 days    Day of Discharge     Vital Signs:  Temp:  [97.5 °F (36.4 °C)-98.8 °F (37.1 °C)] 97.5 °F (36.4 °C)  Heart Rate:  [] 75  Resp:  [16-26] 22  BP: ()/(42-83) 90/71  Flow (L/min) (Oxygen Therapy):   [3-35] 3  Physical Exam:   Gen: Sitting up in bed, conversant  Resp: Normal respiratory effort, good aeration  Card: RRR, No m/r/g  Abd: Soft, Nontender, Nondistended, + bowel sounds     Discharge Details        Discharge Medications        Continue These Medications        Instructions Start Date   ALPRAZolam 2 MG tablet  Commonly known as: XANAX   Take 1 tablet by mouth 3 (Three) Times a Day As Needed.      gabapentin 600 MG tablet  Commonly known as: NEURONTIN   600 mg, Oral, 3 Times Daily      levothyroxine 100 MCG tablet  Commonly known as: SYNTHROID, LEVOTHROID   100 mcg, Every Early Morning               Allergies   Allergen Reactions   • Ct Contrast Anaphylaxis     3/8/25 pre medicated with solumedrol & benadryl, pt reacted, gave 0.3 epi   • Tramadol Other (See Comments), Unknown (See Comments) and Headache     MIGRAINES   • Ibuprofen Nausea And Vomiting and Other (See Comments)     ULCER    Other reaction(s): Nausea and vomiting   ULCER       Discharge Disposition:  Home or Self Care    Diet:  Hospital:  Diet Order   Procedures   • Diet: Cardiac; Healthy Heart (2-3 Na+); Texture: Mechanical Ground (NDD 2); Fluid Consistency: Thin (IDDSI 0)       Discharge Activity:   Activity Instructions       Activity as Tolerated              CODE STATUS:  Code Status and Medical Interventions: CPR (Attempt to Resuscitate); Full Support   Ordered at: 03/08/25 0549     Code Status (Patient has no pulse and is not breathing):    CPR (Attempt to Resuscitate)     Medical Interventions (Patient has pulse or is breathing):    Full Support       Future Appointments   Date Time Provider Department Center   4/1/2025  2:30 PM [facility]       Additional Instructions for the Follow-ups that You Need to Schedule       Discharge Follow-up with PCP   As directed       Currently Documented PCP:    Jignesh Doe    PCP Phone Number:    [phone]     Follow Up Details: Follow-up in 3 to 5 days                 Pertinent  and/or Most Recent Results     RADIOLOGY:  CT Angio Abdominal Aorta Bilateral Iliofem Runoff [[id number]] Terrance as Reviewed   Order Status: Completed Collected: 03/08/25 0439    Updated: 03/08/25 0452   Narrative:     CT ANGIO ABDOMINAL AORTA BILAT ILIOFEM RUNOFF-     Date of exam: 3/8/2025 3:21 AM     Indications: Left leg pain. Status post stenting.     Comparisons: 12/31/2024; 12/15/2024; 12/13/2024; 12/11/2024.     TECHNIQUE:  CTA of the abdomen, pelvis and both lower extremities was performed  after the uneventful intravenous administration of 120 mL of Isovue-370  nonionic iodinated contrast agent. Reconstructed 2-D coronal and  sagittal images were also obtained. In addition, a 3-D volume rendered  image was created for interpretation. Automated exposure control and  iterative reconstruction methods were used. The study is limited by  motion artifact.     FINDINGS:  No hemodynamically significant arterial stenoses are seen. No emergent  large vessel occlusion. No arterial dissection. No endovascular stents  are seen. There is mild mixed calcified and noncalcified atherosclerotic  plaque involving the proximal left common iliac artery. The aortoiliac  arterial system and the bilateral femoral-popliteal arterial systems are  widely patent without hemodynamically significant stenosis. The  infrapopliteal arteries are patent. There is probably at least  two-vessel runoff across the ankles into the feet bilaterally. There are  2 (two) left renal arteries, which is a normal variant. The other  incidental/chronic findings are as described in prior exam reports.         Impression:     No acute arterial finding is appreciated.           Portions of this note were completed with a voice recognition program.     3/8/2025 4:50 AM by Sarwat Bonilla MD on Workstation: [id number]       Duplex Venous Lower Extremity - Left CV-READ [[id number]] Terrance as Reviewed   Order Status: Completed Resulted: 03/08/25 0705     Updated: 03/08/25 0705    Right Common Femoral Spont Y    Right Common Femoral Competent Y    Right Common Femoral Phasic Y    Right Common Femoral Compress C    Right Common Femoral Augment Y    Left Common Femoral Spont Y    Left Common Femoral Competent Y    Left Common Femoral Phasic Y    Left Common Femoral Compress C    Left Common Femoral Augment Y    Left Saphenofemoral Junction Compress C    Left Proximal Femoral Compress C    Left Mid Femoral Spont Y    Left Mid Femoral Competent Y    Left Mid Femoral Phasic Y    Left Mid Femoral Compress C    Left Mid Femoral Augment Y    Left Distal Femoral Compress C    Left Popliteal Spont Y    Left Popliteal Competent Y    Left Popliteal Phasic Y    Left Popliteal Compress C    Left Popliteal Augment Y    Left Posterior Tibial Compress C    Left Peroneal Compress C    Left Gastronemius Compress C    Left Greater Saph AK Compress C    Left Greater Saph BK Compress C    Left Lesser Saph Compress C    BH CV VAS PRELIMINARY FINDINGS SCRIPTING 1.0   Narrative:     •  Normal left lower extremity venous duplex scan.     LAB RESULTS:      Lab 03/09/25 0305 03/08/25 0818 03/08/25 0246 03/07/25 2219 03/02/25  1813   WBC 24.46* 16.68*  --  7.83 8.37   HEMOGLOBIN 12.3 14.9  --  13.3 14.4   HEMATOCRIT 37.4 46.0  --  39.9 42.6   PLATELETS 252 381  --  295 290   NEUTROS ABS  --  13.78*  --  3.64 4.49   IMMATURE GRANS (ABS)  --  0.31*  --  0.03 0.02   LYMPHS ABS  --  2.15  --  3.17* 2.89   MONOS ABS  --  0.28  --  0.56 0.60   EOS ABS  --  0.03  --  0.36 0.29   MCV 91.7 90.2  --  87.3 86.9   LACTATE  --   --  0.9  --   --    PROTIME  --   --   --   --  13.9   APTT  --   --   --   --  28.3         Lab 03/09/25 0305 03/08/25 0818 03/07/25 2219 03/02/25  1813   SODIUM 140 138 141 139   POTASSIUM 5.2 3.1* 4.1 3.7   CHLORIDE 114* 107 106 103   CO2 16.0* 16.7* 24.2 24.5   ANION GAP 10.0 14.3 10.8 11.5   BUN 13 14 12 11   CREATININE 1.09* 1.36* 1.07* 1.07*   EGFR 62.8 48.1* 64.2 64.2    GLUCOSE 264* 272* 125* 129*   CALCIUM 8.0* 7.7* 9.1 9.5   MAGNESIUM 1.8 2.0  --   --    PHOSPHORUS 1.5* 2.2*  --   --          Lab 03/09/25  0305 03/07/25  2219 03/02/25  1813   TOTAL PROTEIN 6.2 7.1 7.9   ALBUMIN 3.3* 3.6 4.0   GLOBULIN 2.9 3.5 3.9   ALT (SGPT) 21 18 20   AST (SGOT) 22 20 19   BILIRUBIN <0.2 <0.2 0.3   ALK PHOS 71 90 101         Lab 03/08/25  1100 03/08/25  0818 03/02/25  1813   HSTROP T 6 <6  --    PROTIME  --   --  13.9   INR  --   --  1.03             Lab 03/02/25  1813   ABO TYPING A   RH TYPING Positive   ANTIBODY SCREEN Negative         Lab 03/08/25  0458   PH, ARTERIAL 7.275*   PCO2, ARTERIAL 41.9   PO2 ART 86.1   O2 SATURATION ART 95.1   FIO2 40   HCO3 ART 19.4*   BASE EXCESS ART -7.1*     Brief Urine Lab Results  (Last result in the past 365 days)        Color   Clarity   Blood   Leuk Est   Nitrite   Protein   CREAT   Urine HCG        01/01/25 0147 Yellow   Clear   Moderate (2+)   Negative   Negative   Negative                 Microbiology Results (last 10 days)       ** No results found for the last 240 hours. **              Results for orders placed during the hospital encounter of 03/08/25    Duplex Venous Lower Extremity - Left CV-READ    Interpretation Summary  •  Normal left lower extremity venous duplex scan.      Results for orders placed during the hospital encounter of 03/08/25    Duplex Venous Lower Extremity - Left CV-READ    Interpretation Summary  •  Normal left lower extremity venous duplex scan.          Time spent on Discharge including face to face service:  31 minutes    Electronically signed by Rodriguez Gray MD, 03/09/25, 10:14 AM EDT.

## 2025-03-09 NOTE — PLAN OF CARE
Problem: Adult Inpatient Plan of Care  Goal: Plan of Care Review  Outcome: Progressing  Goal: Patient-Specific Goal (Individualized)  Outcome: Progressing  Goal: Absence of Hospital-Acquired Illness or Injury  Outcome: Progressing  Intervention: Identify and Manage Fall Risk  Recent Flowsheet Documentation  Taken 3/9/2025 0000 by Josee Lee RN  Safety Promotion/Fall Prevention: safety round/check completed  Taken 3/8/2025 2300 by Josee Lee RN  Safety Promotion/Fall Prevention: safety round/check completed  Taken 3/8/2025 2200 by Josee Lee RN  Safety Promotion/Fall Prevention: safety round/check completed  Taken 3/8/2025 2100 by Josee Lee RN  Safety Promotion/Fall Prevention: safety round/check completed  Taken 3/8/2025 2000 by Josee Lee RN  Safety Promotion/Fall Prevention: safety round/check completed  Intervention: Prevent Skin Injury  Recent Flowsheet Documentation  Taken 3/9/2025 0300 by Josee Lee RN  Body Position: position changed independently  Taken 3/9/2025 0100 by Josee Lee RN  Body Position: position changed independently  Taken 3/9/2025 0000 by Josee Lee RN  Body Position: position changed independently  Taken 3/8/2025 2300 by Josee Lee RN  Body Position: position changed independently  Taken 3/8/2025 2200 by Josee Lee RN  Body Position: position changed independently  Taken 3/8/2025 2100 by Josee Lee RN  Body Position: position changed independently  Taken 3/8/2025 2000 by Josee Lee RN  Body Position:   position changed independently   side-lying  Goal: Optimal Comfort and Wellbeing  Outcome: Progressing  Intervention: Monitor Pain and Promote Comfort  Recent Flowsheet Documentation  Taken 3/9/2025 0000 by Josee Lee RN  Pain Management Interventions: position adjusted  Taken 3/8/2025 2000 by Josee Lee RN  Pain Management Interventions: position adjusted  Goal: Readiness for Transition of Care  Outcome:  Progressing  Goal: Plan of Care Review  Outcome: Progressing  Goal: Patient-Specific Goal (Individualized)  Outcome: Progressing  Goal: Absence of Hospital-Acquired Illness or Injury  Outcome: Progressing  Intervention: Identify and Manage Fall Risk  Recent Flowsheet Documentation  Taken 3/9/2025 0000 by Josee Lee RN  Safety Promotion/Fall Prevention: safety round/check completed  Taken 3/8/2025 2300 by Josee Lee RN  Safety Promotion/Fall Prevention: safety round/check completed  Taken 3/8/2025 2200 by Josee Lee RN  Safety Promotion/Fall Prevention: safety round/check completed  Taken 3/8/2025 2100 by Josee Lee RN  Safety Promotion/Fall Prevention: safety round/check completed  Taken 3/8/2025 2000 by Josee Lee RN  Safety Promotion/Fall Prevention: safety round/check completed  Intervention: Prevent Skin Injury  Recent Flowsheet Documentation  Taken 3/9/2025 0300 by Josee Lee RN  Body Position: position changed independently  Taken 3/9/2025 0100 by Josee Lee RN  Body Position: position changed independently  Taken 3/9/2025 0000 by Josee Lee RN  Body Position: position changed independently  Taken 3/8/2025 2300 by Josee Lee RN  Body Position: position changed independently  Taken 3/8/2025 2200 by Josee Lee RN  Body Position: position changed independently  Taken 3/8/2025 2100 by Josee Lee RN  Body Position: position changed independently  Taken 3/8/2025 2000 by Josee Lee RN  Body Position:   position changed independently   side-lying  Goal: Optimal Comfort and Wellbeing  Outcome: Progressing  Intervention: Monitor Pain and Promote Comfort  Recent Flowsheet Documentation  Taken 3/9/2025 0000 by Josee Lee RN  Pain Management Interventions: position adjusted  Taken 3/8/2025 2000 by Josee Lee RN  Pain Management Interventions: position adjusted  Goal: Readiness for Transition of Care  Outcome: Progressing     Problem:  Allergic/Anaphylactic Reaction  Goal: Resolution of Hypersensitivity Symptoms  Outcome: Progressing  Intervention: Manage Acute Allergic Reaction  Recent Flowsheet Documentation  Taken 3/9/2025 0000 by Josee Lee RN  Medication Review/Management: medications reviewed  Taken 3/8/2025 2300 by Josee Lee RN  Medication Review/Management: medications reviewed  Taken 3/8/2025 2200 by Josee Lee RN  Medication Review/Management: medications reviewed  Taken 3/8/2025 2100 by Josee Lee RN  Medication Review/Management: medications reviewed  Taken 3/8/2025 2000 by Josee Lee RN  Medication Review/Management: medications reviewed     Problem: Skin Injury Risk Increased  Goal: Skin Health and Integrity  Outcome: Progressing  Intervention: Optimize Skin Protection  Recent Flowsheet Documentation  Taken 3/9/2025 0300 by Josee Lee RN  Activity Management: up to bedside commode  Head of Bed (HOB) Positioning: HOB at 20-30 degrees  Taken 3/9/2025 0100 by Josee Lee RN  Activity Management: activity encouraged  Head of Bed (HOB) Positioning: HOB at 20-30 degrees  Taken 3/9/2025 0000 by Josee Lee RN  Activity Management: activity encouraged  Head of Bed (HOB) Positioning: HOB at 20-30 degrees  Taken 3/8/2025 2300 by Josee Lee RN  Activity Management: activity encouraged  Head of Bed (HOB) Positioning: HOB at 20-30 degrees  Taken 3/8/2025 2200 by Josee Lee RN  Activity Management: activity encouraged  Head of Bed (HOB) Positioning: HOB at 20-30 degrees  Taken 3/8/2025 2100 by Josee Lee RN  Activity Management: activity encouraged  Head of Bed (HOB) Positioning: HOB at 20-30 degrees  Taken 3/8/2025 2000 by Josee Lee RN  Activity Management: activity encouraged  Head of Bed (HOB) Positioning: HOB at 20-30 degrees  Goal: Skin Health and Integrity  Outcome: Progressing  Intervention: Optimize Skin Protection  Recent Flowsheet Documentation  Taken 3/9/2025 0300 by Rosa  MARIBEL Tripp  Activity Management: up to bedside commode  Head of Bed (HOB) Positioning: HOB at 20-30 degrees  Taken 3/9/2025 0100 by Josee Lee RN  Activity Management: activity encouraged  Head of Bed (HOB) Positioning: HOB at 20-30 degrees  Taken 3/9/2025 0000 by Josee Lee RN  Activity Management: activity encouraged  Head of Bed (HOB) Positioning: HOB at 20-30 degrees  Taken 3/8/2025 2300 by Josee Lee RN  Activity Management: activity encouraged  Head of Bed (HOB) Positioning: HOB at 20-30 degrees  Taken 3/8/2025 2200 by Josee Lee, RN  Activity Management: activity encouraged  Head of Bed (HOB) Positioning: HOB at 20-30 degrees  Taken 3/8/2025 2100 by Josee Lee, RN  Activity Management: activity encouraged  Head of Bed (HOB) Positioning: HOB at 20-30 degrees  Taken 3/8/2025 2000 by Josee Lee, RN  Activity Management: activity encouraged  Head of Bed (HOB) Positioning: HOB at 20-30 degrees   Goal Outcome Evaluation:

## 2025-03-09 NOTE — PROGRESS NOTES
[facility]     Progress Note    Patient Name: Velma Andres  : 1975  MRN: [id number]  Primary Care Physician:  Jignesh Doe  Date of admission: 3/8/2025    Subjective   Subjective       Chief Complaint:   Patient is critically ill in ICU with Anaphylactic reaction to IV contrast.     Critical drips: None  Lines and tubes: peripheral IV    Over last 24 hours,   Required Epinephrine  High flow O2  Unable to tolerate PO intake without choking in early am  Continued on steroids  Continued on pepcid    Overnight, no acute events.   Weaned off Epi  Transitioned to nasal cannula    This morning,  Sitting up in bed  Currently on room air  Talking with her daughter at bedside  Would like to go home today        Objective   Objective     Vitals:   Temp:  [97.7 °F (36.5 °C)-98.8 °F (37.1 °C)] 97.7 °F (36.5 °C)  Heart Rate:  [] 75  Resp:  [16-26] 22  BP: ()/(42-83) 90/71  Flow (L/min) (Oxygen Therapy):  [3-40] 3  Physical Exam   Vital Signs Reviewed  WDWN, Alert, NAD  HEENT:  PERRL, EOMI.  OP, MMM  Neck:  Supple, No JVD, no thyromegaly  CV: RRR, no MGR, pulses 2+, equal  Abd:  Soft, NT, ND, + BS, no HSM  EXT:  no clubbing, no cyanosis, No BLE edema  Neuro:  A&Ox3, CN grossly intact, no focal deficits  Skin: No rashes or lesions noted      Result Review    Result Review:  I have personally reviewed the results from the time of this admission to 3/9/2025 07:30 EDT and agree with these findings:  []  Laboratory  []  Microbiology  []  Radiology  []  EKG/Telemetry   []  Cardiology/Vascular   []  Pathology  []  Old records  []  Other:  Most notable findings include:       Lab 25  0305 25  0818 25  2219 25  1813   WBC 24.46* 16.68* 7.83 8.37   HEMOGLOBIN 12.3 14.9 13.3 14.4   HEMATOCRIT 37.4 46.0 39.9 42.6   PLATELETS 252 381 295 290   SODIUM 140 138 141 139   POTASSIUM 5.2 3.1* 4.1 3.7   CHLORIDE 114* 107 106 103   CO2 16.0* 16.7* 24.2 24.5   BUN 13 14 12 11   CREATININE 1.09*  1.36* 1.07* 1.07*   GLUCOSE 264* 272* 125* 129*   CALCIUM 8.0* 7.7* 9.1 9.5   PHOSPHORUS 1.5* 2.2*  --   --    TOTAL PROTEIN 6.2  --  7.1 7.9   ALBUMIN 3.3*  --  3.6 4.0   GLOBULIN 2.9  --  3.5 3.9         Assessment & Plan   Assessment / Plan     Brief Patient Summary:  Velma Andres is a 50 y.o. female   Anaphylaxis  Acute hypoxic resp failure      Active Hospital Problems:  Active Hospital Problems    Diagnosis     **Contrast media allergy     Chronic pain     Peripheral artery disease     Hypoxic respiratory failure     Anaphylactic reaction      Plan:   Patient has been weaned off O2.  Currenlty SPO2 95% or greater  Currently on Solumedrol.  Likely will not require steroid taper at discharge  No longer requiring Epi gtt  On scheduled Pepcid  Continue scheduled nebulizers  Trend renal function electrolytes.  Replace as needed  DVT prophylaxis with heparin     VTE Prophylaxis:  Pharmacologic & mechanical VTE prophylaxis orders are present.        CODE STATUS:   Code Status (Patient has no pulse and is not breathing): CPR (Attempt to Resuscitate)  Medical Interventions (Patient has pulse or is breathing): Full Support      I have reviewed labs, imaging, pertinent clinical data and provider notes.   I have discussed with bedside nurse and primary service.       Electronically signed by William Lomeli MD, 3/9/2025, 07:30 EDT.

## 2025-03-10 NOTE — OUTREACH NOTE
Prep Survey      Flowsheet Row Responses   [facility] facility patient discharged from? [facility]   Is LACE score < 7 ? No   Eligibility Readm Mgmt   Discharge diagnosis Anaphylactic reaction to contrast media   Does the patient have one of the following disease processes/diagnoses(primary or secondary)? Other   Does the patient have Home health ordered? No   Is there a DME ordered? No   Prep survey completed? Yes            Abigail NUNEZ - Registered Nurse

## 2025-03-13 ENCOUNTER — READMISSION MANAGEMENT (OUTPATIENT)
Dept: CALL CENTER | Facility: HOSPITAL | Age: 50
End: 2025-03-13
Payer: MEDICARE

## 2025-03-13 NOTE — OUTREACH NOTE
Medical Week 1 Survey      Flowsheet Row Responses   [facility] patient discharged from? [facility]   Does the patient have one of the following disease processes/diagnoses(primary or secondary)? Other   Week 1 attempt successful? Yes   Call start time 1530   Call end time 1534   Discharge diagnosis Anaphylactic reaction to contrast media   Person spoke with today (if not patient) and relationship Patient   Meds reviewed with patient/caregiver? Yes   Medication comments abx, nebulizer, and steriod provided from her pcp- After DC from hospital she was wheezing at fu appt and had alot of swelling.   Does the patient have a primary care provider?  Yes   Comments regarding PCP Seen pcp since DC   Has home health visited the patient within 72 hours of discharge? N/A   Psychosocial issues? No   Did the patient receive a copy of their discharge instructions? Yes   Nursing interventions Reviewed instructions with patient   What is the patient's perception of their health status since discharge? New symptoms unrelated to diagnosis  [CP, HA.]   Week 1 call completed? Yes   Wrap up additional comments Patient reports not doing well. She has really bad CP, HA, and L side Rib pain for further fu Rule out any Heart concerns.   Call end time 1534            Ml BERNSTEIN - Registered Nurse

## 2025-03-19 LAB
QT INTERVAL: 385 MS
QT INTERVAL: 401 MS
QTC INTERVAL: 504 MS
QTC INTERVAL: 513 MS

## 2025-03-21 ENCOUNTER — TRANSCRIBE ORDERS (OUTPATIENT)
Dept: ADMINISTRATIVE | Facility: HOSPITAL | Age: 50
End: 2025-03-21
Payer: MEDICARE

## 2025-03-21 DIAGNOSIS — M79.605 LEFT LEG PAIN: Primary | ICD-10-CM

## 2025-04-27 ENCOUNTER — APPOINTMENT (OUTPATIENT)
Dept: GENERAL RADIOLOGY | Facility: HOSPITAL | Age: 50
End: 2025-04-27
Payer: MEDICARE

## 2025-04-27 ENCOUNTER — HOSPITAL ENCOUNTER (EMERGENCY)
Facility: HOSPITAL | Age: 50
Discharge: HOME | End: 2025-04-27
Attending: EMERGENCY MEDICINE | Admitting: EMERGENCY MEDICINE
Payer: MEDICARE

## 2025-04-27 VITALS
OXYGEN SATURATION: 93 % | HEART RATE: 78 BPM | DIASTOLIC BLOOD PRESSURE: 60 MMHG | BODY MASS INDEX: 33.96 KG/M2 | HEIGHT: 61 IN | WEIGHT: 179.9 LBS | RESPIRATION RATE: 17 BRPM | TEMPERATURE: 98.5 F | SYSTOLIC BLOOD PRESSURE: 114 MMHG

## 2025-04-27 DIAGNOSIS — J18.9 PNEUMONIA OF LEFT LOWER LOBE DUE TO INFECTIOUS ORGANISM: Primary | ICD-10-CM

## 2025-04-27 LAB
ALBUMIN SERPL-MCNC: 3.9 G/DL (ref 3.5–5.2)
ALBUMIN/GLOB SERPL: 1.1 G/DL
ALP SERPL-CCNC: 86 U/L (ref 39–117)
ALT SERPL W P-5'-P-CCNC: 33 U/L (ref 1–33)
ANION GAP SERPL CALCULATED.3IONS-SCNC: 13.3 MMOL/L (ref 5–15)
AST SERPL-CCNC: 25 U/L (ref 1–32)
BASOPHILS # BLD AUTO: 0.04 10*3/MM3 (ref 0–0.2)
BASOPHILS NFR BLD AUTO: 0.6 % (ref 0–1.5)
BILIRUB SERPL-MCNC: 0.2 MG/DL (ref 0–1.2)
BUN SERPL-MCNC: 16 MG/DL (ref 6–20)
BUN/CREAT SERPL: 18.4 (ref 7–25)
CALCIUM SPEC-SCNC: 9.1 MG/DL (ref 8.6–10.5)
CHLORIDE SERPL-SCNC: 104 MMOL/L (ref 98–107)
CO2 SERPL-SCNC: 23.7 MMOL/L (ref 22–29)
CREAT SERPL-MCNC: 0.87 MG/DL (ref 0.57–1)
DEPRECATED RDW RBC AUTO: 42.3 FL (ref 37–54)
EGFRCR SERPLBLD CKD-EPI 2021: 82.3 ML/MIN/1.73
EOSINOPHIL # BLD AUTO: 0.02 10*3/MM3 (ref 0–0.4)
EOSINOPHIL NFR BLD AUTO: 0.3 % (ref 0.3–6.2)
ERYTHROCYTE [DISTWIDTH] IN BLOOD BY AUTOMATED COUNT: 13 % (ref 12.3–15.4)
FLUAV RNA RESP QL NAA+PROBE: NOT DETECTED
FLUBV RNA RESP QL NAA+PROBE: NOT DETECTED
GLOBULIN UR ELPH-MCNC: 3.5 GM/DL
GLUCOSE SERPL-MCNC: 112 MG/DL (ref 65–99)
HCT VFR BLD AUTO: 43 % (ref 34–46.6)
HGB BLD-MCNC: 14.4 G/DL (ref 12–15.9)
HOLD SPECIMEN: NORMAL
HOLD SPECIMEN: NORMAL
IMM GRANULOCYTES # BLD AUTO: 0.02 10*3/MM3 (ref 0–0.05)
IMM GRANULOCYTES NFR BLD AUTO: 0.3 % (ref 0–0.5)
LYMPHOCYTES # BLD AUTO: 1.7 10*3/MM3 (ref 0.7–3.1)
LYMPHOCYTES NFR BLD AUTO: 24 % (ref 19.6–45.3)
MCH RBC QN AUTO: 29.8 PG (ref 26.6–33)
MCHC RBC AUTO-ENTMCNC: 33.5 G/DL (ref 31.5–35.7)
MCV RBC AUTO: 89 FL (ref 79–97)
MONOCYTES # BLD AUTO: 0.65 10*3/MM3 (ref 0.1–0.9)
MONOCYTES NFR BLD AUTO: 9.2 % (ref 5–12)
NEUTROPHILS NFR BLD AUTO: 4.64 10*3/MM3 (ref 1.7–7)
NEUTROPHILS NFR BLD AUTO: 65.6 % (ref 42.7–76)
NRBC BLD AUTO-RTO: 0 /100 WBC (ref 0–0.2)
NT-PROBNP SERPL-MCNC: 277.6 PG/ML (ref 0–450)
PLATELET # BLD AUTO: 269 10*3/MM3 (ref 140–450)
PMV BLD AUTO: 9.5 FL (ref 6–12)
POTASSIUM SERPL-SCNC: 4.2 MMOL/L (ref 3.5–5.2)
PROT SERPL-MCNC: 7.4 G/DL (ref 6–8.5)
RBC # BLD AUTO: 4.83 10*6/MM3 (ref 3.77–5.28)
RSV RNA RESP QL NAA+PROBE: NOT DETECTED
SARS-COV-2 RNA RESP QL NAA+PROBE: NOT DETECTED
SODIUM SERPL-SCNC: 141 MMOL/L (ref 136–145)
TROPONIN T SERPL HS-MCNC: <6 NG/L
WBC NRBC COR # BLD AUTO: 7.07 10*3/MM3 (ref 3.4–10.8)
WHOLE BLOOD HOLD COAG: NORMAL
WHOLE BLOOD HOLD SPECIMEN: NORMAL

## 2025-04-27 PROCEDURE — 80053 COMPREHEN METABOLIC PANEL: CPT | Performed by: EMERGENCY MEDICINE

## 2025-04-27 PROCEDURE — 96375 TX/PRO/DX INJ NEW DRUG ADDON: CPT

## 2025-04-27 PROCEDURE — 85025 COMPLETE CBC W/AUTO DIFF WBC: CPT | Performed by: EMERGENCY MEDICINE

## 2025-04-27 PROCEDURE — 96374 THER/PROPH/DIAG INJ IV PUSH: CPT

## 2025-04-27 PROCEDURE — 36415 COLL VENOUS BLD VENIPUNCTURE: CPT

## 2025-04-27 PROCEDURE — 83880 ASSAY OF NATRIURETIC PEPTIDE: CPT | Performed by: EMERGENCY MEDICINE

## 2025-04-27 PROCEDURE — 94799 UNLISTED PULMONARY SVC/PX: CPT

## 2025-04-27 PROCEDURE — 93005 ELECTROCARDIOGRAM TRACING: CPT | Performed by: EMERGENCY MEDICINE

## 2025-04-27 PROCEDURE — 94664 DEMO&/EVAL PT USE INHALER: CPT

## 2025-04-27 PROCEDURE — 84484 ASSAY OF TROPONIN QUANT: CPT | Performed by: EMERGENCY MEDICINE

## 2025-04-27 PROCEDURE — 25010000002 CEFTRIAXONE PER 250 MG

## 2025-04-27 PROCEDURE — 99284 EMERGENCY DEPT VISIT MOD MDM: CPT

## 2025-04-27 PROCEDURE — 71045 X-RAY EXAM CHEST 1 VIEW: CPT

## 2025-04-27 PROCEDURE — 25010000002 METHYLPREDNISOLONE PER 125 MG

## 2025-04-27 PROCEDURE — 87637 SARSCOV2&INF A&B&RSV AMP PRB: CPT

## 2025-04-27 PROCEDURE — 94640 AIRWAY INHALATION TREATMENT: CPT

## 2025-04-27 RX ORDER — CLONIDINE HYDROCHLORIDE 0.1 MG/1
1 TABLET ORAL EVERY 12 HOURS SCHEDULED
COMMUNITY
Start: 2025-03-10

## 2025-04-27 RX ORDER — DOXYCYCLINE 100 MG/1
100 CAPSULE ORAL 2 TIMES DAILY
Qty: 10 CAPSULE | Refills: 0 | Status: SHIPPED | OUTPATIENT
Start: 2025-04-27 | End: 2025-05-02

## 2025-04-27 RX ORDER — ALBUTEROL SULFATE 90 UG/1
INHALANT RESPIRATORY (INHALATION)
COMMUNITY
Start: 2025-03-10

## 2025-04-27 RX ORDER — IPRATROPIUM BROMIDE AND ALBUTEROL SULFATE 2.5; .5 MG/3ML; MG/3ML
3 SOLUTION RESPIRATORY (INHALATION) ONCE
Status: COMPLETED | OUTPATIENT
Start: 2025-04-27 | End: 2025-04-27

## 2025-04-27 RX ORDER — METHYLPREDNISOLONE 4 MG/1
TABLET ORAL
Qty: 21 TABLET | Refills: 0 | Status: SHIPPED | OUTPATIENT
Start: 2025-04-27 | End: 2025-05-03

## 2025-04-27 RX ORDER — SODIUM CHLORIDE 0.9 % (FLUSH) 0.9 %
10 SYRINGE (ML) INJECTION AS NEEDED
Status: DISCONTINUED | OUTPATIENT
Start: 2025-04-27 | End: 2025-04-27 | Stop reason: HOSPADM

## 2025-04-27 RX ORDER — ALBUTEROL SULFATE 90 UG/1
2 INHALANT RESPIRATORY (INHALATION) EVERY 4 HOURS PRN
Qty: 18 G | Refills: 0 | Status: SHIPPED | OUTPATIENT
Start: 2025-04-27

## 2025-04-27 RX ORDER — HYDROCODONE BITARTRATE AND ACETAMINOPHEN 7.5; 325 MG/1; MG/1
1 TABLET ORAL EVERY 6 HOURS
COMMUNITY
Start: 2025-04-15

## 2025-04-27 RX ORDER — AZITHROMYCIN 250 MG/1
TABLET, FILM COATED ORAL
Qty: 6 TABLET | Refills: 0 | Status: SHIPPED | OUTPATIENT
Start: 2025-04-27 | End: 2025-05-02

## 2025-04-27 RX ORDER — HYDROXYZINE HYDROCHLORIDE 50 MG/1
50 TABLET, FILM COATED ORAL 2 TIMES DAILY PRN
COMMUNITY
Start: 2025-04-02

## 2025-04-27 RX ORDER — BUSPIRONE HYDROCHLORIDE 10 MG/1
TABLET ORAL
COMMUNITY
Start: 2025-04-23

## 2025-04-27 RX ORDER — PREDNISONE 10 MG/1
TABLET ORAL
COMMUNITY
Start: 2025-03-10 | End: 2025-04-27

## 2025-04-27 RX ORDER — FLUOXETINE HYDROCHLORIDE 40 MG/1
1 CAPSULE ORAL DAILY
COMMUNITY
Start: 2025-04-02

## 2025-04-27 RX ADMIN — WATER 125 MG: 1 INJECTION INTRAMUSCULAR; INTRAVENOUS; SUBCUTANEOUS at 18:02

## 2025-04-27 RX ADMIN — IPRATROPIUM BROMIDE AND ALBUTEROL SULFATE 3 ML: .5; 3 SOLUTION RESPIRATORY (INHALATION) at 18:18

## 2025-04-27 RX ADMIN — CEFTRIAXONE SODIUM 2000 MG: 2 INJECTION, POWDER, FOR SOLUTION INTRAMUSCULAR; INTRAVENOUS at 18:03

## 2025-04-27 NOTE — DISCHARGE INSTRUCTIONS
COVID and flu swab is negative.  Lab work does not show any abnormalities.  React workup is negative.  Your chest x-ray does show that you have left lower lobe pneumonia.  You have been given a dose of IV antibiotics during your ED visit.  I have sent albuterol inhaler, steroid pack and 2 antibiotics to the pharmacy.  Please take as prescribed until finished.  Follow-up with your PCP

## 2025-04-27 NOTE — ED NOTES
Attempted to do air strips multiple times, data was not showing on epic, data was visible on screen and monitors at nurses stations not on narrator.

## 2025-04-27 NOTE — ED PROVIDER NOTES
Time: 3:07 PM EDT  Date of encounter:  4/27/2025  Independent Historian/Clinical History and Information was obtained by:   Patient    History is limited by: N/A    Chief Complaint   Patient presents with    Shortness of Breath    Cough         History of Present Illness:  Patient is a 50 y.o. year old female who presents to the emergency department for evaluation of Dyspnea. Patient complains of dyspnea x 3 days with cough, congestion, wheezing. Has been taking leftover steroids with intermittent improvement of symptoms. Denies fever. (Provider in triage, Joselito Dias PA-C)    Patient states she drove to [place] the beginning of this month.  No history of DVT or PE.  Patient states she is having a productive cough of clear to yellow sputum.  Patient also complains of watery eyes, chest tightness, wheezing, burning throat, headache. Patient took prednisone at home with little relief.  Patient admits to her symptoms improving with walking and movement.  Patient also states she has coughing induced incontinence for the past 3 days, and notices increased urgency with incontinence without coughing.  Patient is a smoker and has a history of an umbilical hernia repair approximately 2 months ago, anxiety, and an allergy to contrast solution.  She denies sick contacts, abdominal pain, back pain.  Patient denies history of COPD or asthma, does not wear oxygen at home.    Patient Care Team  Primary Care Provider: Jignesh Doe    Past Medical History:     Allergies   Allergen Reactions    Ct Contrast Anaphylaxis     3/8/25 pre medicated with solumedrol & benadryl, pt reacted, gave 0.3 epi    Tramadol Other (See Comments), Unknown (See Comments) and Headache     MIGRAINES    Ibuprofen Nausea And Vomiting and Other (See Comments)     ULCER    Other reaction(s): Nausea and vomiting   ULCER     Past Medical History:   Diagnosis Date    Anxiety     Cervix cancer     TREATED NO REOCCURANCE    Hernia of abdominal wall     Kidney  "stone     Thyroid condition     Umbilical hernia      Past Surgical History:   Procedure Laterality Date    KIDNEY STONE SURGERY      LEG SURGERY      vein repair    VENTRAL HERNIA REPAIR N/A 12/19/2024    Procedure: Robotic umbilical hernia repair-repair hernia at umbilicus with small incisions, camera and robotic instruments;  Surgeon: Devin Abdi MD;  Location: [facility] OR [facility];  Service: Robotics - Ramiro;  Laterality: N/A;     Family History   Problem Relation Age of Onset    Malig Hyperthermia Neg Hx        Home Medications:  Prior to Admission medications    Medication Sig Start Date End Date Taking? Authorizing Provider   ALPRAZolam (XANAX) 2 MG tablet Take 1 tablet by mouth 3 (Three) Times a Day As Needed. 10/11/24   ProviderJulissa MD   gabapentin (NEURONTIN) 600 MG tablet Take 1 tablet by mouth 3 (Three) Times a Day.    ProviderJulissa MD   levothyroxine (SYNTHROID, LEVOTHROID) 100 MCG tablet Take 1 tablet by mouth Every Morning.    ProviderJulissa MD        Social History:   Social History     Tobacco Use    Smoking status: Every Day     Current packs/day: 1.00     Types: Cigarettes    Smokeless tobacco: Never    Tobacco comments:     INST PER ANESTHESIA PROTOCOL   Vaping Use    Vaping status: Never Used   Substance Use Topics    Alcohol use: Not Currently    Drug use: Never         Review of Systems:  Review of Systems   Constitutional:  Negative for chills and fever.   Respiratory:  Positive for cough, chest tightness and shortness of breath.         Physical Exam:  /87   Pulse 92   Temp 98.1 °F (36.7 °C)   Resp 17   Ht 154.9 cm (61\")   Wt 81.6 kg (179 lb 14.3 oz)   SpO2 95%   BMI 33.99 kg/m²         Physical Exam  Vitals and nursing note reviewed.   Constitutional:       Appearance: Normal appearance. She is obese.   HENT:      Head: Normocephalic and atraumatic.      Nose: Nose normal.      Mouth/Throat:      Mouth: Mucous membranes are moist.   Eyes:      " Extraocular Movements: Extraocular movements intact.      Conjunctiva/sclera: Conjunctivae normal.      Pupils: Pupils are equal, round, and reactive to light.   Cardiovascular:      Rate and Rhythm: Normal rate and regular rhythm.      Heart sounds: Normal heart sounds.   Pulmonary:      Effort: Pulmonary effort is normal. No tachypnea, accessory muscle usage or respiratory distress.      Breath sounds: Normal breath sounds. No decreased breath sounds, wheezing, rhonchi or rales.   Abdominal:      General: Abdomen is flat.      Palpations: Abdomen is soft.   Musculoskeletal:         General: Normal range of motion.      Cervical back: Normal range of motion and neck supple.   Skin:     General: Skin is warm and dry.   Neurological:      General: No focal deficit present.      Mental Status: She is alert and oriented to person, place, and time.   Psychiatric:         Mood and Affect: Mood normal.         Behavior: Behavior normal.                    Medical Decision Making:      Comorbidities that affect care:    Anxiety    External Notes reviewed:    Previous Clinic Note: This visit with vascular 3/20/2025 for pain in the left leg and Previous ED Note: [facility] ED visit 3/2/2025      The following orders were placed and all results were independently analyzed by me:  Orders Placed This Encounter   Procedures    COVID-19, FLU A/B, RSV PCR 1 HR TAT - Swab, Nasopharynx    XR Chest 1 View    [place] Draw    Comprehensive Metabolic Panel    BNP    High Sensitivity Troponin T    CBC Auto Differential    NPO Diet NPO Type: Strict NPO    Undress & Gown    Continuous Pulse Oximetry    Vital Signs    Oxygen Therapy- Nasal Cannula; Titrate 1-6 LPM Per SpO2; 90 - 95%    ECG 12 Lead ED Triage Standing Order; SOA    Insert Peripheral IV    CBC & Differential    Green Top (Gel)    Lavender Top    Gold Top - SST    Light Blue Top       Medications Given in the Emergency Department:  Medications   sodium chloride 0.9 % flush 10 mL (has  no administration in time range)   ipratropium-albuterol (DUO-NEB) nebulizer solution 3 mL (3 mL Nebulization Given 4/27/25 1818)   methylPREDNISolone sodium succinate (SOLU-Medrol) 125 mg in sterile water (preservative free) 2 mL (125 mg Intravenous Given 4/27/25 1802)   cefTRIAXone (ROCEPHIN) in NS 2 GRAMS/20ml IV PUSH syringe (2,000 mg Intravenous Given 4/27/25 1803)        ED Course:    The patient was initially evaluated in the triage area where orders were placed. The patient was later dispositioned by Reynold Álvarez PA-C.      The patient was advised to stay for completion of workup which includes but is not limited to communication of labs and radiological results, reassessment and plan. The patient was advised that leaving prior to disposition by a provider could result in critical findings that are not communicated to the patient.     ED Course as of 04/27/25 1912   Sun Apr 27, 2025   1507 PROVIDER IN TRIAGE  Patient was evaluated by me in triage, Joselito iDas PA-C.  Orders were placed and patient is currently awaiting final results and disposition.  [MD]      ED Course User Index  [MD] Joselito Dias PA-C       Labs:    Lab Results (last 24 hours)       Procedure Component Value Units Date/Time    CBC & Differential [[id number]]  (Normal) Collected: 04/27/25 1628    Specimen: Blood Updated: 04/27/25 1634    Narrative:      The following orders were created for panel order CBC & Differential.  Procedure                               Abnormality         Status                     ---------                               -----------         ------                     CBC Auto Differential[[id number]]        Normal              Final result                 Please view results for these tests on the individual orders.    Comprehensive Metabolic Panel [[id number]]  (Abnormal) Collected: 04/27/25 1628    Specimen: Blood Updated: 04/27/25 1705     Glucose 112 mg/dL      BUN 16 mg/dL      Creatinine 0.87  mg/dL      Sodium 141 mmol/L      Potassium 4.2 mmol/L      Comment: Slight hemolysis detected by analyzer. Result may be falsely elevated.        Chloride 104 mmol/L      CO2 23.7 mmol/L      Calcium 9.1 mg/dL      Total Protein 7.4 g/dL      Albumin 3.9 g/dL      ALT (SGPT) 33 U/L      AST (SGOT) 25 U/L      Comment: Slight hemolysis detected by analyzer. Result may be falsely elevated.        Alkaline Phosphatase 86 U/L      Total Bilirubin 0.2 mg/dL      Globulin 3.5 gm/dL      A/G Ratio 1.1 g/dL      BUN/Creatinine Ratio 18.4     Anion Gap 13.3 mmol/L      eGFR 82.3 mL/min/1.73     Narrative:      GFR Categories in Chronic Kidney Disease (CKD)      GFR Category          GFR (mL/min/1.73)    Interpretation  G1                     90 or greater         Normal or high (1)  G2                      60-89                Mild decrease (1)  G3a                   45-59                Mild to moderate decrease  G3b                   30-44                Moderate to severe decrease  G4                    15-29                Severe decrease  G5                    14 or less           Kidney failure          (1)In the absence of evidence of kidney disease, neither GFR category G1 or G2 fulfill the criteria for CKD.    eGFR calculation 2021 CKD-EPI creatinine equation, which does not include race as a factor    BNP [[id number]]  (Normal) Collected: 04/27/25 1628    Specimen: Blood Updated: 04/27/25 1652     proBNP 277.6 pg/mL     Narrative:      This assay is used as an aid in the diagnosis of individuals suspected of having heart failure. It can be used as an aid in the diagnosis of acute decompensated heart failure (ADHF) in patients presenting with signs and symptoms of ADHF to the emergency department (ED). In addition, NT-proBNP of <300 pg/mL indicates ADHF is not likely.    Age Range Result Interpretation  NT-proBNP Concentration (pg/mL:      <50             Positive            >450                   Courtney                  300-450                    Negative             <300    50-75           Positive            >900                  Gray                300-900                  Negative            <300      >75             Positive            >1800                  Gray                300-1800                  Negative            <300    High Sensitivity Troponin T [[id number]]  (Normal) Collected: 04/27/25 1628    Specimen: Blood Updated: 04/27/25 1653     HS Troponin T <6 ng/L     Narrative:      High Sensitive Troponin T Reference Range:  <14.0 ng/L- Negative Female for AMI  <22.0 ng/L- Negative Male for AMI  >=14 - Abnormal Female indicating possible myocardial injury.  >=22 - Abnormal Male indicating possible myocardial injury.   Clinicians would have to utilize clinical acumen, EKG, Troponin, and serial changes to determine if it is an Acute Myocardial Infarction or myocardial injury due to an underlying chronic condition.         CBC Auto Differential [[id number]]  (Normal) Collected: 04/27/25 1628    Specimen: Blood Updated: 04/27/25 1634     WBC 7.07 10*3/mm3      RBC 4.83 10*6/mm3      Hemoglobin 14.4 g/dL      Hematocrit 43.0 %      MCV 89.0 fL      MCH 29.8 pg      MCHC 33.5 g/dL      RDW 13.0 %      RDW-SD 42.3 fl      MPV 9.5 fL      Platelets 269 10*3/mm3      Neutrophil % 65.6 %      Lymphocyte % 24.0 %      Monocyte % 9.2 %      Eosinophil % 0.3 %      Basophil % 0.6 %      Immature Grans % 0.3 %      Neutrophils, Absolute 4.64 10*3/mm3      Lymphocytes, Absolute 1.70 10*3/mm3      Monocytes, Absolute 0.65 10*3/mm3      Eosinophils, Absolute 0.02 10*3/mm3      Basophils, Absolute 0.04 10*3/mm3      Immature Grans, Absolute 0.02 10*3/mm3      nRBC 0.0 /100 WBC     COVID-19, FLU A/B, RSV PCR 1 HR TAT - Swab, Nasopharynx [[id number]]  (Normal) Collected: 04/27/25 1628    Specimen: Swab from Nasopharynx Updated: 04/27/25 1716     COVID19 Not Detected     Influenza A PCR Not Detected     Influenza B PCR Not Detected      RSV, PCR Not Detected    Narrative:      Fact sheet for providers: https://www.fda.gov/media/678953/download    Fact sheet for patients: https://www.fda.gov/media/384552/download    Test performed by PCR.             Imaging:    XR Chest 1 View  Result Date: 4/27/2025  XR CHEST 1 VW Date of Exam: 4/27/2025 3:28 PM EDT Indication: SOA Triage Protocol Comparison: 3/2/2025 Findings: The heart, mediastinum and pulmonary vasculature appear within normal limits. There is mild generalized coarsening of the pulmonary interstitial markings, greater in the lung bases than elsewhere but similar to multiple prior exams. In particular, appearance is very similar to the 12/31/2024 study. There is a subtle suggestion of increased patchy opacity in the lingula, adjacent to left heart border. No lung consolidation effusion or pneumothorax is seen. Bony structures appear to be intact.     Impression: 1. Mild generalized pulmonary interstitial changes, similar to prior exams. Findings may be chronic, but a recurrent bronchitis would appear similar. 2. Suspect mild new patchy disease in the lingula. Please correlate with the patient's symptoms. Electronically Signed: Thony Chacon MD  4/27/2025 3:59 PM EDT  Workstation ID: [id number]        Differential Diagnosis and Discussion:      Dyspnea: Differential diagnosis includes but is not limited to metabolic acidosis, neurological disorders, psychogenic, asthma, pneumothorax, upper airway obstruction, COPD, pneumonia, noncardiogenic pulmonary edema, interstitial lung disease, anemia, congestive heart failure, and pulmonary embolism    PROCEDURES:    Labs were collected in the emergency department and all labs were reviewed and interpreted by me.  X-ray were performed in the emergency department and all X-ray impressions were independently interpreted by me.  An EKG was performed and the EKG was interpreted by me.  An EKG was performed and the EKG was interpreted by supervising  attending.    ECG 12 Lead ED Triage Standing Order; SOA   Preliminary Result   HEART RATE=61  bpm   RR Suxucajz=993  ms   MN Kavvopby=678  ms   P Horizontal Axis=4  deg   P Front Axis=49  deg   QRSD Interval=73  ms   QT Biyyydzs=799  ms   XRrL=807  ms   QRS Axis=-11  deg   T Wave Axis=38  deg   - OTHERWISE NORMAL ECG -   Sinus rhythm   Abnormal R-wave progression, early transition   Date and Time of Study:2025-04-27 15:11:36           Procedures    MDM  Number of Diagnoses or Management Options  Diagnosis management comments: PERC Rule for Pulmonary Embolism - MDCalc  Calculated on Apr 27 2025 6:02 PM  0 criteria -> No need for further workup, as <2% chance of PE. If no criteria are positive and clinician's pre-test probability is <15%, PERC Rule criteria are satisfied.           Amount and/or Complexity of Data Reviewed  Clinical lab tests: reviewed  Tests in the radiology section of CPT®: reviewed  Tests in the medicine section of CPT®: reviewed                     Patient Care Considerations:    SEPSIS was considered but is NOT present in the emergency department as SIRS criteria is not present. CT CHEST: I considered ordering a CT scan of the chest, however PERC score negative      Consultants/Shared Management Plan:    None    Social Determinants of Health:    Patient is independent, reliable, and has access to care.       Disposition and Care Coordination:    Discharged: I considered escalation of care by admitting this patient to the hospital, however patient is not tachypneic, hypoxic or in any respiratory distress.    I have explained the patient´s condition, diagnoses and treatment plan based on the information available to me at this time. I have answered questions and addressed any concerns. The patient has a good  understanding of the patient´s diagnosis, condition, and treatment plan as can be expected at this point. The vital signs have been stable. The patient´s condition is stable and appropriate  for discharge from the emergency department.      The patient will pursue further outpatient evaluation with the primary care physician or other designated or consulting physician as outlined in the discharge instructions. They are agreeable to this plan of care and follow-up instructions have been explained in detail. The patient has received these instructions in written format and has expressed an understanding of the discharge instructions. The patient is aware that any significant change in condition or worsening of symptoms should prompt an immediate return to this or the closest emergency department or call to 911.  I have explained discharge medications and the need for follow up with the patient/caretakers. This was also printed in the discharge instructions. Patient was discharged with the following medications and follow up:      Medication List        New Prescriptions      azithromycin 250 MG tablet  Commonly known as: ZITHROMAX  Take 2 tablets by mouth Daily for 1 day, THEN 1 tablet Daily for 4 days.  Start taking on: April 27, 2025     doxycycline 100 MG capsule  Commonly known as: MONODOX  Take 1 capsule by mouth 2 (Two) Times a Day for 5 days.     methylPREDNISolone 4 MG dose pack  Commonly known as: MEDROL  Take 6 tablets by mouth Daily for 1 day, THEN 5 tablets Daily for 1 day, THEN 4 tablets Daily for 1 day, THEN 3 tablets Daily for 1 day, THEN 2 tablets Daily for 1 day, THEN 1 tablet Daily for 1 day. Take as directed on package instructions.  Start taking on: April 27, 2025            Changed      * albuterol sulfate  (90 Base) MCG/ACT inhaler  Commonly known as: PROVENTIL HFA;VENTOLIN HFA;PROAIR HFA  What changed: Another medication with the same name was added. Make sure you understand how and when to take each.     * albuterol sulfate  (90 Base) MCG/ACT inhaler  Commonly known as: PROVENTIL HFA;VENTOLIN HFA;PROAIR HFA  Inhale 2 puffs Every 4 (Four) Hours As Needed for  Wheezing.  What changed: You were already taking a medication with the same name, and this prescription was added. Make sure you understand how and when to take each.           * This list has 2 medication(s) that are the same as other medications prescribed for you. Read the directions carefully, and ask your doctor or other care provider to review them with you.                   Where to Get Your Medications        These medications were sent to [facility]/pharmacy #[id number] - [place], [place] - [address] - [phone]  - [phone]  [address] [place] [address]      Hours: 24-hours Phone: [phone]   albuterol sulfate  (90 Base) MCG/ACT inhaler  azithromycin 250 MG tablet  doxycycline 100 MG capsule  methylPREDNISolone 4 MG dose pack      Jignesh Doe  [id number] [facility] [place] [id number] [phone]             Final diagnoses:   Pneumonia of left lower lobe due to infectious organism        ED Disposition       ED Disposition   Discharge    Condition   Stable    Comment   --               This medical record created using voice recognition software.             Reynold Álvarez PA-C  04/27/25 1912

## 2025-05-01 LAB
QT INTERVAL: 410 MS
QTC INTERVAL: 414 MS

## 2025-05-19 ENCOUNTER — HOSPITAL ENCOUNTER (EMERGENCY)
Facility: HOSPITAL | Age: 50
Discharge: HOME | End: 2025-05-19
Attending: EMERGENCY MEDICINE | Admitting: EMERGENCY MEDICINE
Payer: MEDICARE

## 2025-05-19 ENCOUNTER — APPOINTMENT (OUTPATIENT)
Dept: GENERAL RADIOLOGY | Facility: HOSPITAL | Age: 50
End: 2025-05-19
Payer: MEDICARE

## 2025-05-19 VITALS
DIASTOLIC BLOOD PRESSURE: 80 MMHG | HEIGHT: 62 IN | HEART RATE: 80 BPM | OXYGEN SATURATION: 92 % | TEMPERATURE: 98 F | WEIGHT: 187.61 LBS | SYSTOLIC BLOOD PRESSURE: 128 MMHG | RESPIRATION RATE: 20 BRPM | BODY MASS INDEX: 34.52 KG/M2

## 2025-05-19 DIAGNOSIS — H66.001 NON-RECURRENT ACUTE SUPPURATIVE OTITIS MEDIA OF RIGHT EAR WITHOUT SPONTANEOUS RUPTURE OF TYMPANIC MEMBRANE: ICD-10-CM

## 2025-05-19 DIAGNOSIS — J44.1 COPD WITH EXACERBATION: Primary | ICD-10-CM

## 2025-05-19 LAB
FLUAV RNA RESP QL NAA+PROBE: NOT DETECTED
FLUBV RNA RESP QL NAA+PROBE: NOT DETECTED
RSV RNA RESP QL NAA+PROBE: NOT DETECTED
SARS-COV-2 RNA RESP QL NAA+PROBE: NOT DETECTED

## 2025-05-19 PROCEDURE — 99283 EMERGENCY DEPT VISIT LOW MDM: CPT

## 2025-05-19 PROCEDURE — 63710000001 PREDNISONE PER 1 MG

## 2025-05-19 PROCEDURE — 94640 AIRWAY INHALATION TREATMENT: CPT

## 2025-05-19 PROCEDURE — 71046 X-RAY EXAM CHEST 2 VIEWS: CPT

## 2025-05-19 PROCEDURE — 94799 UNLISTED PULMONARY SVC/PX: CPT

## 2025-05-19 PROCEDURE — 87637 SARSCOV2&INF A&B&RSV AMP PRB: CPT | Performed by: EMERGENCY MEDICINE

## 2025-05-19 RX ORDER — ALBUTEROL SULFATE 0.83 MG/ML
2.5 SOLUTION RESPIRATORY (INHALATION) EVERY 4 HOURS PRN
Qty: 336 ML | Refills: 0 | Status: SHIPPED | OUTPATIENT
Start: 2025-05-19

## 2025-05-19 RX ORDER — IPRATROPIUM BROMIDE AND ALBUTEROL SULFATE 2.5; .5 MG/3ML; MG/3ML
3 SOLUTION RESPIRATORY (INHALATION) ONCE
Status: COMPLETED | OUTPATIENT
Start: 2025-05-19 | End: 2025-05-19

## 2025-05-19 RX ORDER — PREDNISONE 20 MG/1
60 TABLET ORAL ONCE
Status: COMPLETED | OUTPATIENT
Start: 2025-05-19 | End: 2025-05-19

## 2025-05-19 RX ORDER — ACETAMINOPHEN 500 MG
1000 TABLET ORAL ONCE
Status: COMPLETED | OUTPATIENT
Start: 2025-05-19 | End: 2025-05-19

## 2025-05-19 RX ORDER — PREDNISONE 50 MG/1
50 TABLET ORAL DAILY
Qty: 5 TABLET | Refills: 0 | Status: SHIPPED | OUTPATIENT
Start: 2025-05-19 | End: 2025-05-24

## 2025-05-19 RX ORDER — OXYCODONE HYDROCHLORIDE 5 MG/1
5 TABLET ORAL ONCE
Refills: 0 | Status: COMPLETED | OUTPATIENT
Start: 2025-05-19 | End: 2025-05-19

## 2025-05-19 RX ADMIN — IPRATROPIUM BROMIDE AND ALBUTEROL SULFATE 3 ML: .5; 3 SOLUTION RESPIRATORY (INHALATION) at 21:31

## 2025-05-19 RX ADMIN — ACETAMINOPHEN 1000 MG: 500 TABLET ORAL at 20:36

## 2025-05-19 RX ADMIN — PREDNISONE 60 MG: 20 TABLET ORAL at 21:40

## 2025-05-19 RX ADMIN — OXYCODONE 5 MG: 5 TABLET ORAL at 21:41

## 2025-05-19 NOTE — ED PROVIDER NOTES
Time: 6:34 PM EDT  Date of encounter:  5/19/2025  Independent Historian/Clinical History and Information was obtained by:   Patient    History is limited by: N/A    Chief Complaint: Cough, earache      History of Present Illness:  Patient is a 50 y.o. year old female who presents to the emergency department for evaluation of productive cough, nasal congestion, sinus pressure, right ear pain and headache for the past 3 days.  Patient denies fevers or chills, denies shortness of breath.  Patient reports she recently completed antibiotics 2 weeks ago for pneumonia.      Patient Care Team  Primary Care Provider: Jignesh Doe    Past Medical History:     Allergies   Allergen Reactions    Ct Contrast Anaphylaxis     3/8/25 pre medicated with solumedrol & benadryl, pt reacted, gave 0.3 epi    Tramadol Other (See Comments), Unknown (See Comments) and Headache     MIGRAINES    Ibuprofen Nausea And Vomiting and Other (See Comments)     ULCER    Other reaction(s): Nausea and vomiting   ULCER     Past Medical History:   Diagnosis Date    Anxiety     Cervix cancer     TREATED NO REOCCURANCE    Hernia of abdominal wall     Kidney stone     Thyroid condition     Umbilical hernia      Past Surgical History:   Procedure Laterality Date    KIDNEY STONE SURGERY      LEG SURGERY      vein repair    VENTRAL HERNIA REPAIR N/A 12/19/2024    Procedure: Robotic umbilical hernia repair-repair hernia at umbilicus with small incisions, camera and robotic instruments;  Surgeon: Devin Abdi MD;  Location: [facility] OR [facility];  Service: Robotics - [facility];  Laterality: N/A;     Family History   Problem Relation Age of Onset    Malig Hyperthermia Neg Hx        Home Medications:  Prior to Admission medications    Medication Sig Start Date End Date Taking? Authorizing Provider   albuterol sulfate  (90 Base) MCG/ACT inhaler INHALE 2 PUFFS BY MOUTH EVERY 6 HOURS AS NEEDED FOR SHORTNESS OF BREATH OR WHEEZING 3/10/25   Provider, MD Julissa    albuterol sulfate  (90 Base) MCG/ACT inhaler Inhale 2 puffs Every 4 (Four) Hours As Needed for Wheezing. 4/27/25   Reynold Álvarez PA-C   ALPRAZolam (XANAX) 2 MG tablet Take 1 tablet by mouth 3 (Three) Times a Day As Needed. 10/11/24   Julissa Reed MD   busPIRone (BUSPAR) 10 MG tablet  4/23/25   Julissa Reed MD   cloNIDine (CATAPRES) 0.1 MG tablet Take 1 tablet by mouth Every 12 (Twelve) Hours. 3/10/25   Julissa Reed MD   FLUoxetine (PROzac) 40 MG capsule Take 1 capsule by mouth Daily. 4/2/25   Julissa Reed MD   gabapentin (NEURONTIN) 600 MG tablet Take 1 tablet by mouth 3 (Three) Times a Day.    Julissa Reed MD   HYDROcodone-acetaminophen (NORCO) 7.5-325 MG per tablet Take 1 tablet by mouth Every 6 (Six) Hours. 4/15/25   Julissa Reed MD   hydrOXYzine (ATARAX) 50 MG tablet Take 1 tablet by mouth 2 (Two) Times a Day As Needed for Anxiety. 4/2/25   Julissa Reed MD   levothyroxine (SYNTHROID, LEVOTHROID) 100 MCG tablet Take 1 tablet by mouth Every Morning.    Julissa Reed MD        Social History:   Social History     Tobacco Use    Smoking status: Every Day     Current packs/day: 1.00     Types: Cigarettes    Smokeless tobacco: Never    Tobacco comments:     INST PER ANESTHESIA PROTOCOL   Vaping Use    Vaping status: Never Used   Substance Use Topics    Alcohol use: Not Currently    Drug use: Never         Review of Systems:  Review of Systems   Constitutional:  Negative for fever.   HENT:  Positive for congestion, ear pain and sinus pain. Negative for sore throat.    Respiratory:  Positive for cough. Negative for shortness of breath.    Cardiovascular:  Negative for chest pain.   Gastrointestinal:  Negative for abdominal pain.   Endocrine: Negative for polyuria.   Genitourinary:  Negative for dysuria.   Musculoskeletal:  Negative for neck pain.   Skin:  Negative for rash.   Neurological:  Positive for headaches.   All other systems  "reviewed and are negative.       Physical Exam:  /80   Pulse 80   Temp 98 °F (36.7 °C) (Oral)   Resp 20   Ht 157.5 cm (62\")   Wt 85.1 kg (187 lb 9.8 oz)   SpO2 92%   BMI 34.31 kg/m²     Physical Exam  Vitals and nursing note reviewed.   Constitutional:       Appearance: Normal appearance. She is not ill-appearing or toxic-appearing.   HENT:      Head: Normocephalic.      Right Ear: Tenderness present. There is impacted cerumen (prior to irrigation). Tympanic membrane is erythematous.      Left Ear: Hearing, tympanic membrane, ear canal and external ear normal. Tympanic membrane is not erythematous.      Nose: Nose normal.   Eyes:      Extraocular Movements: Extraocular movements intact.      Conjunctiva/sclera: Conjunctivae normal.      Pupils: Pupils are equal, round, and reactive to light.   Cardiovascular:      Rate and Rhythm: Normal rate.      Pulses: Normal pulses.   Pulmonary:      Effort: Pulmonary effort is normal.      Breath sounds: Wheezing present. No decreased breath sounds, rhonchi or rales.   Abdominal:      General: Abdomen is flat. There is no distension.      Palpations: Abdomen is soft.   Musculoskeletal:      Cervical back: Normal range of motion and neck supple.   Skin:     General: Skin is warm and dry.      Capillary Refill: Capillary refill takes less than 2 seconds.   Neurological:      General: No focal deficit present.      Mental Status: She is alert and oriented to person, place, and time.   Psychiatric:         Mood and Affect: Mood normal.            Medical Decision Making:      Comorbidities that affect care:    Cancer, Smoking, Thyroid Disease    External Notes reviewed:    Previous Clinic Note: Vascular surgery office visit from 3/20/2025 and Previous ED Note: Also reviewed recent ED visit from 4/27/2025, patient was seen for shortness of breath, diagnosed with pneumonia at the left lower lobe, was prescribed azithromycin, doxycycline, albuterol and Medrol " Dosepak      The following orders were placed and all results were independently analyzed by me:  Orders Placed This Encounter   Procedures    Home Nebulizer    COVID PRE-OP / PRE-PROCEDURE SCREENING ORDER (NO ISOLATION) - Swab, Nasopharynx    COVID-19, FLU A/B, RSV PCR 1 HR TAT - Swab, Nasopharynx    XR Chest 2 View    Ear wax removal       Medications Given in the Emergency Department:  Medications   acetaminophen (TYLENOL) tablet 1,000 mg (1,000 mg Oral Given 5/19/25 2036)   ipratropium-albuterol (DUO-NEB) nebulizer solution 3 mL (3 mL Nebulization Given 5/19/25 2131)   predniSONE (DELTASONE) tablet 60 mg (60 mg Oral Given 5/19/25 2140)   oxyCODONE (ROXICODONE) immediate release tablet 5 mg (5 mg Oral Given 5/19/25 2141)        ED Course:         Labs:    Lab Results (last 24 hours)       Procedure Component Value Units Date/Time    COVID PRE-OP / PRE-PROCEDURE SCREENING ORDER (NO ISOLATION) - Swab, Nasopharynx [[id number]]  (Normal) Collected: 05/19/25 1849    Specimen: Swab from Nasopharynx Updated: 05/19/25 1937    Narrative:      The following orders were created for panel order COVID PRE-OP / PRE-PROCEDURE SCREENING ORDER (NO ISOLATION) - Swab, Nasopharynx.  Procedure                               Abnormality         Status                     ---------                               -----------         ------                     COVID-19, FLU A/B, RSV P...[[id number]]  Normal              Final result                 Please view results for these tests on the individual orders.    COVID-19, FLU A/B, RSV PCR 1 HR TAT - Swab, Nasopharynx [[id number]]  (Normal) Collected: 05/19/25 1849    Specimen: Swab from Nasopharynx Updated: 05/19/25 1937     COVID19 Not Detected     Influenza A PCR Not Detected     Influenza B PCR Not Detected     RSV, PCR Not Detected    Narrative:      Fact sheet for providers: https://www.fda.gov/media/870903/download    Fact sheet for patients:  https://www.fda.gov/media/271272/download    Test performed by PCR.             Imaging:    XR Chest 2 View  Result Date: 5/19/2025  XR CHEST 2 VW Date of Exam: 5/19/2025 8:17 PM EDT Indication: cough Comparison: 4/27/2025 Findings: Lines: None Lungs: The lungs are clear. Pleura: No pleural effusion or pneumothorax. Cardiomediastinum: The cardiomediastinal silhouette is normal Soft Tissues: Unremarkable. Bones: No acute osseous abnormality.     Impression: No acute cardiopulmonary abnormality. Electronically Signed: Presley Marleen,   5/19/2025 9:07 PM EDT  Workstation ID: [id number]        Differential Diagnosis and Discussion:    Cough: Differential diagnosis includes but is not limited to pneumonia, acute bronchitis, upper respiratory infection, ACE inhibitor use, allergic reaction, epiglottitis, seasonal allergies, chemical irritants, exercise-induced asthma, viral syndrome.  Ear Pain: Differential diagnosis includes but is not limited to this externa, otitis media, foreign body, bullous myringitis, furuncles, herpes zoster, mastoiditis, trauma, and tumors    PROCEDURES:    Labs were collected in the emergency department and all labs were reviewed and interpreted by me.  X-ray were performed in the emergency department and all X-ray impressions were independently interpreted by me.    No orders to display       Procedures    MDM     Amount and/or Complexity of Data Reviewed  Clinical lab tests: reviewed       The patient presents with shortness of breath consistent with  COPD exacerbation. The patient reports significant relief of their symptoms with ED treatment. The patient has no respiratory distress or hypoxia. This includes the absence of cervical, clavicular, and abdominal retractions; tachypnea and an oxygen saturation of less than 92% on room air.  The patient is able to speak in full sentences and is ambulatory without hypoxia on exertion. The patient's condition is stable and appropriate for  discharge. The patient was advised to return for fever, respiratory distress, intractable vomiting, weakness, worsening shortness of breath, chest pain, or altered mental status. The patient will pursue further outpatient evaluation with the primary care physician. The patient has expressed a clear and thorough understanding and agreed to follow-up as instructed.  Physical exam is also consistent with acute otitis media of the right ear.  Will start patient on Augmentin.  Will also treat for COPD exacerbation with prednisone and albuterol nebulizer.  Discussed with patient she will need to follow-up with the primary care provider for ongoing management.  Also discussed with patient strict ED return instructions.              Patient Care Considerations:    SEPSIS was considered but is NOT present in the emergency department as SIRS criteria is not present.      Consultants/Shared Management Plan:    SHARED VISIT: I have discussed the case with my supervising physician, Dr. Russo who states he agrees with plan of care, recommends treating for COPD exacerbation. The substantive portion of the medical decision was made by the attesting physician who made or approve the management plan and will take responsibility for the patient.  Clinical findings were discussed and ultimate disposition was made in consult with supervising physician.    Social Determinants of Health:    Patient is independent, reliable, and has access to care.       Disposition and Care Coordination:    Discharged: I considered escalation of care by admitting this patient to the hospital, however patient is not septic, SpO2 is 92%.    I have explained the patient´s condition, diagnoses and treatment plan based on the information available to me at this time. I have answered questions and addressed any concerns. The patient has a good  understanding of the patient´s diagnosis, condition, and treatment plan as can be expected at this point. The vital  signs have been stable. The patient´s condition is stable and appropriate for discharge from the emergency department.      The patient will pursue further outpatient evaluation with the primary care physician or other designated or consulting physician as outlined in the discharge instructions. They are agreeable to this plan of care and follow-up instructions have been explained in detail. The patient has received these instructions in written format and has expressed an understanding of the discharge instructions. The patient is aware that any significant change in condition or worsening of symptoms should prompt an immediate return to this or the closest emergency department or call to 911.  I have explained discharge medications and the need for follow up with the patient/caretakers. This was also printed in the discharge instructions. Patient was discharged with the following medications and follow up:      Medication List        New Prescriptions      amoxicillin-clavulanate 875-125 MG per tablet  Commonly known as: AUGMENTIN  Take 1 tablet by mouth 2 (Two) Times a Day for 7 days.     predniSONE 50 MG tablet  Commonly known as: DELTASONE  Take 1 tablet by mouth Daily for 5 days.            Changed      * albuterol sulfate  (90 Base) MCG/ACT inhaler  Commonly known as: PROVENTIL HFA;VENTOLIN HFA;PROAIR HFA  What changed: Another medication with the same name was added. Make sure you understand how and when to take each.     * albuterol sulfate  (90 Base) MCG/ACT inhaler  Commonly known as: PROVENTIL HFA;VENTOLIN HFA;PROAIR HFA  Inhale 2 puffs Every 4 (Four) Hours As Needed for Wheezing.  What changed: Another medication with the same name was added. Make sure you understand how and when to take each.     * albuterol (2.5 MG/3ML) 0.083% nebulizer solution  Commonly known as: PROVENTIL  Take 2.5 mg by nebulization Every 4 (Four) Hours As Needed for Wheezing or Shortness of Air.  What changed: You  were already taking a medication with the same name, and this prescription was added. Make sure you understand how and when to take each.           * This list has 3 medication(s) that are the same as other medications prescribed for you. Read the directions carefully, and ask your doctor or other care provider to review them with you.                   Where to Get Your Medications        These medications were sent to [facility]/pharmacy #[id number] - [place], [place] - [address] - [phone] [facility] [phone]   [address] [place] [address]      Hours: 24-hours Phone: [phone]   albuterol (2.5 MG/3ML) 0.083% nebulizer solution  amoxicillin-clavulanate 875-125 MG per tablet  predniSONE 50 MG tablet      Jignesh Doe  [address] [facility] [place] [address]  [phone]             Final diagnoses:   COPD with exacerbation   Non-recurrent acute suppurative otitis media of right ear without spontaneous rupture of tympanic membrane        ED Disposition       ED Disposition   Discharge    Condition   Stable    Comment   --               This medical record created using voice recognition software.             Yeny Ramos, APRN  05/19/25 [id number]

## 2025-05-20 NOTE — ED PROVIDER NOTES
"SHARED VISIT ATTESTATION:    This visit was performed by myself and an APC.  I personally approved the management plan/medical decision making and take responsibility for the patient management.      SHARED VISIT NOTE:    Patient is 50 y.o. year old female that presents to the ED for evaluation of a cough and earache.  The patient has had nasal congestion sinus pressure cough and earache for the last 3 days..       ED Course:    /80   Pulse 80   Temp 98 °F (36.7 °C) (Oral)   Resp 20   Ht 157.5 cm (62\")   Wt 85.1 kg (187 lb 9.8 oz)   SpO2 92%   BMI 34.31 kg/m²       The following orders were placed and all results were independently analyzed by me:  Orders Placed This Encounter   Procedures    Home Nebulizer    COVID PRE-OP / PRE-PROCEDURE SCREENING ORDER (NO ISOLATION) - Swab, Nasopharynx    COVID-19, FLU A/B, RSV PCR 1 HR TAT - Swab, Nasopharynx    XR Chest 2 View    Ear wax removal       Medications Given in the Emergency Department:  Medications   acetaminophen (TYLENOL) tablet 1,000 mg (1,000 mg Oral Given 5/19/25 2036)   ipratropium-albuterol (DUO-NEB) nebulizer solution 3 mL (3 mL Nebulization Given 5/19/25 2131)   predniSONE (DELTASONE) tablet 60 mg (60 mg Oral Given 5/19/25 2140)   oxyCODONE (ROXICODONE) immediate release tablet 5 mg (5 mg Oral Given 5/19/25 2141)        ED Course:         Labs:    Lab Results (last 24 hours)       Procedure Component Value Units Date/Time    COVID PRE-OP / PRE-PROCEDURE SCREENING ORDER (NO ISOLATION) - Swab, Nasopharynx [[id number]]  (Normal) Collected: 05/19/25 1849    Specimen: Swab from Nasopharynx Updated: 05/19/25 1937    Narrative:      The following orders were created for panel order COVID PRE-OP / PRE-PROCEDURE SCREENING ORDER (NO ISOLATION) - Swab, Nasopharynx.  Procedure                               Abnormality         Status                     ---------                               -----------         ------                     COVID-19, FLU A/B, " RSV P...[[id number]]  Normal              Final result                 Please view results for these tests on the individual orders.    COVID-19, FLU A/B, RSV PCR 1 HR TAT - Swab, Nasopharynx [[id number]]  (Normal) Collected: 05/19/25 1849    Specimen: Swab from Nasopharynx Updated: 05/19/25 1937     COVID19 Not Detected     Influenza A PCR Not Detected     Influenza B PCR Not Detected     RSV, PCR Not Detected    Narrative:      Fact sheet for providers: https://www.fda.gov/media/472096/download    Fact sheet for patients: https://www.fda.gov/media/042896/download    Test performed by PCR.             Imaging:    XR Chest 2 View  Result Date: 5/19/2025  XR CHEST 2 VW Date of Exam: 5/19/2025 8:17 PM EDT Indication: cough Comparison: 4/27/2025 Findings: Lines: None Lungs: The lungs are clear. Pleura: No pleural effusion or pneumothorax. Cardiomediastinum: The cardiomediastinal silhouette is normal Soft Tissues: Unremarkable. Bones: No acute osseous abnormality.     Impression: No acute cardiopulmonary abnormality. Electronically Signed: Presley Hawley DO  5/19/2025 9:07 PM EDT  Workstation ID: [id number]      MDM:    Procedures                         Donny Russo DO  22:26 EDT  05/19/25         Donny Russo DO  05/19/25 2226

## 2025-05-20 NOTE — DISCHARGE INSTRUCTIONS
Continue taking the antibiotics until the entire course is finished.  Return to the emergency department for worsening symptoms such as worsening shortness of breath or fevers.  Follow-up with your primary care provider.  I would also advise that you stop smoking as this may be the biggest contributor to your breathing problems.

## 2025-06-10 ENCOUNTER — APPOINTMENT (OUTPATIENT)
Dept: GENERAL RADIOLOGY | Facility: HOSPITAL | Age: 50
End: 2025-06-10
Payer: MEDICARE

## 2025-06-10 ENCOUNTER — HOSPITAL ENCOUNTER (EMERGENCY)
Facility: HOSPITAL | Age: 50
Discharge: LEFT BEFORE DISCHARGE | End: 2025-06-10
Attending: EMERGENCY MEDICINE | Admitting: EMERGENCY MEDICINE
Payer: MEDICARE

## 2025-06-10 ENCOUNTER — APPOINTMENT (OUTPATIENT)
Dept: CT IMAGING | Facility: HOSPITAL | Age: 50
End: 2025-06-10
Payer: MEDICARE

## 2025-06-10 VITALS
WEIGHT: 185 LBS | SYSTOLIC BLOOD PRESSURE: 114 MMHG | RESPIRATION RATE: 20 BRPM | OXYGEN SATURATION: 93 % | TEMPERATURE: 97.8 F | HEIGHT: 62 IN | BODY MASS INDEX: 34.04 KG/M2 | DIASTOLIC BLOOD PRESSURE: 64 MMHG | HEART RATE: 87 BPM

## 2025-06-10 DIAGNOSIS — J18.9 PNEUMONIA DUE TO INFECTIOUS ORGANISM, UNSPECIFIED LATERALITY, UNSPECIFIED PART OF LUNG: ICD-10-CM

## 2025-06-10 DIAGNOSIS — R51.9 NONINTRACTABLE HEADACHE, UNSPECIFIED CHRONICITY PATTERN, UNSPECIFIED HEADACHE TYPE: Primary | ICD-10-CM

## 2025-06-10 LAB
ALBUMIN SERPL-MCNC: 3.9 G/DL (ref 3.5–5.2)
ALBUMIN/GLOB SERPL: 1.2 G/DL
ALP SERPL-CCNC: 78 U/L (ref 39–117)
ALT SERPL W P-5'-P-CCNC: 30 U/L (ref 1–33)
ANION GAP SERPL CALCULATED.3IONS-SCNC: 13.9 MMOL/L (ref 5–15)
AST SERPL-CCNC: 32 U/L (ref 1–32)
BASOPHILS # BLD AUTO: 0.06 10*3/MM3 (ref 0–0.2)
BASOPHILS NFR BLD AUTO: 1 % (ref 0–1.5)
BILIRUB SERPL-MCNC: 0.2 MG/DL (ref 0–1.2)
BUN SERPL-MCNC: 17.4 MG/DL (ref 6–20)
BUN/CREAT SERPL: 16.1 (ref 7–25)
CALCIUM SPEC-SCNC: 9.1 MG/DL (ref 8.6–10.5)
CHLORIDE SERPL-SCNC: 102 MMOL/L (ref 98–107)
CO2 SERPL-SCNC: 21.1 MMOL/L (ref 22–29)
CREAT SERPL-MCNC: 1.08 MG/DL (ref 0.57–1)
DEPRECATED RDW RBC AUTO: 41.3 FL (ref 37–54)
EGFRCR SERPLBLD CKD-EPI 2021: 63.5 ML/MIN/1.73
EOSINOPHIL # BLD AUTO: 0.21 10*3/MM3 (ref 0–0.4)
EOSINOPHIL NFR BLD AUTO: 3.5 % (ref 0.3–6.2)
ERYTHROCYTE [DISTWIDTH] IN BLOOD BY AUTOMATED COUNT: 12.9 % (ref 12.3–15.4)
GLOBULIN UR ELPH-MCNC: 3.2 GM/DL
GLUCOSE SERPL-MCNC: 128 MG/DL (ref 65–99)
HCT VFR BLD AUTO: 39.9 % (ref 34–46.6)
HGB BLD-MCNC: 13.6 G/DL (ref 12–15.9)
HOLD SPECIMEN: NORMAL
HOLD SPECIMEN: NORMAL
IMM GRANULOCYTES # BLD AUTO: 0.01 10*3/MM3 (ref 0–0.05)
IMM GRANULOCYTES NFR BLD AUTO: 0.2 % (ref 0–0.5)
LYMPHOCYTES # BLD AUTO: 2.26 10*3/MM3 (ref 0.7–3.1)
LYMPHOCYTES NFR BLD AUTO: 37.5 % (ref 19.6–45.3)
MAGNESIUM SERPL-MCNC: 1.8 MG/DL (ref 1.6–2.6)
MCH RBC QN AUTO: 29.9 PG (ref 26.6–33)
MCHC RBC AUTO-ENTMCNC: 34.1 G/DL (ref 31.5–35.7)
MCV RBC AUTO: 87.7 FL (ref 79–97)
MONOCYTES # BLD AUTO: 0.67 10*3/MM3 (ref 0.1–0.9)
MONOCYTES NFR BLD AUTO: 11.1 % (ref 5–12)
NEUTROPHILS NFR BLD AUTO: 2.81 10*3/MM3 (ref 1.7–7)
NEUTROPHILS NFR BLD AUTO: 46.7 % (ref 42.7–76)
NRBC BLD AUTO-RTO: 0 /100 WBC (ref 0–0.2)
PLATELET # BLD AUTO: 267 10*3/MM3 (ref 140–450)
PMV BLD AUTO: 9.4 FL (ref 6–12)
POTASSIUM SERPL-SCNC: 3.9 MMOL/L (ref 3.5–5.2)
PROT SERPL-MCNC: 7.1 G/DL (ref 6–8.5)
RBC # BLD AUTO: 4.55 10*6/MM3 (ref 3.77–5.28)
SODIUM SERPL-SCNC: 137 MMOL/L (ref 136–145)
T4 FREE SERPL-MCNC: 1.32 NG/DL (ref 0.92–1.68)
TROPONIN T SERPL HS-MCNC: <6 NG/L
TSH SERPL DL<=0.05 MIU/L-ACNC: 0.39 UIU/ML (ref 0.27–4.2)
WBC NRBC COR # BLD AUTO: 6.02 10*3/MM3 (ref 3.4–10.8)
WHOLE BLOOD HOLD COAG: NORMAL
WHOLE BLOOD HOLD SPECIMEN: NORMAL

## 2025-06-10 PROCEDURE — 84443 ASSAY THYROID STIM HORMONE: CPT | Performed by: REGISTERED NURSE

## 2025-06-10 PROCEDURE — 99284 EMERGENCY DEPT VISIT MOD MDM: CPT

## 2025-06-10 PROCEDURE — 84439 ASSAY OF FREE THYROXINE: CPT | Performed by: REGISTERED NURSE

## 2025-06-10 PROCEDURE — 84484 ASSAY OF TROPONIN QUANT: CPT | Performed by: REGISTERED NURSE

## 2025-06-10 PROCEDURE — 83735 ASSAY OF MAGNESIUM: CPT | Performed by: REGISTERED NURSE

## 2025-06-10 PROCEDURE — 85025 COMPLETE CBC W/AUTO DIFF WBC: CPT | Performed by: REGISTERED NURSE

## 2025-06-10 PROCEDURE — 71045 X-RAY EXAM CHEST 1 VIEW: CPT

## 2025-06-10 PROCEDURE — 93005 ELECTROCARDIOGRAM TRACING: CPT | Performed by: REGISTERED NURSE

## 2025-06-10 PROCEDURE — 36415 COLL VENOUS BLD VENIPUNCTURE: CPT | Performed by: REGISTERED NURSE

## 2025-06-10 PROCEDURE — 80053 COMPREHEN METABOLIC PANEL: CPT | Performed by: REGISTERED NURSE

## 2025-06-10 RX ORDER — AZITHROMYCIN 250 MG/1
TABLET, FILM COATED ORAL
Qty: 6 TABLET | Refills: 0 | Status: SHIPPED | OUTPATIENT
Start: 2025-06-10

## 2025-06-10 RX ORDER — ACETAMINOPHEN 325 MG/1
975 TABLET ORAL ONCE
Status: COMPLETED | OUTPATIENT
Start: 2025-06-10 | End: 2025-06-10

## 2025-06-10 RX ORDER — SODIUM CHLORIDE 0.9 % (FLUSH) 0.9 %
10 SYRINGE (ML) INJECTION AS NEEDED
Status: DISCONTINUED | OUTPATIENT
Start: 2025-06-10 | End: 2025-06-11 | Stop reason: HOSPADM

## 2025-06-10 RX ORDER — ACETAMINOPHEN 10 MG/ML
1000 INJECTION, SOLUTION INTRAVENOUS ONCE
Status: DISCONTINUED | OUTPATIENT
Start: 2025-06-10 | End: 2025-06-10

## 2025-06-10 RX ADMIN — ACETAMINOPHEN 975 MG: 325 TABLET ORAL at 23:33

## 2025-06-10 NOTE — LETTER
AGAINST MEDICAL ADVICE: REFUSAL OF MEDICAL   SCREENING/EXAMINATION/TREATMENT/TRANSFER FORM    Patient: Velma Andres YOB: 1975    Date: 06/10/25  Time: 23:45 EDT  __________________________________________________________________________________  EMERGENCY MEDICAL SCREENING EXAMINATION OFFERED:                I hereby acknowledge that I have been informed of my right to receive an emergency medical screening examination to determine whether I have an emergency medical condition as well as subsequent medical treatment. I understand [facility] is obligated and willing to provide such medical screening examination and medical treatment.   __________________________________________________________________________________  TRANSFER TO ANOTHER FACILITY FOR FURTHER MEDICAL TREATMENT OFFERED:               I hereby acknowledge that I have been informed of the medical benefits of being transferred to another facility for further medical examination and treatment because [facility] does not have the capacity and/or capability of treating my emergency medical condition.   __________________________________________________________________________________  CONTINUED CARE AT                                                       HOSPITAL OFFERED:      I hereby acknowledge that I have been informed of the medical benefits of receiving further medical treatment at                             Hospital in accordance with my physician’s orders.     POTENTIAL BENEFITS OF SERVICE OFFERED: POTENTIAL RISKS OF REFUSING SERVICES:       ____ Improved/stabilized condition   ____ Prevent deterioration   ____ Improved condition of fetus   ____ Preserve life  Other: ______________________________   ___________________________________   ___________________________________   ___________________________________  ____ Deterioration of condition   ____ Deterioration of fetal condition    ____ Permanent impairment   ____ Death  Other: ______________________________   ___________________________________   ___________________________________   ___________________________________                                                                                                                                                              I understand the benefits of receiving the care, treatment and/or transfer, which has been offered to me. I understand the risks and complications of not receiving the care, treatment and/or transfer, which has been offered to me. I understand that my refusal to receive such care, treatment and/or transfer may place my health and life at risk. I also understand, if I am pregnant, that the health and life of my unborn child may be at risk. I have had the opportunity to ask questions, all of which have been answered to my satisfaction.     Notwithstanding the recommendation by the Hospital and/or physicians, I hereby refuse to have the care, screening, treatment and/or transfer, which has been offered to me. I understand that refusing the offered services is AGAINST MEDICAL ADVICE. I hereby release [facility], its physicians, employees and agents, and any other persons participating in my care from any responsibility whatsoever for adverse results which may occur as a result of my refusal to have the care treatment and/or transfer.     ____________________________________ ______________________________________  Patient/legally authorized individual signature  Witness     ____________________________________               If signed by other than patient, indicate relationship

## 2025-06-11 LAB
QT INTERVAL: 387 MS
QTC INTERVAL: 481 MS

## 2025-06-11 NOTE — ED PROVIDER NOTES
Time: 9:24 PM EDT  Date of encounter:  6/10/2025  Independent Historian/Clinical History and Information was obtained by:   Patient    History is limited by: N/A    Chief Complaint   Patient presents with    Headache    Chest Pain    Shortness of Breath    Tremors         History of Present Illness:  Patient is a 50 y.o. year old female who presents to the emergency department companied by family for evaluation of headache, blurry vision, chest pain, shortness of breath and tremors that started primarily today.  Patient reports that her doctor upped her levothyroxine from 100 mcg to 150 mcg 1 week ago.  That is when the patient's headache started.  The patient cut back to 125 mcg for a few days to kind of have a more gradual increase.  When today she took 150 she started having all the other symptoms.  She called the pharmacist and was advised to come to the ED to be further evaluated.  Patient is concerned about thyroid storm.  RUI Veliz    Patient Care Team  Primary Care Provider: Jignesh Doe    Past Medical History:     Allergies   Allergen Reactions    Ct Contrast Anaphylaxis     3/8/25 pre medicated with solumedrol & benadryl, pt reacted, gave 0.3 epi    Tramadol Other (See Comments), Unknown (See Comments) and Headache     MIGRAINES    Ibuprofen Nausea And Vomiting and Other (See Comments)     ULCER    Other reaction(s): Nausea and vomiting   ULCER     Past Medical History:   Diagnosis Date    Anxiety     Cervix cancer     TREATED NO REOCCURANCE    Hernia of abdominal wall     Kidney stone     Thyroid condition     Umbilical hernia      Past Surgical History:   Procedure Laterality Date    KIDNEY STONE SURGERY      LEG SURGERY      vein repair    VENTRAL HERNIA REPAIR N/A 12/19/2024    Procedure: Robotic umbilical hernia repair-repair hernia at umbilicus with small incisions, camera and robotic instruments;  Surgeon: Devin Abdi MD;  [place] [facility] [place] [facility] [place]  Service: Robotics - [facility];   Laterality: N/A;     Family History   Problem Relation Age of Onset    Malig Hyperthermia Neg Hx        Home Medications:  Prior to Admission medications    Medication Sig Start Date End Date Taking? Authorizing Provider   albuterol (PROVENTIL) (2.5 MG/3ML) 0.083% nebulizer solution Take 2.5 mg by nebulization Every 4 (Four) Hours As Needed for Wheezing or Shortness of Air. 5/19/25   Yeny Ramos APRN   albuterol sulfate  (90 Base) MCG/ACT inhaler INHALE 2 PUFFS BY MOUTH EVERY 6 HOURS AS NEEDED FOR SHORTNESS OF BREATH OR WHEEZING 3/10/25   Julissa Reed MD   albuterol sulfate  (90 Base) MCG/ACT inhaler Inhale 2 puffs Every 4 (Four) Hours As Needed for Wheezing. 4/27/25   Reynold Álvarez PA-C   ALPRAZolam (XANAX) 2 MG tablet Take 1 tablet by mouth 3 (Three) Times a Day As Needed. 10/11/24   Julissa Reed MD   busPIRone (BUSPAR) 10 MG tablet  4/23/25   Julissa Reed MD   cloNIDine (CATAPRES) 0.1 MG tablet Take 1 tablet by mouth Every 12 (Twelve) Hours. 3/10/25   Julisas Reed MD   FLUoxetine (PROzac) 40 MG capsule Take 1 capsule by mouth Daily. 4/2/25   Julissa Reed MD   gabapentin (NEURONTIN) 600 MG tablet Take 1 tablet by mouth 3 (Three) Times a Day.    Julissa Reed MD   HYDROcodone-acetaminophen (NORCO) 7.5-325 MG per tablet Take 1 tablet by mouth Every 6 (Six) Hours. 4/15/25   Julissa Reed MD   hydrOXYzine (ATARAX) 50 MG tablet Take 1 tablet by mouth 2 (Two) Times a Day As Needed for Anxiety. 4/2/25   Julissa Reed MD   levothyroxine (SYNTHROID, LEVOTHROID) 100 MCG tablet Take 1 tablet by mouth Every Morning.    Julissa Reed MD        Social History:   Social History     Tobacco Use    Smoking status: Every Day     Current packs/day: 1.00     Types: Cigarettes    Smokeless tobacco: Never    Tobacco comments:     INST PER ANESTHESIA PROTOCOL   Vaping Use    Vaping status: Never Used   Substance Use Topics    Alcohol  "use: Not Currently    Drug use: Never         Review of Systems:  Review of Systems   Constitutional:  Negative for chills and fever.   HENT:  Negative for congestion, ear pain and sore throat.    Eyes:  Positive for visual disturbance. Negative for pain.   Respiratory:  Positive for shortness of breath. Negative for cough and chest tightness.    Cardiovascular:  Positive for chest pain.   Gastrointestinal:  Negative for abdominal pain, diarrhea, nausea and vomiting.   Genitourinary:  Negative for flank pain and hematuria.   Musculoskeletal:  Negative for joint swelling.   Skin:  Negative for pallor.   Neurological:  Positive for tremors and headaches. Negative for seizures.   All other systems reviewed and are negative.       Physical Exam:  /64   Pulse 87   Temp 97.8 °F (36.6 °C) (Oral)   Resp 20   Ht 157.5 cm (62\")   Wt 83.9 kg (185 lb)   SpO2 93%   BMI 33.84 kg/m²         Physical Exam  Constitutional:       Appearance: Normal appearance.   HENT:      Head: Normocephalic and atraumatic.      Nose: Nose normal.      Mouth/Throat:      Mouth: Mucous membranes are moist.   Eyes:      Conjunctiva/sclera: Conjunctivae normal.   Cardiovascular:      Rate and Rhythm: Normal rate and regular rhythm.      Pulses: Normal pulses.      Heart sounds: Normal heart sounds.   Pulmonary:      Effort: Pulmonary effort is normal.      Breath sounds: Normal breath sounds.   Abdominal:      General: There is no distension.      Palpations: Abdomen is soft.      Tenderness: There is no abdominal tenderness.   Musculoskeletal:         General: Normal range of motion.      Cervical back: Normal range of motion and neck supple.   Skin:     General: Skin is warm and dry.      Capillary Refill: Capillary refill takes less than 2 seconds.   Neurological:      General: No focal deficit present.      Mental Status: She is alert and oriented to person, place, and time. Mental status is at baseline.   Psychiatric:         Mood and " Affect: Mood normal.         Behavior: Behavior normal.                            Medical Decision Making:      Comorbidities that affect care:    anxiety, Thyroid Disease    External Notes reviewed:    Previous Clinic Note: Patient seen by general surgery on 1/17/2025 for umbilical hernia without obstruction and without gangrene.      The following orders were placed and all results were independently analyzed by me:  Orders Placed This Encounter   Procedures    XR Chest 1 View    CT Head Without Contrast    [place] Draw    Comprehensive Metabolic Panel    High Sensitivity Troponin T    Magnesium    TSH    T4, Free    CBC Auto Differential    Continuous Pulse Oximetry    Vital Signs    Oxygen Therapy- Nasal Cannula; Titrate 1-6 LPM Per SpO2; 90 - 95%    ECG 12 Lead Chest Pain    CBC & Differential    Green Top (Gel)    Lavender Top    Gold Top - SST    Light Blue Top       Medications Given in the Emergency Department:  Medications   sodium chloride 0.9 % flush 10 mL (has no administration in time range)   sodium chloride 0.9 % bolus 1,000 mL (has no administration in time range)   acetaminophen (TYLENOL) tablet 975 mg (975 mg Oral Given 6/10/25 2333)        ED Course:    The patient was initially evaluated in the triage area where orders were placed. The patient was later dispositioned by Nilo Osborn MD.      The patient was advised to stay for completion of workup which includes but is not limited to communication of labs and radiological results, reassessment and plan. The patient was advised that leaving prior to disposition by a provider could result in critical findings that are not communicated to the patient.     ED Course as of 06/11/25 0000   Tue Tone 10, 2025   2212 ECG 12 Lead Chest Pain  Sinus rhythm with heart rate of 93.  No acute ST elevation.  Normal OK and QTc.  EKG interpreted by me [LD]      ED Course User Index  [LD] Nilo Osborn MD       Labs:    Lab Results (last 24 hours)        Procedure Component Value Units Date/Time    CBC & Differential [[id number]]  (Normal) Collected: 06/10/25 2141    Specimen: Blood Updated: 06/10/25 2150    Narrative:      The following orders were created for panel order CBC & Differential.  Procedure                               Abnormality         Status                     ---------                               -----------         ------                     CBC Auto Differential[[id number]]        Normal              Final result                 Please view results for these tests on the individual orders.    Comprehensive Metabolic Panel [[id number]]  (Abnormal) Collected: 06/10/25 2141    Specimen: Blood Updated: 06/10/25 2212     Glucose 128 mg/dL      BUN 17.4 mg/dL      Creatinine 1.08 mg/dL      Sodium 137 mmol/L      Potassium 3.9 mmol/L      Chloride 102 mmol/L      CO2 21.1 mmol/L      Calcium 9.1 mg/dL      Total Protein 7.1 g/dL      Albumin 3.9 g/dL      ALT (SGPT) 30 U/L      AST (SGOT) 32 U/L      Alkaline Phosphatase 78 U/L      Total Bilirubin 0.2 mg/dL      Globulin 3.2 gm/dL      A/G Ratio 1.2 g/dL      BUN/Creatinine Ratio 16.1     Anion Gap 13.9 mmol/L      eGFR 63.5 mL/min/1.73     Narrative:      GFR Categories in Chronic Kidney Disease (CKD)              GFR Category          GFR (mL/min/1.73)    Interpretation  G1                    90 or greater        Normal or high (1)  G2                    60-89                Mild decrease (1)  G3a                   45-59                Mild to moderate decrease  G3b                   30-44                Moderate to severe decrease  G4                    15-29                Severe decrease  G5                    14 or less           Kidney failure    (1)In the absence of evidence of kidney disease, neither GFR category G1 or G2 fulfill the criteria for CKD.    eGFR calculation 2021 CKD-EPI creatinine equation, which does not include race as a factor    High Sensitivity Troponin T [[id number]]   (Normal) Collected: 06/10/25 2141    Specimen: Blood Updated: 06/10/25 2254     HS Troponin T <6 ng/L     Narrative:      High Sensitive Troponin T Reference Range:  <14.0 ng/L- Negative Female for AMI  <22.0 ng/L- Negative Male for AMI  >=14 - Abnormal Female indicating possible myocardial injury.  >=22 - Abnormal Male indicating possible myocardial injury.   Clinicians would have to utilize clinical acumen, EKG, Troponin, and serial changes to determine if it is an Acute Myocardial Infarction or myocardial injury due to an underlying chronic condition.         Magnesium [[id number]]  (Normal) Collected: 06/10/25 2141    Specimen: Blood Updated: 06/10/25 2212     Magnesium 1.8 mg/dL     TSH [[id number]]  (Normal) Collected: 06/10/25 2141    Specimen: Blood Updated: 06/10/25 2254     TSH 0.391 uIU/mL     T4, Free [[id number]]  (Normal) Collected: 06/10/25 2141    Specimen: Blood Updated: 06/10/25 2254     Free T4 1.32 ng/dL     CBC Auto Differential [[id number]]  (Normal) Collected: 06/10/25 2141    Specimen: Blood Updated: 06/10/25 2150     WBC 6.02 10*3/mm3      RBC 4.55 10*6/mm3      Hemoglobin 13.6 g/dL      Hematocrit 39.9 %      MCV 87.7 fL      MCH 29.9 pg      MCHC 34.1 g/dL      RDW 12.9 %      RDW-SD 41.3 fl      MPV 9.4 fL      Platelets 267 10*3/mm3      Neutrophil % 46.7 %      Lymphocyte % 37.5 %      Monocyte % 11.1 %      Eosinophil % 3.5 %      Basophil % 1.0 %      Immature Grans % 0.2 %      Neutrophils, Absolute 2.81 10*3/mm3      Lymphocytes, Absolute 2.26 10*3/mm3      Monocytes, Absolute 0.67 10*3/mm3      Eosinophils, Absolute 0.21 10*3/mm3      Basophils, Absolute 0.06 10*3/mm3      Immature Grans, Absolute 0.01 10*3/mm3      nRBC 0.0 /100 WBC              Imaging:    XR Chest 1 View  Result Date: 6/10/2025  XR CHEST 1 VW Date of Exam: 6/10/2025 10:00 PM EDT Indication: Chest pain protocol Comparison: 5/19/2025 Findings: Cardiac and mediastinal contours are normal. Pulmonary vascularity is  normal. No pneumothorax is seen. The right lung is clear. There is infiltrate at the left lung base concerning for pneumonia.     Left basilar infiltrate concerning for pneumonia. Electronically Signed: Luis Fernando Messer MD  6/10/2025 10:13 PM EDT  Workstation ID: [id number]        Differential Diagnosis and Discussion:      Chest Pain:  Based on the patient's signs and symptoms, I considered aortic dissection, myocardial infaction, pulmonary embolism, cardiac tamponade, pericarditis, pneumothorax, musculoskeletal chest pain and other differential diagnosis as an etiology of the patient's chest pain.   Headache: Differential diagnosis includes but is not limited to migraine, cluster headache, hypertension, tumor, subarachnoid bleeding, pseudotumor cerebri, temporal arteritis, infections, tension headache, and TMJ syndrome.    PROCEDURES:    Labs were collected in the emergency department and all labs were reviewed and interpreted by me.  X-ray were performed in the emergency department and all X-ray impressions were independently interpreted by me.  An EKG was performed and the EKG was interpreted by me.    ECG 12 Lead Chest Pain   Preliminary Result   HEART RATE=93  bpm   RR Afsvsyhl=281  ms   AR Iksqhcvy=521  ms   P Horizontal Axis=15  deg   P Front Axis=56  deg   QRSD Interval=79  ms   QT Lrmsswxw=357  ms   UWvV=210  ms   QRS Axis=-23  deg   T Wave Axis=50  deg   - BORDERLINE ECG -   Sinus rhythm   Borderline left axis deviation   Consider anterior infarct   Date and Time of Study:2025-06-10 21:21:30           Procedures    MDM  Number of Diagnoses or Management Options  Diagnosis management comments: Patient presented to the emergency department with multiple complaints.  She reports headache.  She denies history of headaches.  Patient is given Tylenol.  I ordered CT scan which patient refused.  Labs did not show any significant findings.   Explained why I believe that we should get a CT scan.  Patient and family  decided that they do not want to stay and decided to leave AGAINST MEDICAL ADVICE.  After patient left we did get the results for x-ray that was concerning for pneumonia.  Will send prescription for antibiotic to the pharmacy.       Amount and/or Complexity of Data Reviewed  Clinical lab tests: reviewed  Tests in the radiology section of CPT®: reviewed  Review and summarize past medical records: yes    Risk of Complications, Morbidity, and/or Mortality  Presenting problems: moderate  Management options: moderate                     Patient Care Considerations:    SEPSIS was considered but is NOT present in the emergency department as SIRS criteria is not present.      Consultants/Shared Management Plan:    None    Social Determinants of Health:    Patient is independent, reliable, and has access to care.       Disposition and Care Coordination:    AMA: I was informed that patient wishes to leave AGAINST MEDICAL ADVICE.    The patient has decision-making capacity. The patient understands the medical recommendations for further testing and evaluation.    The risks of leaving including death, permanent neurological disability, cardiovascular collapse, shock, or worsening of their condition. The patient understands these risks and was able to verbalize them back to me.    Although I disagree with the patient's decision to leave, I do not feel that it is appropriate to hold the patient against their will.  Several health care personal have tried to convince the patient to stay.  The patient is still electing to leave.     The patient was advised and encouraged to return at any time to complete evaluation.  The patient will be discharged per patient request.  The patient is to return immediately for worsening of their condition or other concerns.  The patient was given discharge instructions.  Expeditious follow up was strongly encouraged.            Final diagnoses:   Nonintractable headache, unspecified chronicity  pattern, unspecified headache type   Pneumonia due to infectious organism, unspecified laterality, unspecified part of lung        ED Disposition       ED Disposition   AMA    Condition   --    Comment   --               This medical record created using voice recognition software.             Nilo Osborn MD  06/11/25 0000

## 2025-06-11 NOTE — ED NOTES
Pt refusing blood draw and EKG. States she does not want her CT scan. MD aware. Pt requesting to leave AMA. AMA form signed.

## 2025-07-25 ENCOUNTER — HOSPITAL ENCOUNTER (EMERGENCY)
Facility: HOSPITAL | Age: 50
Discharge: HOME | End: 2025-07-25
Attending: EMERGENCY MEDICINE
Payer: MEDICARE

## 2025-07-25 VITALS
HEART RATE: 103 BPM | OXYGEN SATURATION: 97 % | RESPIRATION RATE: 19 BRPM | BODY MASS INDEX: 33.92 KG/M2 | WEIGHT: 184.3 LBS | DIASTOLIC BLOOD PRESSURE: 86 MMHG | SYSTOLIC BLOOD PRESSURE: 137 MMHG | HEIGHT: 62 IN | TEMPERATURE: 97.7 F

## 2025-07-25 DIAGNOSIS — Z53.21 ELOPED FROM EMERGENCY DEPARTMENT: Primary | ICD-10-CM

## 2025-07-25 LAB
ALBUMIN SERPL-MCNC: 4.1 G/DL (ref 3.5–5.2)
ALBUMIN/GLOB SERPL: 1.2 G/DL
ALP SERPL-CCNC: 84 U/L (ref 39–117)
ALT SERPL W P-5'-P-CCNC: 25 U/L (ref 1–33)
ANION GAP SERPL CALCULATED.3IONS-SCNC: 13.6 MMOL/L (ref 5–15)
AST SERPL-CCNC: 25 U/L (ref 1–32)
BASOPHILS # BLD AUTO: 0.08 10*3/MM3 (ref 0–0.2)
BASOPHILS NFR BLD AUTO: 1.1 % (ref 0–1.5)
BILIRUB SERPL-MCNC: 0.2 MG/DL (ref 0–1.2)
BUN SERPL-MCNC: 12.6 MG/DL (ref 6–20)
BUN/CREAT SERPL: 11.8 (ref 7–25)
CALCIUM SPEC-SCNC: 9.5 MG/DL (ref 8.6–10.5)
CHLORIDE SERPL-SCNC: 103 MMOL/L (ref 98–107)
CO2 SERPL-SCNC: 21.4 MMOL/L (ref 22–29)
CREAT SERPL-MCNC: 1.07 MG/DL (ref 0.57–1)
DEPRECATED RDW RBC AUTO: 41.1 FL (ref 37–54)
EGFRCR SERPLBLD CKD-EPI 2021: 64.2 ML/MIN/1.73
EOSINOPHIL # BLD AUTO: 0.3 10*3/MM3 (ref 0–0.4)
EOSINOPHIL NFR BLD AUTO: 4 % (ref 0.3–6.2)
ERYTHROCYTE [DISTWIDTH] IN BLOOD BY AUTOMATED COUNT: 12.9 % (ref 12.3–15.4)
GLOBULIN UR ELPH-MCNC: 3.4 GM/DL
GLUCOSE SERPL-MCNC: 108 MG/DL (ref 65–99)
HCT VFR BLD AUTO: 43.5 % (ref 34–46.6)
HGB BLD-MCNC: 14.7 G/DL (ref 12–15.9)
HOLD SPECIMEN: NORMAL
HOLD SPECIMEN: NORMAL
IMM GRANULOCYTES # BLD AUTO: 0.03 10*3/MM3 (ref 0–0.05)
IMM GRANULOCYTES NFR BLD AUTO: 0.4 % (ref 0–0.5)
INR PPP: 1.01 (ref 0.86–1.15)
LYMPHOCYTES # BLD AUTO: 2.76 10*3/MM3 (ref 0.7–3.1)
LYMPHOCYTES NFR BLD AUTO: 36.7 % (ref 19.6–45.3)
MCH RBC QN AUTO: 29.7 PG (ref 26.6–33)
MCHC RBC AUTO-ENTMCNC: 33.8 G/DL (ref 31.5–35.7)
MCV RBC AUTO: 87.9 FL (ref 79–97)
MONOCYTES # BLD AUTO: 0.64 10*3/MM3 (ref 0.1–0.9)
MONOCYTES NFR BLD AUTO: 8.5 % (ref 5–12)
NEUTROPHILS NFR BLD AUTO: 3.72 10*3/MM3 (ref 1.7–7)
NEUTROPHILS NFR BLD AUTO: 49.3 % (ref 42.7–76)
NRBC BLD AUTO-RTO: 0 /100 WBC (ref 0–0.2)
PLATELET # BLD AUTO: 276 10*3/MM3 (ref 140–450)
PMV BLD AUTO: 9.3 FL (ref 6–12)
POTASSIUM SERPL-SCNC: 4.2 MMOL/L (ref 3.5–5.2)
PROT SERPL-MCNC: 7.5 G/DL (ref 6–8.5)
PROTHROMBIN TIME: 13.7 SECONDS (ref 11.8–14.9)
RBC # BLD AUTO: 4.95 10*6/MM3 (ref 3.77–5.28)
SODIUM SERPL-SCNC: 138 MMOL/L (ref 136–145)
WBC NRBC COR # BLD AUTO: 7.53 10*3/MM3 (ref 3.4–10.8)
WHOLE BLOOD HOLD COAG: NORMAL
WHOLE BLOOD HOLD SPECIMEN: NORMAL

## 2025-07-25 PROCEDURE — 80053 COMPREHEN METABOLIC PANEL: CPT | Performed by: REGISTERED NURSE

## 2025-07-25 PROCEDURE — 36415 COLL VENOUS BLD VENIPUNCTURE: CPT

## 2025-07-25 PROCEDURE — 85610 PROTHROMBIN TIME: CPT | Performed by: REGISTERED NURSE

## 2025-07-25 PROCEDURE — 85025 COMPLETE CBC W/AUTO DIFF WBC: CPT | Performed by: REGISTERED NURSE

## 2025-07-25 PROCEDURE — 99211 OFF/OP EST MAY X REQ PHY/QHP: CPT | Performed by: EMERGENCY MEDICINE

## 2025-07-25 PROCEDURE — 99283 EMERGENCY DEPT VISIT LOW MDM: CPT

## 2025-07-25 NOTE — ED PROVIDER NOTES
"Time: 5:48 PM EDT  Date of encounter:  7/25/2025  Independent Historian/Clinical History and Information was obtained by:   Patient    History is limited by: N/A    Chief Complaint   Patient presents with    Leg Swelling         History of Present Illness:  Patient is a 50 y.o. year old female who presents to the emergency department for evaluation of left leg swelling and foot feeling \"weird\".  Patient reports that she has a previous stent placed in that leg due to vascular issues.  She saw her vascular doctor last week and they wanted to do CT scan, however she is allergic to contrast dye.  RUI Veliz    Patient Care Team  Primary Care Provider: Jignesh Doe    Past Medical History:     Allergies   Allergen Reactions    Ct Contrast Anaphylaxis     3/8/25 pre medicated with solumedrol & benadryl, pt reacted, gave 0.3 epi    Tramadol Other (See Comments), Unknown (See Comments) and Headache     MIGRAINES    Ibuprofen Nausea And Vomiting and Other (See Comments)     ULCER    Other reaction(s): Nausea and vomiting   ULCER     Past Medical History:   Diagnosis Date    Anxiety     Cervix cancer     TREATED NO REOCCURANCE    Hernia of abdominal wall     Kidney stone     Thyroid condition     Umbilical hernia      Past Surgical History:   Procedure Laterality Date    KIDNEY STONE SURGERY      LEG SURGERY      vein repair    VENTRAL HERNIA REPAIR N/A 12/19/2024    Procedure: Robotic umbilical hernia repair-repair hernia at umbilicus with small incisions, camera and robotic instruments;  Surgeon: Devin Abdi MD;  [place] [facility] [place] [facility] [place] [facility];  Laterality: N/A;     Family History   Problem Relation Age of Onset    Malig Hyperthermia Neg Hx        Home Medications:  Prior to Admission medications    Medication Sig Start Date End Date Taking? Authorizing Provider   albuterol (PROVENTIL) (2.5 MG/3ML) 0.083% nebulizer solution Take 2.5 mg by nebulization Every 4 (Four) Hours As Needed for " Wheezing or Shortness of Air. 5/19/25   Yeny Ramos APRN   albuterol sulfate  (90 Base) MCG/ACT inhaler INHALE 2 PUFFS BY MOUTH EVERY 6 HOURS AS NEEDED FOR SHORTNESS OF BREATH OR WHEEZING 3/10/25   Julissa Reed MD   albuterol sulfate  (90 Base) MCG/ACT inhaler Inhale 2 puffs Every 4 (Four) Hours As Needed for Wheezing. 4/27/25   Reynold Álvarez PA-C   ALPRAZolam (XANAX) 2 MG tablet Take 1 tablet by mouth 3 (Three) Times a Day As Needed. 10/11/24   Julissa Reed MD   azithromycin (Zithromax Z-Kendall) 250 MG tablet Take 2 tablets by mouth on day 1, then 1 tablet daily on days 2-5 6/10/25   Nilo Osborn MD   busPIRone (BUSPAR) 10 MG tablet  4/23/25   Julissa Reed MD   cloNIDine (CATAPRES) 0.1 MG tablet Take 1 tablet by mouth Every 12 (Twelve) Hours. 3/10/25   Julissa Reed MD   FLUoxetine (PROzac) 40 MG capsule Take 1 capsule by mouth Daily. 4/2/25   Julissa Reed MD   gabapentin (NEURONTIN) 600 MG tablet Take 1 tablet by mouth 3 (Three) Times a Day.    Julissa Reed MD   HYDROcodone-acetaminophen (NORCO) 7.5-325 MG per tablet Take 1 tablet by mouth Every 6 (Six) Hours. 4/15/25   Julissa Reed MD   hydrOXYzine (ATARAX) 50 MG tablet Take 1 tablet by mouth 2 (Two) Times a Day As Needed for Anxiety. 4/2/25   Julissa Reed MD   levothyroxine (SYNTHROID, LEVOTHROID) 100 MCG tablet Take 1 tablet by mouth Every Morning.    Julissa Reed MD        Social History:   Social History     Tobacco Use    Smoking status: Every Day     Current packs/day: 1.00     Types: Cigarettes    Smokeless tobacco: Never    Tobacco comments:     INST PER ANESTHESIA PROTOCOL   Vaping Use    Vaping status: Never Used   Substance Use Topics    Alcohol use: Not Currently    Drug use: Never         Review of Systems:  Review of Systems   Cardiovascular:  Positive for leg swelling.        Physical Exam:  /86 (BP Location: Right arm, Patient  "Position: Sitting)   Pulse 103   Temp 97.7 °F (36.5 °C) (Oral)   Resp 19   Ht 157.5 cm (62\")   Wt 83.6 kg (184 lb 4.9 oz)   SpO2 97%   BMI 33.71 kg/m²         Physical Exam  HENT:      Head: Normocephalic.      Mouth/Throat:      Mouth: Mucous membranes are moist.   Eyes:      Pupils: Pupils are equal, round, and reactive to light.   Pulmonary:      Effort: Pulmonary effort is normal.   Abdominal:      General: There is no distension.   Musculoskeletal:      Cervical back: Neck supple.   Skin:     General: Skin is warm and dry.   Neurological:      General: No focal deficit present.      Mental Status: She is alert and oriented to person, place, and time.   Psychiatric:         Mood and Affect: Mood normal.         Behavior: Behavior normal.                            Medical Decision Making:      Comorbidities that affect care:        External Notes reviewed:          The following orders were placed and all results were independently analyzed by me:  Orders Placed This Encounter   Procedures    Comprehensive Metabolic Panel    Protime-INR    [place] Draw    CBC Auto Differential    CBC & Differential    Green Top (Gel)    Lavender Top    Gold Top - SST    Light Blue Top       Medications Given in the Emergency Department:  Medications - No data to display     ED Course:    The patient was initially evaluated in the triage area where orders were placed. The patient was later dispositioned by RUI Arenas.      The patient was advised to stay for completion of workup which includes but is not limited to communication of labs and radiological results, reassessment and plan. The patient was advised that leaving prior to disposition by a provider could result in critical findings that are not communicated to the patient.          Labs:    Lab Results (last 24 hours)       Procedure Component Value Units Date/Time    CBC & Differential [[id number]]  (Normal) Collected: 07/25/25 1800    Specimen: Blood " Updated: 07/25/25 1804    Narrative:      The following orders were created for panel order CBC & Differential.  Procedure                               Abnormality         Status                     ---------                               -----------         ------                     CBC Auto Differential[[id number]]        Normal              Final result                 Please view results for these tests on the individual orders.    Comprehensive Metabolic Panel [[id number]]  (Abnormal) Collected: 07/25/25 1800    Specimen: Blood Updated: 07/25/25 1828     Glucose 108 mg/dL      BUN 12.6 mg/dL      Creatinine 1.07 mg/dL      Sodium 138 mmol/L      Potassium 4.2 mmol/L      Comment: Slight hemolysis detected by analyzer. Result may be falsely elevated.        Chloride 103 mmol/L      CO2 21.4 mmol/L      Calcium 9.5 mg/dL      Total Protein 7.5 g/dL      Albumin 4.1 g/dL      ALT (SGPT) 25 U/L      AST (SGOT) 25 U/L      Alkaline Phosphatase 84 U/L      Total Bilirubin 0.2 mg/dL      Globulin 3.4 gm/dL      A/G Ratio 1.2 g/dL      BUN/Creatinine Ratio 11.8     Anion Gap 13.6 mmol/L      eGFR 64.2 mL/min/1.73     Narrative:      GFR Categories in Chronic Kidney Disease (CKD)              GFR Category          GFR (mL/min/1.73)    Interpretation  G1                    90 or greater        Normal or high (1)  G2                    60-89                Mild decrease (1)  G3a                   45-59                Mild to moderate decrease  G3b                   30-44                Moderate to severe decrease  G4                    15-29                Severe decrease  G5                    14 or less           Kidney failure    (1)In the absence of evidence of kidney disease, neither GFR category G1 or G2 fulfill the criteria for CKD.    eGFR calculation 2021 CKD-EPI creatinine equation, which does not include race as a factor    Protime-INR [[id number]]  (Normal) Collected: 07/25/25 1800    Specimen: Blood Updated:  07/25/25 1816     Protime 13.7 Seconds      INR 1.01    Narrative:      Suggested Therapeutic Ranges For Oral Anticoagulant Therapy:  Level of Therapy                      INR Target Range  Standard Dose                            2.0-3.0  High Dose                                2.5-3.5  Patients not receiving anticoagulant  Therapy Normal Range                     0.86-1.15    CBC Auto Differential [[id number]]  (Normal) Collected: 07/25/25 1800    Specimen: Blood Updated: 07/25/25 1804     WBC 7.53 10*3/mm3      RBC 4.95 10*6/mm3      Hemoglobin 14.7 g/dL      Hematocrit 43.5 %      MCV 87.9 fL      MCH 29.7 pg      MCHC 33.8 g/dL      RDW 12.9 %      RDW-SD 41.1 fl      MPV 9.3 fL      Platelets 276 10*3/mm3      Neutrophil % 49.3 %      Lymphocyte % 36.7 %      Monocyte % 8.5 %      Eosinophil % 4.0 %      Basophil % 1.1 %      Immature Grans % 0.4 %      Neutrophils, Absolute 3.72 10*3/mm3      Lymphocytes, Absolute 2.76 10*3/mm3      Monocytes, Absolute 0.64 10*3/mm3      Eosinophils, Absolute 0.30 10*3/mm3      Basophils, Absolute 0.08 10*3/mm3      Immature Grans, Absolute 0.03 10*3/mm3      nRBC 0.0 /100 WBC              Imaging:    No Radiology Exams Resulted Within Past 24 Hours      Differential Diagnosis and Discussion:      Edema: Based on the patient's history, signs, and symptoms, the differential diagnosis includes but is not limited to heart failure, kidney disease, liver disease, venous insufficiency, lymphedema, pregnancy, medications, allergic reactions.    PROCEDURES:        No orders to display        Procedures    MDM                   Patient Care Considerations:          Consultants/Shared Management Plan:        Social Determinants of Health:          Disposition and Care Coordination:            Final diagnoses:   Eloped from emergency department        ED Disposition       ED Disposition   Eloped    Condition   --    Comment   --               This medical record created using voice  recognition software.             Danni Yusuf, RUI  07/25/25 8614

## (undated) DEVICE — SYR LL TP 10ML STRL

## (undated) DEVICE — DAVINCI-LF: Brand: MEDLINE INDUSTRIES, INC.

## (undated) DEVICE — NON-WOVEN ADHESIVE WOUND DRESSING: Brand: PRIMAPORE ADHESIVE WOUND DRSG 7.2*5CM

## (undated) DEVICE — INTENDED FOR TISSUE SEPARATION, AND OTHER PROCEDURES THAT REQUIRE A SHARP SURGICAL BLADE TO PUNCTURE OR CUT.: Brand: BARD-PARKER ® CARBON RIB-BACK BLADES

## (undated) DEVICE — SEAL

## (undated) DEVICE — SUT MERSILENE POLYSTR CT1 BR 0 75CM GRN

## (undated) DEVICE — TIP COVER ACCESSORY

## (undated) DEVICE — ENDOPATH XCEL WITH OPTIVIEW TECHNOLOGY BLADELESS TROCARS WITH STABILITY SLEEVES: Brand: ENDOPATH XCEL OPTIVIEW

## (undated) DEVICE — VESSEL SEALER EXTEND: Brand: ENDOWRIST

## (undated) DEVICE — SHEET,DRAPE,70X85,STERILE: Brand: MEDLINE

## (undated) DEVICE — ARM DRAPE

## (undated) DEVICE — SLV SCD KN/LEN ADJ EXPRSS BLENDED MD 1P/U

## (undated) DEVICE — LAPAROVUE VISIBILITY SYSTEM LAPAROSCOPIC SOLUTIONS: Brand: LAPAROVUE

## (undated) DEVICE — GLV SURG SENSICARE PI ORTHO SZ8 LF STRL

## (undated) DEVICE — LAPAROSCOPIC SCISSORS: Brand: EPIX LAPAROSCOPIC SCISSORS

## (undated) DEVICE — STERILE POLYISOPRENE POWDER-FREE SURGICAL GLOVES: Brand: PROTEXIS

## (undated) DEVICE — PENCL ES MEGADINE EZ/CLEAN BUTN W/HOLSTR 10FT

## (undated) DEVICE — SOL IRR NACL 0.9PCT BO 1000ML

## (undated) DEVICE — 1LYRTR 16FR10ML100%SIL UMS SNP: Brand: MEDLINE INDUSTRIES, INC.

## (undated) DEVICE — STRIP CLS WND SUTURESTRIP/PLS 0.5X4IN TP1103

## (undated) DEVICE — ANTIBACTERIAL UNDYED BRAIDED (POLYGLACTIN 910), SYNTHETIC ABSORBABLE SUTURE: Brand: COATED VICRYL

## (undated) DEVICE — SYS CLOSE PORTII CARTR/THOMASN XL